# Patient Record
Sex: MALE | Race: WHITE | NOT HISPANIC OR LATINO | Employment: OTHER | URBAN - METROPOLITAN AREA
[De-identification: names, ages, dates, MRNs, and addresses within clinical notes are randomized per-mention and may not be internally consistent; named-entity substitution may affect disease eponyms.]

---

## 2017-04-17 ENCOUNTER — OFFICE VISIT (OUTPATIENT)
Dept: INTERNAL MEDICINE | Age: 72
End: 2017-04-17

## 2017-04-17 VITALS
OXYGEN SATURATION: 96 % | DIASTOLIC BLOOD PRESSURE: 82 MMHG | TEMPERATURE: 98 F | SYSTOLIC BLOOD PRESSURE: 144 MMHG | BODY MASS INDEX: 28.28 KG/M2 | WEIGHT: 176 LBS | HEART RATE: 82 BPM | HEIGHT: 66 IN

## 2017-04-17 DIAGNOSIS — I10 ESSENTIAL HYPERTENSION: Primary | Chronic | ICD-10-CM

## 2017-04-17 DIAGNOSIS — E78.2 MIXED HYPERLIPIDEMIA: Chronic | ICD-10-CM

## 2017-04-17 DIAGNOSIS — E66.3 OVERWEIGHT (BMI 25.0-29.9): Chronic | ICD-10-CM

## 2017-04-17 PROCEDURE — 99204 OFFICE O/P NEW MOD 45 MIN: CPT | Performed by: INTERNAL MEDICINE

## 2017-04-17 RX ORDER — MELATONIN
1000 DAILY
COMMUNITY
End: 2017-09-25

## 2017-04-17 RX ORDER — NAPROXEN 500 MG/1
500 TABLET ORAL 2 TIMES DAILY WITH MEALS
COMMUNITY
Start: 2017-03-21 | End: 2017-09-25 | Stop reason: SINTOL

## 2017-04-17 RX ORDER — SIMVASTATIN 20 MG
20 TABLET ORAL NIGHTLY
COMMUNITY
End: 2017-04-17 | Stop reason: SDUPTHER

## 2017-04-17 RX ORDER — BENAZEPRIL HYDROCHLORIDE 20 MG/1
TABLET ORAL
COMMUNITY
Start: 2017-03-30 | End: 2017-04-17 | Stop reason: SDUPTHER

## 2017-04-17 RX ORDER — BENAZEPRIL HYDROCHLORIDE 20 MG/1
20 TABLET ORAL DAILY
COMMUNITY
Start: 2017-04-17 | End: 2017-06-20 | Stop reason: SDUPTHER

## 2017-04-17 RX ORDER — SODIUM PHOSPHATE,MONO-DIBASIC 19G-7G/118
2 ENEMA (ML) RECTAL DAILY
COMMUNITY
End: 2018-03-26

## 2017-04-17 RX ORDER — SIMVASTATIN 20 MG
20 TABLET ORAL NIGHTLY
COMMUNITY
Start: 2017-04-17 | End: 2017-11-07 | Stop reason: SDUPTHER

## 2017-04-18 NOTE — PROGRESS NOTES
"Nicolás DONALDSON Wayne / 71 y.o. / male  04/17/2017    VITALS:    /82  Pulse 82  Temp 98 °F (36.7 °C)  Ht 66\" (167.6 cm)  Wt 176 lb (79.8 kg)  SpO2 96%  BMI 28.41 kg/m2  BP Readings from Last 3 Encounters:   04/17/17 144/82     Wt Readings from Last 3 Encounters:   04/17/17 176 lb (79.8 kg)      Body mass index is 28.41 kg/(m^2).    CC: Main reason(s) for today's visit: Establish Care (Former Dr Justen anderson)      HPI:    Patient is a 71 y.o. male who is here to establish care.   H/o chronic essential hypertension, does not check bp at home. On benazepril 20 mg w/ significant side effects. Denies h/o heart dz, kidney problems, h/o stroke/TIA.    H/o chronic hyperlipidemia on simvastatin 20 mg but taking only 2 days of the week. Denies myalgia or other problems with the medication.     >30 pack years of smoking quit in 1997. Mother had lung cancer and father had heart attack history.    H/o mild obesity but currently overweight. Denies h/o hyperglycemia or prediabetes.    ____________________________________________________________________    ASSESSMENT & PLAN:    Problem List Items Addressed This Visit        High    Hypertension - Primary (Chronic)    Current Assessment & Plan     BP little high today. Monitor home BP. Continue benazepril 20 mg qd for now. Low Na diet.          Relevant Medications    benazepril (LOTENSIN) 20 MG tablet    Mixed hyperlipidemia (Chronic)    Current Assessment & Plan     Continue simvastatin but instructed to take daily. Will check labs on follow-up.   Maintain low fat/cholesterol diet.           Relevant Medications    simvastatin (ZOCOR) 20 MG tablet       Low    Overweight (BMI 25.0-29.9) (Chronic)    Current Assessment & Plan     Maintain a low sugar/starch/carbohydrate diet and exercise regularly. Weight loss advised.                  Summary/Discussion:     ·        Return in about 6 months (around 10/17/2017) for F/U chronic medical problems.    Future " Appointments  Date Time Provider Department Center   10/17/2017 11:20 AM Eusebio Seaman MD [facility] None       ____________________________________________________________________    REVIEW OF SYSTEMS    Review of Systems   Constitutional: Negative.    HENT: Negative.    Eyes: Negative.    Respiratory: Negative.  Apnea: snores.    Cardiovascular: Negative.    Gastrointestinal: Negative.    Endocrine: Negative.    Genitourinary: Negative.  Negative for difficulty urinating, frequency and hematuria.        H/o elevated psa   Musculoskeletal: Negative.    Skin: Negative.    Allergic/Immunologic: Negative.    Neurological: Negative.    Hematological: Negative.    Psychiatric/Behavioral: Negative.      Other: As noted per HPI      PHYSICAL EXAMINATION    Physical Exam   Constitutional: He appears well-nourished. No distress.   Overweight     HENT:   Head: Normocephalic.   Right Ear: External ear normal.   Left Ear: External ear normal.   Mouth/Throat: Oropharynx is clear and moist.   Eyes: Conjunctivae are normal. Pupils are equal, round, and reactive to light. No scleral icterus.   Neck: Neck supple. No tracheal deviation present. No thyromegaly present.   Cardiovascular: Normal rate, regular rhythm, normal heart sounds and intact distal pulses.  Exam reveals no gallop and no friction rub.    No murmur heard.  Pulmonary/Chest: Effort normal and breath sounds normal.   Abdominal: Soft. Bowel sounds are normal. He exhibits no distension and no mass. There is no tenderness. No hernia.   Musculoskeletal: He exhibits no edema or deformity.   Lymphadenopathy:     He has no cervical adenopathy.        Right: No supraclavicular adenopathy present.        Left: No supraclavicular adenopathy present.   Neurological: He is alert. He has normal reflexes.   Skin: Skin is warm and intact. No rash noted. No cyanosis or erythema. No pallor. Nails show no clubbing.   Psychiatric: He has a normal mood and affect. His behavior is normal.  Judgment and thought content normal. Cognition and memory are normal.       REVIEWED DATA:    Labs:   No results found for: NA, K, AST, ALT, BUN, CREATININE, EGFRIFNONA, EGFRIFAFRI    No results found for: GLU, HGBA1C, MICROALBUR    No results found for: LDL, HDL, TRIG, CHOLHDLRATIO    No results found for: TSH, FREET4     No results found for: WBC, HGB, PLT      Imaging:        Medical Tests:        Summary of old records / correspondence / consultant report:        Request outside records:        ______________________________________________________________________    ALLERGIES:    Review of patient's allergies indicates no known allergies.    MEDICATIONS:       Current Outpatient Prescriptions   Medication Sig Dispense Refill   • benazepril (LOTENSIN) 20 MG tablet Take 1 tablet by mouth Daily.     • cholecalciferol (VITAMIN D3) 1000 UNITS tablet Take 1,000 Units by mouth Daily.     • glucosamine-chondroitin 500-400 MG capsule capsule Take  by mouth 2 (Two) Times a Day With Meals.     • Multiple Vitamins-Minerals (MULTIVITAMIN ADULT PO) Take  by mouth.     • naproxen (NAPROSYN) 500 MG tablet      • Selenium 200 MCG capsule Take  by mouth Daily.     • simvastatin (ZOCOR) 20 MG tablet Take 1 tablet by mouth Every Night.       No current facility-administered medications for this visit.        PFSH: The following portions of the patient's history were reviewed and updated as appropriate: Allergies / Current Medications / Past Medical History / Surgical History / Social History / Family History    PROBLEM LIST:    Patient Active Problem List   Diagnosis   • Hypertension   • Mixed hyperlipidemia   • Overweight (BMI 25.0-29.9)       PAST MEDICAL HX:    Past Medical History:   Diagnosis Date   • Broken legs    • Elevated PSA    • Hypertension        PAST SURGICAL HX:    Past Surgical History:   Procedure Laterality Date   • CRANIOTOMY      age of 18y ; blood clot    • KNEE ARTHROPLASTY     • TONSILLECTOMY     •  VASECTOMY         SOCIAL HX:    Social History     Social History   • Marital status:      Spouse name: Surya*   • Number of children: 2   • Years of education: N/A     Occupational History   • 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)      Social History Main Topics   • Smoking status: Former Smoker     Years: .     Types: Cigarettes     Quit date:    • Smokeless tobacco: Never Used      Comment: quit 20 years ago   • Alcohol use Yes      Comment: 1 drink per week   • Drug use: None   • Sexual activity: Yes     Partners: Female     Other Topics Concern   • None     Social History Narrative   • None       FAMILY HX:    Family History   Problem Relation Age of Onset   • Lung cancer Mother      smoker;  age 64   • Heart attack Father 64   • Urolithiasis Other      all siblings         **Deborah Disclaimer:   Much of this encounter note is an electronic transcription/translation of spoken language to printed text. The electronic translation of spoken language may permit erroneous, or at times, nonsensical words or phrases to be inadvertently transcribed. Although I have reviewed the note for such errors, some may still exist.

## 2017-04-18 NOTE — ASSESSMENT & PLAN NOTE
Continue simvastatin but instructed to take daily. Will check labs on follow-up.   Maintain low fat/cholesterol diet.

## 2017-05-17 ENCOUNTER — TELEPHONE (OUTPATIENT)
Dept: INTERNAL MEDICINE | Age: 72
End: 2017-05-17

## 2017-05-18 ENCOUNTER — TELEPHONE (OUTPATIENT)
Dept: INTERNAL MEDICINE | Age: 72
End: 2017-05-18

## 2017-05-18 DIAGNOSIS — I10 ESSENTIAL HYPERTENSION: Primary | Chronic | ICD-10-CM

## 2017-05-18 RX ORDER — HYDROCHLOROTHIAZIDE 12.5 MG/1
12.5 TABLET ORAL DAILY
Qty: 30 TABLET | Refills: 1 | Status: SHIPPED | OUTPATIENT
Start: 2017-05-18 | End: 2017-07-19 | Stop reason: SDUPTHER

## 2017-06-19 ENCOUNTER — OFFICE VISIT (OUTPATIENT)
Dept: INTERNAL MEDICINE | Age: 72
End: 2017-06-19

## 2017-06-19 VITALS
HEART RATE: 67 BPM | HEIGHT: 66 IN | BODY MASS INDEX: 28.28 KG/M2 | TEMPERATURE: 97.8 F | WEIGHT: 176 LBS | SYSTOLIC BLOOD PRESSURE: 122 MMHG | DIASTOLIC BLOOD PRESSURE: 70 MMHG | OXYGEN SATURATION: 97 %

## 2017-06-19 DIAGNOSIS — M15.9 PRIMARY OSTEOARTHRITIS INVOLVING MULTIPLE JOINTS: Chronic | ICD-10-CM

## 2017-06-19 DIAGNOSIS — I10 ESSENTIAL HYPERTENSION: Primary | Chronic | ICD-10-CM

## 2017-06-19 PROBLEM — M15.0 PRIMARY OSTEOARTHRITIS INVOLVING MULTIPLE JOINTS: Chronic | Status: ACTIVE | Noted: 2017-06-19

## 2017-06-19 PROCEDURE — 99213 OFFICE O/P EST LOW 20 MIN: CPT | Performed by: INTERNAL MEDICINE

## 2017-06-19 RX ORDER — AMLODIPINE BESYLATE 5 MG/1
5 TABLET ORAL DAILY
Qty: 30 TABLET | Refills: 3 | Status: SHIPPED | OUTPATIENT
Start: 2017-06-19 | End: 2017-11-17 | Stop reason: SDUPTHER

## 2017-06-19 NOTE — ASSESSMENT & PLAN NOTE
Add amlodipine 5 mg qd. Continue benazepril 20 mg and hctz 12.5 mg.   (on high dose naproxen for OA from ortho).

## 2017-06-19 NOTE — PROGRESS NOTES
"Nicolás DONALDSON Wayne / 71 y.o. / male  06/19/2017    ASSESSMENT & PLAN:    Problem List Items Addressed This Visit     Hypertension - Primary (Chronic)    Current Assessment & Plan     Add amlodipine 5 mg qd. Continue benazepril 20 mg and hctz 12.5 mg.   (on high dose naproxen for OA from ortho).         Relevant Medications    benazepril (LOTENSIN) 20 MG tablet    hydrochlorothiazide (HYDRODIURIL) 12.5 MG tablet    amLODIPine (NORVASC) 5 MG tablet    Primary osteoarthritis involving multiple joints (Chronic)    Current Assessment & Plan     D/w ortho re: trying tylenol in place of naproxen if possible (for BP)             No orders of the defined types were placed in this encounter.      Summary/Discussion:     ·       Return in about 3 months (around 9/19/2017) for Hypertension.    Future Appointments  Date Time Provider Department Center   10/17/2017 11:20 AM Eusebio Seaman MD [facility] None       ____________________________________________________________________    VITALS    /70  Pulse 67  Temp 97.8 °F (36.6 °C)  Ht 66\" (167.6 cm)  Wt 176 lb (79.8 kg)  SpO2 97%  BMI 28.41 kg/m2  BP Readings from Last 3 Encounters:   06/19/17 122/70   04/17/17 144/82     Wt Readings from Last 3 Encounters:   06/19/17 176 lb (79.8 kg)   04/17/17 176 lb (79.8 kg)      Body mass index is 28.41 kg/(m^2).    CC:  Main reason(s) for today's visit: Hypertension      HPI:     Hypertension: BP remains high in AM's. On benazepril 20 mg and hctz 12.5 mg qd. Denies ha or chest pain.  On naproxen 500 bid by ortho for OA of knees/hips.     Patient Care Team:  Eusebio Seaman MD as PCP - General (Internal Medicine)  ____________________________________________________________________    REVIEW OF SYSTEMS    Review of Systems  As noted per HPI  Constitutional neg   Resp neg   CV neg    Other: As noted per HPI      PHYSICAL EXAMINATION    Physical Exam  Constitutional  No distress   Psychiatric  Alert. Judgment and thought content normal. " Mood normal      REVIEWED DATA:    Labs:   No results found for: NA, K, AST, ALT, BUN, CREATININE, EGFRIFNONA, EGFRIFAFRI    No results found for: GLU, HGBA1C, MICROALBUR    No results found for: LDL, HDL, TRIG, CHOLHDLRATIO    No results found for: TSH, FREET4     No results found for: WBC, HGB, PLT     Imaging:        Medical Tests:        Summary of old records / correspondence / consultant report:        Request outside records:        No Known Allergies     Current Outpatient Prescriptions   Medication Sig Dispense Refill   • benazepril (LOTENSIN) 20 MG tablet Take 1 tablet by mouth Daily.     • cholecalciferol (VITAMIN D3) 1000 UNITS tablet Take 1,000 Units by mouth Daily.     • glucosamine-chondroitin 500-400 MG capsule capsule Take  by mouth 2 (Two) Times a Day With Meals.     • hydrochlorothiazide (HYDRODIURIL) 12.5 MG tablet Take 1 tablet by mouth Daily. 30 tablet 1   • Multiple Vitamins-Minerals (MULTIVITAMIN ADULT PO) Take  by mouth.     • naproxen (NAPROSYN) 500 MG tablet Take 500 mg by mouth 2 (Two) Times a Day With Meals.     • Selenium 200 MCG capsule Take  by mouth Daily.     • simvastatin (ZOCOR) 20 MG tablet Take 1 tablet by mouth Every Night.       No current facility-administered medications for this visit.        PFSH:     The following portions of the patient's history were reviewed and updated as appropriate: Allergies / Current Medications / Past Medical History / Surgical History / Social History / Family History    Patient Active Problem List   Diagnosis   • Hypertension   • Mixed hyperlipidemia   • Overweight (BMI 25.0-29.9)   • Primary osteoarthritis involving multiple joints       Past Medical History:   Diagnosis Date   • Broken legs    • Elevated PSA    • Hyperlipidemia    • Hypertension        Past Surgical History:   Procedure Laterality Date   • CRANIOTOMY      age of 18y ; blood clot    • KNEE ARTHROPLASTY     • TONSILLECTOMY     • VASECTOMY         Social History     Social  History   • Marital status:      Spouse name: Surya*   • Number of children: 2   • Years of education: N/A     Occupational History   • 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)      Social History Main Topics   • Smoking status: Former Smoker     Years:      Types: Cigarettes     Quit date:    • Smokeless tobacco: Never Used      Comment: quit 20 years ago   • Alcohol use Yes      Comment: 1 drink per week   • Drug use: None   • Sexual activity: Yes     Partners: Female     Other Topics Concern   • None     Social History Narrative   • None       Family History   Problem Relation Age of Onset   • Lung cancer Mother      smoker;  age 64   • Heart attack Father 64   • Urolithiasis Other      all siblings         **Deborah Disclaimer:   Much of this encounter note is an electronic transcription/translation of spoken language to printed text. The electronic translation of spoken language may permit erroneous, or at times, nonsensical words or phrases to be inadvertently transcribed. Although I have reviewed the note for such errors, some may still exist.

## 2017-06-20 DIAGNOSIS — I10 ESSENTIAL HYPERTENSION: Chronic | ICD-10-CM

## 2017-06-20 RX ORDER — BENAZEPRIL HYDROCHLORIDE 20 MG/1
20 TABLET ORAL DAILY
Qty: 90 TABLET | Refills: 1 | Status: SHIPPED | OUTPATIENT
Start: 2017-06-20 | End: 2018-04-05 | Stop reason: SDUPTHER

## 2017-07-19 DIAGNOSIS — I10 ESSENTIAL HYPERTENSION: Chronic | ICD-10-CM

## 2017-07-19 RX ORDER — HYDROCHLOROTHIAZIDE 12.5 MG/1
TABLET ORAL
Qty: 30 TABLET | Refills: 3 | Status: SHIPPED | OUTPATIENT
Start: 2017-07-19 | End: 2017-09-25 | Stop reason: SDUPTHER

## 2017-09-25 ENCOUNTER — HOSPITAL ENCOUNTER (INPATIENT)
Facility: HOSPITAL | Age: 72
LOS: 2 days | Discharge: HOME | End: 2017-09-27
Attending: EMERGENCY MEDICINE | Admitting: HOSPITALIST

## 2017-09-25 ENCOUNTER — TELEPHONE (OUTPATIENT)
Dept: INTERNAL MEDICINE | Age: 72
End: 2017-09-25

## 2017-09-25 DIAGNOSIS — Z87.19 H/O: GI BLEED: ICD-10-CM

## 2017-09-25 DIAGNOSIS — T39.394S: ICD-10-CM

## 2017-09-25 DIAGNOSIS — K92.2 ACUTE UPPER GI BLEED: Primary | ICD-10-CM

## 2017-09-25 PROBLEM — T39.395A NSAID INDUCED GASTRITIS: Status: ACTIVE | Noted: 2017-09-25

## 2017-09-25 PROBLEM — K29.60 NSAID INDUCED GASTRITIS: Status: ACTIVE | Noted: 2017-09-25

## 2017-09-25 LAB
ABO GROUP BLD: NORMAL
ALBUMIN SERPL-MCNC: 4.4 G/DL (ref 3.5–5.2)
ALBUMIN/GLOB SERPL: 1.4 G/DL
ALP SERPL-CCNC: 78 U/L (ref 39–117)
ALT SERPL W P-5'-P-CCNC: 8 U/L (ref 1–41)
ANION GAP SERPL CALCULATED.3IONS-SCNC: 12 MMOL/L
AST SERPL-CCNC: 10 U/L (ref 1–40)
BASOPHILS # BLD AUTO: 0.05 10*3/MM3 (ref 0–0.2)
BASOPHILS NFR BLD AUTO: 0.4 % (ref 0–1.5)
BILIRUB SERPL-MCNC: 0.3 MG/DL (ref 0.1–1.2)
BLD GP AB SCN SERPL QL: NEGATIVE
BUN BLD-MCNC: 28 MG/DL (ref 8–23)
BUN/CREAT SERPL: 26.7 (ref 7–25)
CALCIUM SPEC-SCNC: 10 MG/DL (ref 8.6–10.5)
CHLORIDE SERPL-SCNC: 104 MMOL/L (ref 98–107)
CO2 SERPL-SCNC: 27 MMOL/L (ref 22–29)
CREAT BLD-MCNC: 1.05 MG/DL (ref 0.76–1.27)
D-LACTATE SERPL-SCNC: 1.3 MMOL/L (ref 0.5–2)
DEPRECATED RDW RBC AUTO: 44.9 FL (ref 37–54)
EOSINOPHIL # BLD AUTO: 0.36 10*3/MM3 (ref 0–0.7)
EOSINOPHIL NFR BLD AUTO: 3.2 % (ref 0.3–6.2)
ERYTHROCYTE [DISTWIDTH] IN BLOOD BY AUTOMATED COUNT: 13.6 % (ref 11.5–14.5)
GFR SERPL CREATININE-BSD FRML MDRD: 70 ML/MIN/1.73
GLOBULIN UR ELPH-MCNC: 3.1 GM/DL
GLUCOSE BLD-MCNC: 103 MG/DL (ref 65–99)
HCT VFR BLD AUTO: 39 % (ref 40.4–52.2)
HGB BLD-MCNC: 13.3 G/DL (ref 13.7–17.6)
HOLD SPECIMEN: NORMAL
HOLD SPECIMEN: NORMAL
IMM GRANULOCYTES # BLD: 0.04 10*3/MM3 (ref 0–0.03)
IMM GRANULOCYTES NFR BLD: 0.4 % (ref 0–0.5)
LYMPHOCYTES # BLD AUTO: 1.49 10*3/MM3 (ref 0.9–4.8)
LYMPHOCYTES NFR BLD AUTO: 13.2 % (ref 19.6–45.3)
MCH RBC QN AUTO: 31.1 PG (ref 27–32.7)
MCHC RBC AUTO-ENTMCNC: 34.1 G/DL (ref 32.6–36.4)
MCV RBC AUTO: 91.3 FL (ref 79.8–96.2)
MONOCYTES # BLD AUTO: 0.9 10*3/MM3 (ref 0.2–1.2)
MONOCYTES NFR BLD AUTO: 8 % (ref 5–12)
NEUTROPHILS # BLD AUTO: 8.48 10*3/MM3 (ref 1.9–8.1)
NEUTROPHILS NFR BLD AUTO: 74.8 % (ref 42.7–76)
PLATELET # BLD AUTO: 266 10*3/MM3 (ref 140–500)
PMV BLD AUTO: 10 FL (ref 6–12)
POTASSIUM BLD-SCNC: 4.4 MMOL/L (ref 3.5–5.2)
PROT SERPL-MCNC: 7.5 G/DL (ref 6–8.5)
RBC # BLD AUTO: 4.27 10*6/MM3 (ref 4.6–6)
RH BLD: POSITIVE
SODIUM BLD-SCNC: 143 MMOL/L (ref 136–145)
WBC NRBC COR # BLD: 11.32 10*3/MM3 (ref 4.5–10.7)
WHOLE BLOOD HOLD SPECIMEN: NORMAL
WHOLE BLOOD HOLD SPECIMEN: NORMAL

## 2017-09-25 PROCEDURE — 86850 RBC ANTIBODY SCREEN: CPT | Performed by: EMERGENCY MEDICINE

## 2017-09-25 PROCEDURE — 99284 EMERGENCY DEPT VISIT MOD MDM: CPT

## 2017-09-25 PROCEDURE — 86901 BLOOD TYPING SEROLOGIC RH(D): CPT | Performed by: EMERGENCY MEDICINE

## 2017-09-25 PROCEDURE — 83605 ASSAY OF LACTIC ACID: CPT | Performed by: EMERGENCY MEDICINE

## 2017-09-25 PROCEDURE — 85018 HEMOGLOBIN: CPT | Performed by: HOSPITALIST

## 2017-09-25 PROCEDURE — 85014 HEMATOCRIT: CPT | Performed by: HOSPITALIST

## 2017-09-25 PROCEDURE — 36415 COLL VENOUS BLD VENIPUNCTURE: CPT | Performed by: EMERGENCY MEDICINE

## 2017-09-25 PROCEDURE — 80053 COMPREHEN METABOLIC PANEL: CPT | Performed by: EMERGENCY MEDICINE

## 2017-09-25 PROCEDURE — 85025 COMPLETE CBC W/AUTO DIFF WBC: CPT | Performed by: EMERGENCY MEDICINE

## 2017-09-25 PROCEDURE — 86900 BLOOD TYPING SEROLOGIC ABO: CPT | Performed by: EMERGENCY MEDICINE

## 2017-09-25 RX ORDER — SODIUM CHLORIDE 0.9 % (FLUSH) 0.9 %
10 SYRINGE (ML) INJECTION AS NEEDED
Status: DISCONTINUED | OUTPATIENT
Start: 2017-09-25 | End: 2017-09-25

## 2017-09-25 RX ORDER — NITROGLYCERIN 0.4 MG/1
0.4 TABLET SUBLINGUAL
Status: DISCONTINUED | OUTPATIENT
Start: 2017-09-25 | End: 2017-09-27 | Stop reason: HOSPADM

## 2017-09-25 RX ORDER — HYDROCHLOROTHIAZIDE 12.5 MG/1
12.5 TABLET ORAL DAILY
COMMUNITY
End: 2018-02-08 | Stop reason: SDUPTHER

## 2017-09-25 RX ORDER — PANTOPRAZOLE SODIUM 40 MG/10ML
INJECTION, POWDER, LYOPHILIZED, FOR SOLUTION INTRAVENOUS
Status: DISPENSED
Start: 2017-09-25 | End: 2017-09-26

## 2017-09-25 RX ORDER — PANTOPRAZOLE SODIUM 40 MG/10ML
80 INJECTION, POWDER, LYOPHILIZED, FOR SOLUTION INTRAVENOUS ONCE
Status: COMPLETED | OUTPATIENT
Start: 2017-09-25 | End: 2017-09-25

## 2017-09-25 RX ORDER — SODIUM CHLORIDE 9 MG/ML
125 INJECTION, SOLUTION INTRAVENOUS CONTINUOUS
Status: DISCONTINUED | OUTPATIENT
Start: 2017-09-25 | End: 2017-09-27 | Stop reason: HOSPADM

## 2017-09-25 RX ORDER — DEXTROSE, SODIUM CHLORIDE, AND POTASSIUM CHLORIDE 5; .9; .15 G/100ML; G/100ML; G/100ML
125 INJECTION INTRAVENOUS CONTINUOUS
Status: DISCONTINUED | OUTPATIENT
Start: 2017-09-25 | End: 2017-09-25

## 2017-09-25 RX ORDER — ACETAMINOPHEN 325 MG/1
650 TABLET ORAL EVERY 6 HOURS PRN
Status: DISCONTINUED | OUTPATIENT
Start: 2017-09-25 | End: 2017-09-27 | Stop reason: HOSPADM

## 2017-09-25 RX ORDER — BENAZEPRIL HYDROCHLORIDE 20 MG/1
20 TABLET ORAL DAILY
Status: DISCONTINUED | OUTPATIENT
Start: 2017-09-26 | End: 2017-09-26 | Stop reason: CLARIF

## 2017-09-25 RX ORDER — VITAMIN E 268 MG
400 CAPSULE ORAL DAILY
COMMUNITY
End: 2018-06-08

## 2017-09-25 RX ORDER — AMLODIPINE BESYLATE 5 MG/1
5 TABLET ORAL DAILY
Status: DISCONTINUED | OUTPATIENT
Start: 2017-09-26 | End: 2017-09-27 | Stop reason: HOSPADM

## 2017-09-25 RX ORDER — ONDANSETRON 2 MG/ML
4 INJECTION INTRAMUSCULAR; INTRAVENOUS EVERY 6 HOURS PRN
Status: DISCONTINUED | OUTPATIENT
Start: 2017-09-25 | End: 2017-09-27 | Stop reason: HOSPADM

## 2017-09-25 RX ADMIN — ACETAMINOPHEN 650 MG: 325 TABLET ORAL at 21:37

## 2017-09-25 RX ADMIN — PANTOPRAZOLE SODIUM 8 MG/HR: 40 INJECTION, POWDER, FOR SOLUTION INTRAVENOUS at 17:14

## 2017-09-25 RX ADMIN — PANTOPRAZOLE SODIUM 80 MG: 40 INJECTION, POWDER, FOR SOLUTION INTRAVENOUS at 17:14

## 2017-09-25 RX ADMIN — SODIUM CHLORIDE 125 ML/HR: 9 INJECTION, SOLUTION INTRAVENOUS at 21:26

## 2017-09-25 RX ADMIN — POTASSIUM CHLORIDE, DEXTROSE MONOHYDRATE AND SODIUM CHLORIDE 125 ML/HR: 150; 5; 900 INJECTION, SOLUTION INTRAVENOUS at 17:53

## 2017-09-25 NOTE — TELEPHONE ENCOUNTER
Pt is complaining of having a lot of blood in his stool starting on Saturday 9/23/17 & p/pt was taking naproxen 500mg, taking 2 pills twice a day; however when pt noticed blood in stool-he immediately stopped taking the naproxen.    As of today-Monday 9/25/17 p/pt states there is a little bit of blood in his stool but the stool is very black in color.    Pls call pt #[phone] ASA.

## 2017-09-26 ENCOUNTER — ANESTHESIA (OUTPATIENT)
Dept: GASTROENTEROLOGY | Facility: HOSPITAL | Age: 72
End: 2017-09-26

## 2017-09-26 ENCOUNTER — ANESTHESIA EVENT (OUTPATIENT)
Dept: GASTROENTEROLOGY | Facility: HOSPITAL | Age: 72
End: 2017-09-26

## 2017-09-26 PROBLEM — K25.9 GASTRIC ULCER WITHOUT HEMORRHAGE OR PERFORATION: Status: ACTIVE | Noted: 2017-09-26

## 2017-09-26 PROBLEM — K92.1 HEMATOCHEZIA: Status: ACTIVE | Noted: 2017-09-26

## 2017-09-26 PROBLEM — K92.2 GI HEMORRHAGE: Status: ACTIVE | Noted: 2017-09-26

## 2017-09-26 LAB
ANION GAP SERPL CALCULATED.3IONS-SCNC: 10.3 MMOL/L
BUN BLD-MCNC: 23 MG/DL (ref 8–23)
BUN/CREAT SERPL: 25.8 (ref 7–25)
CALCIUM SPEC-SCNC: 8.6 MG/DL (ref 8.6–10.5)
CHLORIDE SERPL-SCNC: 109 MMOL/L (ref 98–107)
CO2 SERPL-SCNC: 22.7 MMOL/L (ref 22–29)
CREAT BLD-MCNC: 0.89 MG/DL (ref 0.76–1.27)
DEPRECATED RDW RBC AUTO: 43.6 FL (ref 37–54)
DEPRECATED RDW RBC AUTO: 44.2 FL (ref 37–54)
ERYTHROCYTE [DISTWIDTH] IN BLOOD BY AUTOMATED COUNT: 13.3 % (ref 11.5–14.5)
ERYTHROCYTE [DISTWIDTH] IN BLOOD BY AUTOMATED COUNT: 13.4 % (ref 11.5–14.5)
GFR SERPL CREATININE-BSD FRML MDRD: 85 ML/MIN/1.73
GLUCOSE BLD-MCNC: 95 MG/DL (ref 65–99)
HCT VFR BLD AUTO: 31.6 % (ref 40.4–52.2)
HCT VFR BLD AUTO: 32.3 % (ref 40.4–52.2)
HCT VFR BLD AUTO: 32.3 % (ref 40.4–52.2)
HCT VFR BLD AUTO: 33.3 % (ref 40.4–52.2)
HGB BLD-MCNC: 10.7 G/DL (ref 13.7–17.6)
HGB BLD-MCNC: 10.9 G/DL (ref 13.7–17.6)
HGB BLD-MCNC: 10.9 G/DL (ref 13.7–17.6)
HGB BLD-MCNC: 11.3 G/DL (ref 13.7–17.6)
MCH RBC QN AUTO: 30.1 PG (ref 27–32.7)
MCH RBC QN AUTO: 30.4 PG (ref 27–32.7)
MCHC RBC AUTO-ENTMCNC: 33.7 G/DL (ref 32.6–36.4)
MCHC RBC AUTO-ENTMCNC: 33.9 G/DL (ref 32.6–36.4)
MCV RBC AUTO: 89 FL (ref 79.8–96.2)
MCV RBC AUTO: 90 FL (ref 79.8–96.2)
PLATELET # BLD AUTO: 209 10*3/MM3 (ref 140–500)
PLATELET # BLD AUTO: 211 10*3/MM3 (ref 140–500)
PMV BLD AUTO: 9.5 FL (ref 6–12)
PMV BLD AUTO: 9.8 FL (ref 6–12)
POTASSIUM BLD-SCNC: 4 MMOL/L (ref 3.5–5.2)
RBC # BLD AUTO: 3.55 10*6/MM3 (ref 4.6–6)
RBC # BLD AUTO: 3.59 10*6/MM3 (ref 4.6–6)
SODIUM BLD-SCNC: 142 MMOL/L (ref 136–145)
WBC NRBC COR # BLD: 8.17 10*3/MM3 (ref 4.5–10.7)
WBC NRBC COR # BLD: 9.66 10*3/MM3 (ref 4.5–10.7)

## 2017-09-26 PROCEDURE — 25010000002 ONDANSETRON PER 1 MG: Performed by: HOSPITALIST

## 2017-09-26 PROCEDURE — 0DB68ZX EXCISION OF STOMACH, VIA NATURAL OR ARTIFICIAL OPENING ENDOSCOPIC, DIAGNOSTIC: ICD-10-PCS | Performed by: INTERNAL MEDICINE

## 2017-09-26 PROCEDURE — 85027 COMPLETE CBC AUTOMATED: CPT | Performed by: HOSPITALIST

## 2017-09-26 PROCEDURE — 0DB58ZX EXCISION OF ESOPHAGUS, VIA NATURAL OR ARTIFICIAL OPENING ENDOSCOPIC, DIAGNOSTIC: ICD-10-PCS | Performed by: INTERNAL MEDICINE

## 2017-09-26 PROCEDURE — 99223 1ST HOSP IP/OBS HIGH 75: CPT | Performed by: INTERNAL MEDICINE

## 2017-09-26 PROCEDURE — 25010000002 PROPOFOL 10 MG/ML EMULSION: Performed by: ANESTHESIOLOGY

## 2017-09-26 PROCEDURE — 80048 BASIC METABOLIC PNL TOTAL CA: CPT | Performed by: HOSPITALIST

## 2017-09-26 PROCEDURE — 85027 COMPLETE CBC AUTOMATED: CPT | Performed by: INTERNAL MEDICINE

## 2017-09-26 PROCEDURE — 85018 HEMOGLOBIN: CPT | Performed by: HOSPITALIST

## 2017-09-26 PROCEDURE — 85014 HEMATOCRIT: CPT | Performed by: HOSPITALIST

## 2017-09-26 PROCEDURE — 88305 TISSUE EXAM BY PATHOLOGIST: CPT | Performed by: INTERNAL MEDICINE

## 2017-09-26 PROCEDURE — 43239 EGD BIOPSY SINGLE/MULTIPLE: CPT | Performed by: INTERNAL MEDICINE

## 2017-09-26 RX ORDER — LISINOPRIL 20 MG/1
20 TABLET ORAL
Status: DISCONTINUED | OUTPATIENT
Start: 2017-09-26 | End: 2017-09-27 | Stop reason: HOSPADM

## 2017-09-26 RX ORDER — SODIUM CHLORIDE, SODIUM LACTATE, POTASSIUM CHLORIDE, CALCIUM CHLORIDE 600; 310; 30; 20 MG/100ML; MG/100ML; MG/100ML; MG/100ML
INJECTION, SOLUTION INTRAVENOUS CONTINUOUS PRN
Status: DISCONTINUED | OUTPATIENT
Start: 2017-09-26 | End: 2017-09-26 | Stop reason: SURG

## 2017-09-26 RX ORDER — SODIUM CHLORIDE 9 MG/ML
1000 INJECTION, SOLUTION INTRAVENOUS CONTINUOUS
Status: DISCONTINUED | OUTPATIENT
Start: 2017-09-26 | End: 2017-09-27 | Stop reason: HOSPADM

## 2017-09-26 RX ORDER — UREA 10 %
3 LOTION (ML) TOPICAL NIGHTLY PRN
Status: DISCONTINUED | OUTPATIENT
Start: 2017-09-26 | End: 2017-09-26

## 2017-09-26 RX ORDER — UREA 10 %
1 LOTION (ML) TOPICAL NIGHTLY PRN
Status: DISCONTINUED | OUTPATIENT
Start: 2017-09-26 | End: 2017-09-27 | Stop reason: HOSPADM

## 2017-09-26 RX ORDER — PANTOPRAZOLE SODIUM 40 MG/1
40 TABLET, DELAYED RELEASE ORAL
Status: DISCONTINUED | OUTPATIENT
Start: 2017-09-27 | End: 2017-09-27 | Stop reason: HOSPADM

## 2017-09-26 RX ORDER — POLYETHYLENE GLYCOL 3350, SODIUM CHLORIDE, POTASSIUM CHLORIDE, SODIUM BICARBONATE, AND SODIUM SULFATE 240; 5.84; 2.98; 6.72; 22.72 G/4L; G/4L; G/4L; G/4L; G/4L
2000 POWDER, FOR SOLUTION ORAL 2 TIMES DAILY
Status: COMPLETED | OUTPATIENT
Start: 2017-09-26 | End: 2017-09-27

## 2017-09-26 RX ORDER — PROPOFOL 10 MG/ML
VIAL (ML) INTRAVENOUS AS NEEDED
Status: DISCONTINUED | OUTPATIENT
Start: 2017-09-26 | End: 2017-09-26 | Stop reason: SURG

## 2017-09-26 RX ORDER — LIDOCAINE HYDROCHLORIDE 10 MG/ML
0.5 INJECTION, SOLUTION INFILTRATION; PERINEURAL ONCE AS NEEDED
Status: DISCONTINUED | OUTPATIENT
Start: 2017-09-26 | End: 2017-09-26

## 2017-09-26 RX ORDER — LIDOCAINE HYDROCHLORIDE 20 MG/ML
INJECTION, SOLUTION INFILTRATION; PERINEURAL AS NEEDED
Status: DISCONTINUED | OUTPATIENT
Start: 2017-09-26 | End: 2017-09-26 | Stop reason: SURG

## 2017-09-26 RX ORDER — SODIUM CHLORIDE 0.9 % (FLUSH) 0.9 %
3 SYRINGE (ML) INJECTION AS NEEDED
Status: DISCONTINUED | OUTPATIENT
Start: 2017-09-26 | End: 2017-09-26

## 2017-09-26 RX ORDER — PROPOFOL 10 MG/ML
VIAL (ML) INTRAVENOUS CONTINUOUS PRN
Status: DISCONTINUED | OUTPATIENT
Start: 2017-09-26 | End: 2017-09-26 | Stop reason: SURG

## 2017-09-26 RX ORDER — POLYETHYLENE GLYCOL 3350, SODIUM CHLORIDE, POTASSIUM CHLORIDE, SODIUM BICARBONATE, AND SODIUM SULFATE 240; 5.84; 2.98; 6.72; 22.72 G/4L; G/4L; G/4L; G/4L; G/4L
2000 POWDER, FOR SOLUTION ORAL ONCE
Status: DISCONTINUED | OUTPATIENT
Start: 2017-09-26 | End: 2017-09-26 | Stop reason: SDUPTHER

## 2017-09-26 RX ADMIN — LISINOPRIL 20 MG: 20 TABLET ORAL at 12:32

## 2017-09-26 RX ADMIN — PROPOFOL 140 MCG/KG/MIN: 10 INJECTION, EMULSION INTRAVENOUS at 13:45

## 2017-09-26 RX ADMIN — SODIUM CHLORIDE 1000 ML: 9 INJECTION, SOLUTION INTRAVENOUS at 13:22

## 2017-09-26 RX ADMIN — LIDOCAINE HYDROCHLORIDE 50 MG: 20 INJECTION, SOLUTION INFILTRATION; PERINEURAL at 13:45

## 2017-09-26 RX ADMIN — POLYETHYLENE GLYCOL 3350, SODIUM CHLORIDE, POTASSIUM CHLORIDE, SODIUM BICARBONATE, AND SODIUM SULFATE 2000 ML: 240; 5.84; 2.98; 6.72; 22.72 POWDER, FOR SOLUTION ORAL at 20:01

## 2017-09-26 RX ADMIN — PROPOFOL 150 MG: 10 INJECTION, EMULSION INTRAVENOUS at 13:45

## 2017-09-26 RX ADMIN — SODIUM CHLORIDE, POTASSIUM CHLORIDE, SODIUM LACTATE AND CALCIUM CHLORIDE: 600; 310; 30; 20 INJECTION, SOLUTION INTRAVENOUS at 13:42

## 2017-09-26 RX ADMIN — SODIUM CHLORIDE 125 ML/HR: 9 INJECTION, SOLUTION INTRAVENOUS at 04:59

## 2017-09-26 RX ADMIN — ONDANSETRON 4 MG: 2 INJECTION INTRAMUSCULAR; INTRAVENOUS at 12:28

## 2017-09-26 RX ADMIN — PANTOPRAZOLE SODIUM 8 MG/HR: 40 INJECTION, POWDER, FOR SOLUTION INTRAVENOUS at 03:21

## 2017-09-26 RX ADMIN — AMLODIPINE BESYLATE 5 MG: 5 TABLET ORAL at 08:17

## 2017-09-26 RX ADMIN — ACETAMINOPHEN 650 MG: 325 TABLET ORAL at 06:36

## 2017-09-26 RX ADMIN — ACETAMINOPHEN 650 MG: 325 TABLET ORAL at 12:28

## 2017-09-26 RX ADMIN — ACETAMINOPHEN 650 MG: 325 TABLET ORAL at 23:57

## 2017-09-26 RX ADMIN — PANTOPRAZOLE SODIUM 8 MG/HR: 40 INJECTION, POWDER, FOR SOLUTION INTRAVENOUS at 08:13

## 2017-09-26 NOTE — ANESTHESIA PREPROCEDURE EVALUATION
Anesthesia Evaluation     Patient summary reviewed          Airway   Mallampati: III  TM distance: <3 FB  Neck ROM: limited  possible difficult intubation  Dental - normal exam     Pulmonary     breath sounds clear to auscultation  Cardiovascular   Exercise tolerance: good (4-7 METS)    Rhythm: regular  Rate: normal        Neuro/Psych  GI/Hepatic/Renal/Endo      Musculoskeletal     Abdominal    Substance History      OB/GYN          Other                                        Anesthesia Plan    ASA 3     MAC     intravenous induction   Anesthetic plan and risks discussed with patient.

## 2017-09-26 NOTE — ANESTHESIA POSTPROCEDURE EVALUATION
"Patient: Nicolás Black    Procedure Summary     Date Anesthesia Start Anesthesia Stop Room / Location    09/26/17 1342 1356  [facility] /  [facility]       Procedure Diagnosis Surgeon Provider    ESOPHAGOGASTRODUODENOSCOPY WITH BOPSIES (N/A Esophagus) NSAID induced gastritis, undetermined intent, sequela  (NSAID induced gastritis, undetermined intent, sequela [T39.394S]) MD Ortiz Madrigal MD          Anesthesia Type: MAC  Last vitals  BP   130/63 (09/26/17 1417)    Temp   36.8 °C (98.2 °F) (09/26/17 1417)    Pulse   78 (09/26/17 1417)   Resp   16 (09/26/17 1417)    SpO2   96 % (09/26/17 1417)      Post Anesthesia Care and Evaluation    Patient location during evaluation: PACU  Patient participation: complete - patient participated  Level of consciousness: awake  Pain score: 0  Pain management: adequate  Airway patency: patent  Anesthetic complications: No anesthetic complications    Cardiovascular status: acceptable  Respiratory status: acceptable  Hydration status: acceptable    Comments: Blood pressure 130/63, pulse 78, temperature 36.8 °C (98.2 °F), temperature source Oral, resp. rate 16, height 66\" (167.6 cm), weight 178 lb (80.7 kg), SpO2 96 %.    No anesthesia care post op    "

## 2017-09-27 ENCOUNTER — ANESTHESIA EVENT (OUTPATIENT)
Dept: GASTROENTEROLOGY | Facility: HOSPITAL | Age: 72
End: 2017-09-27

## 2017-09-27 ENCOUNTER — ANESTHESIA (OUTPATIENT)
Dept: GASTROENTEROLOGY | Facility: HOSPITAL | Age: 72
End: 2017-09-27

## 2017-09-27 VITALS
OXYGEN SATURATION: 100 % | WEIGHT: 178 LBS | RESPIRATION RATE: 18 BRPM | DIASTOLIC BLOOD PRESSURE: 72 MMHG | SYSTOLIC BLOOD PRESSURE: 155 MMHG | HEART RATE: 84 BPM | TEMPERATURE: 98.3 F | BODY MASS INDEX: 28.61 KG/M2 | HEIGHT: 66 IN

## 2017-09-27 PROBLEM — K25.3 ACUTE GASTRIC ULCER: Status: ACTIVE | Noted: 2017-09-27

## 2017-09-27 PROBLEM — Z87.19 H/O: GI BLEED: Status: ACTIVE | Noted: 2017-09-25

## 2017-09-27 LAB
ALBUMIN SERPL-MCNC: 3.6 G/DL (ref 3.5–5.2)
ALBUMIN/GLOB SERPL: 1.4 G/DL
ALP SERPL-CCNC: 64 U/L (ref 39–117)
ALT SERPL W P-5'-P-CCNC: 12 U/L (ref 1–41)
ANION GAP SERPL CALCULATED.3IONS-SCNC: 11.7 MMOL/L
AST SERPL-CCNC: 15 U/L (ref 1–40)
BILIRUB SERPL-MCNC: 0.4 MG/DL (ref 0.1–1.2)
BUN BLD-MCNC: 12 MG/DL (ref 8–23)
BUN/CREAT SERPL: 13.6 (ref 7–25)
CALCIUM SPEC-SCNC: 9.1 MG/DL (ref 8.6–10.5)
CHLORIDE SERPL-SCNC: 109 MMOL/L (ref 98–107)
CO2 SERPL-SCNC: 25.3 MMOL/L (ref 22–29)
CREAT BLD-MCNC: 0.88 MG/DL (ref 0.76–1.27)
CYTO UR: NORMAL
DEPRECATED RDW RBC AUTO: 43.6 FL (ref 37–54)
ERYTHROCYTE [DISTWIDTH] IN BLOOD BY AUTOMATED COUNT: 13.4 % (ref 11.5–14.5)
GFR SERPL CREATININE-BSD FRML MDRD: 86 ML/MIN/1.73
GLOBULIN UR ELPH-MCNC: 2.6 GM/DL
GLUCOSE BLD-MCNC: 95 MG/DL (ref 65–99)
HCT VFR BLD AUTO: 30.1 % (ref 40.4–52.2)
HGB BLD-MCNC: 10.5 G/DL (ref 13.7–17.6)
LAB AP CASE REPORT: NORMAL
Lab: NORMAL
MCH RBC QN AUTO: 31.2 PG (ref 27–32.7)
MCHC RBC AUTO-ENTMCNC: 34.9 G/DL (ref 32.6–36.4)
MCV RBC AUTO: 89.3 FL (ref 79.8–96.2)
PATH REPORT.FINAL DX SPEC: NORMAL
PATH REPORT.GROSS SPEC: NORMAL
PLATELET # BLD AUTO: 187 10*3/MM3 (ref 140–500)
PMV BLD AUTO: 9.7 FL (ref 6–12)
POTASSIUM BLD-SCNC: 3.7 MMOL/L (ref 3.5–5.2)
PROT SERPL-MCNC: 6.2 G/DL (ref 6–8.5)
RBC # BLD AUTO: 3.37 10*6/MM3 (ref 4.6–6)
SODIUM BLD-SCNC: 146 MMOL/L (ref 136–145)
WBC NRBC COR # BLD: 7.73 10*3/MM3 (ref 4.5–10.7)

## 2017-09-27 PROCEDURE — 80053 COMPREHEN METABOLIC PANEL: CPT | Performed by: INTERNAL MEDICINE

## 2017-09-27 PROCEDURE — 25010000002 PROPOFOL 10 MG/ML EMULSION: Performed by: ANESTHESIOLOGY

## 2017-09-27 PROCEDURE — 45378 DIAGNOSTIC COLONOSCOPY: CPT | Performed by: INTERNAL MEDICINE

## 2017-09-27 PROCEDURE — 85027 COMPLETE CBC AUTOMATED: CPT | Performed by: INTERNAL MEDICINE

## 2017-09-27 PROCEDURE — 25010000002 FENTANYL CITRATE (PF) 100 MCG/2ML SOLUTION: Performed by: ANESTHESIOLOGY

## 2017-09-27 PROCEDURE — 0DJD8ZZ INSPECTION OF LOWER INTESTINAL TRACT, VIA NATURAL OR ARTIFICIAL OPENING ENDOSCOPIC: ICD-10-PCS | Performed by: INTERNAL MEDICINE

## 2017-09-27 RX ORDER — ACETAMINOPHEN 325 MG/1
650 TABLET ORAL EVERY 6 HOURS PRN
Start: 2017-09-27 | End: 2017-10-02

## 2017-09-27 RX ORDER — PROPOFOL 10 MG/ML
VIAL (ML) INTRAVENOUS AS NEEDED
Status: DISCONTINUED | OUTPATIENT
Start: 2017-09-27 | End: 2017-09-27 | Stop reason: SURG

## 2017-09-27 RX ORDER — PANTOPRAZOLE SODIUM 40 MG/1
40 TABLET, DELAYED RELEASE ORAL DAILY
Qty: 90 TABLET | Refills: 0 | Status: SHIPPED | OUTPATIENT
Start: 2017-09-27 | End: 2018-02-08 | Stop reason: SDUPTHER

## 2017-09-27 RX ORDER — LIDOCAINE HYDROCHLORIDE 20 MG/ML
INJECTION, SOLUTION INFILTRATION; PERINEURAL AS NEEDED
Status: DISCONTINUED | OUTPATIENT
Start: 2017-09-27 | End: 2017-09-27 | Stop reason: SURG

## 2017-09-27 RX ORDER — PROPOFOL 10 MG/ML
VIAL (ML) INTRAVENOUS CONTINUOUS PRN
Status: DISCONTINUED | OUTPATIENT
Start: 2017-09-27 | End: 2017-09-27 | Stop reason: SURG

## 2017-09-27 RX ORDER — FENTANYL CITRATE 50 UG/ML
INJECTION, SOLUTION INTRAMUSCULAR; INTRAVENOUS AS NEEDED
Status: DISCONTINUED | OUTPATIENT
Start: 2017-09-27 | End: 2017-09-27 | Stop reason: SURG

## 2017-09-27 RX ORDER — SODIUM CHLORIDE, SODIUM LACTATE, POTASSIUM CHLORIDE, CALCIUM CHLORIDE 600; 310; 30; 20 MG/100ML; MG/100ML; MG/100ML; MG/100ML
30 INJECTION, SOLUTION INTRAVENOUS CONTINUOUS PRN
Status: DISCONTINUED | OUTPATIENT
Start: 2017-09-27 | End: 2017-09-27 | Stop reason: HOSPADM

## 2017-09-27 RX ADMIN — SODIUM CHLORIDE, POTASSIUM CHLORIDE, SODIUM LACTATE AND CALCIUM CHLORIDE: 600; 310; 30; 20 INJECTION, SOLUTION INTRAVENOUS at 13:20

## 2017-09-27 RX ADMIN — PROPOFOL 160 MCG/KG/MIN: 10 INJECTION, EMULSION INTRAVENOUS at 13:20

## 2017-09-27 RX ADMIN — SODIUM CHLORIDE 125 ML/HR: 9 INJECTION, SOLUTION INTRAVENOUS at 00:05

## 2017-09-27 RX ADMIN — POLYETHYLENE GLYCOL 3350, SODIUM CHLORIDE, POTASSIUM CHLORIDE, SODIUM BICARBONATE, AND SODIUM SULFATE 2000 ML: 240; 5.84; 2.98; 6.72; 22.72 POWDER, FOR SOLUTION ORAL at 05:00

## 2017-09-27 RX ADMIN — PROPOFOL 50 MG: 10 INJECTION, EMULSION INTRAVENOUS at 13:20

## 2017-09-27 RX ADMIN — SODIUM CHLORIDE, POTASSIUM CHLORIDE, SODIUM LACTATE AND CALCIUM CHLORIDE 30 ML/HR: 600; 310; 30; 20 INJECTION, SOLUTION INTRAVENOUS at 13:09

## 2017-09-27 RX ADMIN — FENTANYL CITRATE 50 MCG: 50 INJECTION INTRAMUSCULAR; INTRAVENOUS at 13:20

## 2017-09-27 RX ADMIN — Medication 1 MG: at 00:43

## 2017-09-27 RX ADMIN — LIDOCAINE HYDROCHLORIDE 50 MG: 20 INJECTION, SOLUTION INFILTRATION; PERINEURAL at 13:20

## 2017-09-27 NOTE — ANESTHESIA POSTPROCEDURE EVALUATION
"Patient: Nicolás Black    Procedure Summary     Date Anesthesia Start Anesthesia Stop Room / Location    09/27/17 1318 1343  [facility] /  [facility]       Procedure Diagnosis Surgeon Provider    COLONOSCOPY into cecum and TI (N/A ) Diverticulosis; Hemorrhoids  (H/O: GI bleed [Z87.19]) MD Tamara Combs MD          Anesthesia Type: MAC  Last vitals  BP   128/62 (09/27/17 1346)    Temp   36.7 °C (98 °F) (09/27/17 1341)    Pulse   82 (09/27/17 1346)   Resp   15 (09/27/17 1346)    SpO2   93 % (09/27/17 1346)      Post Anesthesia Care and Evaluation    Patient location during evaluation: PHASE II  Patient participation: complete - patient participated  Level of consciousness: awake  Pain management: adequate  Airway patency: patent  Anesthetic complications: No anesthetic complications    Cardiovascular status: acceptable  Respiratory status: acceptable  Hydration status: acceptable    Comments: /62  Pulse 82  Temp 36.7 °C (98 °F) (Oral)   Resp 15  Ht 66\" (167.6 cm)  Wt 178 lb (80.7 kg)  SpO2 93%  BMI 28.73 kg/m2      "

## 2017-09-27 NOTE — ANESTHESIA PREPROCEDURE EVALUATION
Anesthesia Evaluation     Patient summary reviewed and Nursing notes reviewed   NPO Solid Status: > 8 hours  NPO Liquid Status: > 4 hours     Airway   Mallampati: II  Dental - normal exam     Pulmonary - negative pulmonary ROS and normal exam   Cardiovascular - normal exam    (+) hypertension, hyperlipidemia      Neuro/Psych- negative ROS  GI/Hepatic/Renal/Endo - negative ROS     ROS Comment: hematochezia    Musculoskeletal     Abdominal    Substance History - negative use     OB/GYN          Other   (+) arthritis                                   Anesthesia Plan    ASA 3     MAC     intravenous induction   Anesthetic plan and risks discussed with patient.

## 2017-09-28 ENCOUNTER — PREP FOR SURGERY (OUTPATIENT)
Dept: OTHER | Facility: HOSPITAL | Age: 72
End: 2017-09-28

## 2017-09-28 DIAGNOSIS — K27.9 PUD (PEPTIC ULCER DISEASE): Primary | ICD-10-CM

## 2017-09-29 ENCOUNTER — TELEPHONE (OUTPATIENT)
Dept: GASTROENTEROLOGY | Facility: CLINIC | Age: 72
End: 2017-09-29

## 2017-09-29 NOTE — TELEPHONE ENCOUNTER
----- Message from Hunter Clayton MD sent at 9/28/2017  5:33 PM EDT -----  Mosqueda's no dysplasia.  EGD repeat 3 months.   continue PPI Case request is in

## 2017-10-02 ENCOUNTER — OFFICE VISIT (OUTPATIENT)
Dept: INTERNAL MEDICINE | Age: 72
End: 2017-10-02

## 2017-10-02 VITALS
OXYGEN SATURATION: 97 % | HEIGHT: 66 IN | DIASTOLIC BLOOD PRESSURE: 74 MMHG | BODY MASS INDEX: 29.12 KG/M2 | TEMPERATURE: 97.4 F | HEART RATE: 65 BPM | SYSTOLIC BLOOD PRESSURE: 112 MMHG | WEIGHT: 181.2 LBS

## 2017-10-02 DIAGNOSIS — M15.9 PRIMARY OSTEOARTHRITIS INVOLVING MULTIPLE JOINTS: Chronic | ICD-10-CM

## 2017-10-02 DIAGNOSIS — K25.4 GASTRIC ULCER WITH HEMORRHAGE, UNSPECIFIED CHRONICITY: Primary | ICD-10-CM

## 2017-10-02 DIAGNOSIS — Z23 INFLUENZA VACCINE NEEDED: ICD-10-CM

## 2017-10-02 PROCEDURE — 99214 OFFICE O/P EST MOD 30 MIN: CPT | Performed by: INTERNAL MEDICINE

## 2017-10-02 PROCEDURE — G0008 ADMIN INFLUENZA VIRUS VAC: HCPCS | Performed by: INTERNAL MEDICINE

## 2017-10-02 RX ORDER — SENNOSIDES 8.6 MG
650 CAPSULE ORAL EVERY 8 HOURS PRN
COMMUNITY
Start: 2017-10-02 | End: 2018-03-26

## 2017-10-02 NOTE — PROGRESS NOTES
"Nicolás DONALDSON Wayne / 72 y.o. / male  10/02/2017    VITALS    /74 (BP Location: Left arm, Patient Position: Sitting, Cuff Size: Adult)  Pulse 65  Temp 97.4 °F (36.3 °C) (Oral)   Ht 66\" (167.6 cm)  Wt 181 lb 3.2 oz (82.2 kg)  SpO2 97%  BMI 29.25 kg/m2  BP Readings from Last 3 Encounters:   10/02/17 112/74   09/27/17 155/72   06/19/17 122/70     Wt Readings from Last 3 Encounters:   10/02/17 181 lb 3.2 oz (82.2 kg)   09/26/17 178 lb (80.7 kg)   06/19/17 176 lb (79.8 kg)      Body mass index is 29.25 kg/(m^2).    CC:  Main reason(s) for today's visit: hemorrage (hospital follow up was in hospital 9/25 to 9/27 with a GI bleed)      HPI:     Date of admission/discharge: 9/25-9/27  Hospital: [facility]  Principle Dx: Gastric ulcer w/ recent GIBleed  Secondary Dx: OA knees/hips  Treatment: EGD/CLS performed: no active bleed. Started on PPI therapy. Naproxen stopped. No transfusion needed.   Course: ongoing OA pain of knees and hips. To have THR in October. Taking tylenol with mild relief.     Patient Care Team:  Eusebio Seaman MD as PCP - General (Internal Medicine)  ____________________________________________________________________    ASSESSMENT & PLAN:    1. Gastric ulcer with hemorrhage, unspecified chronicity    2. Primary osteoarthritis involving multiple joints      No orders of the defined types were placed in this encounter.      Summary/Discussion:     · Continue Protonix 40 mg daily and avoid all NSAIDs  · Recommended Tylenol arthritis 6 or 50 mg every 8 hours when necessary for pain  · Keep appointment for October 17 for pre-op evaluation for planned total hip replacement    Return for Next scheduled follow up (will see me for pre-op visit).    Future Appointments  Date Time Provider Department Center   10/17/2017 11:20 AM MD THAIS Lorenzo None     ____________________________________________________________________    REVIEW OF SYSTEMS    Review of Systems  Other: As noted per " HPI  No melena or blood in stool  No abd pain  No chest pain or sob      PHYSICAL EXAMINATION    Physical Exam  Constitutional  No distress  Cardiovascular Rate  normal . Rhythm: regular . Heart sounds:  normal  Pulmonary/Chest  Effort normal. Breath sounds:  Normal  Abdomen: Soft and nontender, no palpable mass, no hepatomegaly.   Psychiatric  Alert. Judgment and thought content normal. Mood normal     REVIEWED DATA:    Labs:   Lab Results   Component Value Date    HGB 10.5 (L) 09/27/2017    HGB 10.7 (L) 09/26/2017    HGB 10.9 (L) 09/26/2017    HGB 10.9 (L) 09/26/2017        Imaging:   No results found.     Medical Tests:   EGD:    - [place]-colored mucosa suspicious for long-segment Mosqueda's esophagus. Biopsied.  - Small hiatal hernia.  - Non-bleeding gastric ulcers with no stigmata of bleeding. Biopsied.  - Normal examined duodenum.    CLS:  - Non-thrombosed external hemorrhoids found on perianal exam.  - Diverticulosis in the entire examined colon.  - Stool in the rectum, in the sigmoid colon, in the transverse colon and in the cecum.  - The examined portion of the ileum was normal.  - Internal hemorrhoids.  - No specimens collected.    Summary of old records / correspondence / consultant report:   DC summary re: issues addressed on HPI    Request outside records:       ALLERGIES  No Known Allergies     MEDICATIONS  Current Outpatient Prescriptions on File Prior to Visit   Medication Sig Dispense Refill   • amLODIPine (NORVASC) 5 MG tablet Take 1 tablet by mouth Daily. 30 tablet 3   • benazepril (LOTENSIN) 20 MG tablet Take 1 tablet by mouth Daily. 90 tablet 1   • glucosamine-chondroitin 500-400 MG capsule capsule Take 2 capsules by mouth Daily.     • hydrochlorothiazide (HYDRODIURIL) 12.5 MG tablet Take 12.5 mg by mouth Daily.     • Multiple Vitamins-Minerals (MULTIVITAMIN ADULT PO) Take 1 tablet by mouth Daily.     • pantoprazole (PROTONIX) 40 MG EC tablet Take 1 tablet by mouth Daily. 90 tablet 0   • Selenium  200 MCG capsule Take 200 mcg by mouth Daily.     • simvastatin (ZOCOR) 20 MG tablet Take 1 tablet by mouth Every Night.     • vitamin E 400 UNIT capsule Take 400 Units by mouth Daily.     • [DISCONTINUED] acetaminophen (TYLENOL) 325 MG tablet Take 2 tablets by mouth Every 6 (Six) Hours As Needed for Mild Pain  (pain or symptomatic fever).       No current facility-administered medications on file prior to visit.        PFSH:     The following portions of the patient's history were reviewed and updated as appropriate: Allergies / Current Medications / Past Medical History / Surgical History / Social History / Family History    PROBLEM LIST   Patient Active Problem List   Diagnosis   • Hypertension   • Mixed hyperlipidemia   • Overweight (BMI 25.0-29.9)   • Primary osteoarthritis involving multiple joints   • Gastric ulcer with hemorrhage   • H/O: GI bleed       PAST MEDICAL HISTORY  Past Medical History:   Diagnosis Date   • Broken legs    • Elevated PSA    • Hyperlipidemia    • Hypertension        SURGICAL HISTORY  Past Surgical History:   Procedure Laterality Date   • COLONOSCOPY N/A 9/27/2017    Procedure: COLONOSCOPY into cecum and TI;  Surgeon: Braydon DHALIWAL MD;  [facility] [facility];  Service:    • CRANIOTOMY      age of 18y ; blood clot    • ENDOSCOPY N/A 9/26/2017    Procedure: ESOPHAGOGASTRODUODENOSCOPY WITH BOPSIES;  Surgeon: Hunter Clayton MD;  [facility];  Service:    • KNEE ARTHROPLASTY     • TONSILLECTOMY     • VASECTOMY         SOCIAL HISTORY  Social History     Social History   • Marital status:      Spouse name: Surya*   • Number of children: 2   • Years of education: N/A     Occupational History   • 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)      Social History Main Topics   • Smoking status: Former Smoker     Years: 30.00     Types: Cigarettes     Quit date: 1997   • Smokeless tobacco: Never Used      Comment: quit 20 years ago   • Alcohol use Yes      Comment: 1 drink per  week   • Drug use: No   • Sexual activity: Yes     Partners: Female     Other Topics Concern   • None     Social History Narrative       FAMILY HISTORY  Family History   Problem Relation Age of Onset   • Lung cancer Mother      smoker;  age 64   • Heart attack Father 64   • Urolithiasis Other      all siblings         **Deborah Disclaimer:   Much of this encounter note is an electronic transcription/translation of spoken language to printed text. The electronic translation of spoken language may permit erroneous, or at times, nonsensical words or phrases to be inadvertently transcribed. Although I have reviewed the note for such errors, some may still exist.

## 2017-10-03 PROBLEM — K27.9 PUD (PEPTIC ULCER DISEASE): Status: ACTIVE | Noted: 2017-10-03

## 2017-10-17 ENCOUNTER — OFFICE VISIT (OUTPATIENT)
Dept: INTERNAL MEDICINE | Age: 72
End: 2017-10-17

## 2017-10-17 VITALS
OXYGEN SATURATION: 98 % | DIASTOLIC BLOOD PRESSURE: 66 MMHG | TEMPERATURE: 98.4 F | SYSTOLIC BLOOD PRESSURE: 118 MMHG | HEART RATE: 68 BPM | BODY MASS INDEX: 28.93 KG/M2 | HEIGHT: 66 IN | WEIGHT: 180 LBS

## 2017-10-17 DIAGNOSIS — I10 ESSENTIAL HYPERTENSION: Primary | Chronic | ICD-10-CM

## 2017-10-17 DIAGNOSIS — E78.2 MIXED HYPERLIPIDEMIA: Chronic | ICD-10-CM

## 2017-10-17 PROCEDURE — 99212 OFFICE O/P EST SF 10 MIN: CPT | Performed by: INTERNAL MEDICINE

## 2017-10-17 NOTE — PROGRESS NOTES
"[facility] [place] [facility]NAL MEDICINE  FLORIAN CONKLIN M.D.      Nicolás DONALDSON Wayne / 72 y.o. / male  10/17/2017    VITALS:    /66  Pulse 68  Temp 98.4 °F (36.9 °C)  Ht 66\" (167.6 cm)  Wt 180 lb (81.6 kg)  SpO2 98%  BMI 29.05 kg/m2    BP Readings from Last 3 Encounters:   10/17/17 118/66   10/02/17 112/74   09/27/17 155/72     Wt Readings from Last 3 Encounters:   10/17/17 180 lb (81.6 kg)   10/02/17 181 lb 3.2 oz (82.2 kg)   09/26/17 178 lb (80.7 kg)      Body mass index is 29.05 kg/(m^2).    CC:  Main reason(s) for today's visit: Hypertension      HPI:     Today's appointment was originally for pre-op clearance for planned THR which has not been postponed for later part of November due to patient's brother developing terminal cancer.     Chronic essential hypertension:  Since prior visit: compliant with medication(s) and denies significant problems with medication(s).  Most recent in-office blood pressure readings:   BP Readings from Last 3 Encounters:   10/17/17 118/66   10/02/17 112/74   09/27/17 155/72     On simvastatin w/o problems for hyperlipidemia. Has not fasted today.     Patient Care Team:  Eusebio Conklin MD as PCP - General (Internal Medicine)  Teodoro Ling MD as Consulting Physician (Orthopedic Surgery)  ____________________________________________________________________    ASSESSMENT & PLAN:    Problem List Items Addressed This Visit     Hypertension - Primary (Chronic)    Overview     Stable. Continue amlodipine and benazepril qd.          Relevant Medications    amLODIPine (NORVASC) 5 MG tablet    benazepril (LOTENSIN) 20 MG tablet    hydrochlorothiazide (HYDRODIURIL) 12.5 MG tablet    Mixed hyperlipidemia (Chronic)    Overview     Stable. Continue simvastatin.           Current Assessment & Plan     Check fasting labs on f/u.         Relevant Medications    simvastatin (ZOCOR) 20 MG tablet        No orders of the defined types were placed in this encounter.      Summary/Discussion:     ·     Return in " 4 weeks (on 11/13/2017) for Pre-op Clearance and cholesterol, COME FASTING FOR LABS.    Future Appointments  Date Time Provider Department Center   11/13/2017 2:00 PM MD [facility] None       ____________________________________________________________________    REVIEW OF SYSTEMS    Review of Systems  As noted per HPI  Constitutional neg  Resp neg  CV neg  Other: As noted per HPI      PHYSICAL EXAMINATION    Physical Exam  Constitutional  No distress  Cardiovascular Rate  normal . Rhythm: regular . Heart sounds:  Normal  Pulmonary/Chest  Effort normal. Breath sounds:  Normal  Psychiatric  Alert. Judgment and thought content normal. Mood normal    REVIEWED DATA:    Labs:     Lab Results   Component Value Date     (H) 09/27/2017    K 3.7 09/27/2017    AST 15 09/27/2017    ALT 12 09/27/2017    BUN 12 09/27/2017    CREATININE 0.88 09/27/2017    CREATININE 0.89 09/26/2017    CREATININE 1.05 09/25/2017    EGFRIFNONA 86 09/27/2017       No results found for: HGBA1C, GLU, MICROALBUR    No results found for: LDL, HDL, TRIG, CHOLHDLRATIO    No results found for: TSH, FREET4    Lab Results   Component Value Date    WBC 7.73 09/27/2017    HGB 10.5 (L) 09/27/2017    HGB 10.7 (L) 09/26/2017    HGB 10.9 (L) 09/26/2017    HGB 10.9 (L) 09/26/2017     09/27/2017       Imaging:         Medical Tests:         Summary of old records / correspondence / consultant report:         Request outside records:         ALLERGIES  No Known Allergies     MEDICATIONS  Current Outpatient Prescriptions   Medication Sig Dispense Refill   • acetaminophen (TYLENOL ARTHRITIS PAIN) 650 MG 8 hr tablet Take 1 tablet by mouth Every 8 (Eight) Hours As Needed for Mild Pain  or Moderate Pain .     • amLODIPine (NORVASC) 5 MG tablet Take 1 tablet by mouth Daily. 30 tablet 3   • benazepril (LOTENSIN) 20 MG tablet Take 1 tablet by mouth Daily. 90 tablet 1   • hydrochlorothiazide (HYDRODIURIL) 12.5 MG tablet Take 12.5 mg by mouth Daily.      • pantoprazole (PROTONIX) 40 MG EC tablet Take 1 tablet by mouth Daily. 90 tablet 0   • simvastatin (ZOCOR) 20 MG tablet Take 1 tablet by mouth Every Night.     • glucosamine-chondroitin 500-400 MG capsule capsule Take 2 capsules by mouth Daily.     • Multiple Vitamins-Minerals (MULTIVITAMIN ADULT PO) Take 1 tablet by mouth Daily.     • Selenium 200 MCG capsule Take 200 mcg by mouth Daily.     • vitamin E 400 UNIT capsule Take 400 Units by mouth Daily.       No current facility-administered medications for this visit.        PFSH:     The following portions of the patient's history were reviewed and updated as appropriate: Allergies / Current Medications / Past Medical History / Surgical History / Social History / Family History    PROBLEM LIST   Patient Active Problem List   Diagnosis   • Hypertension   • Mixed hyperlipidemia   • Overweight (BMI 25.0-29.9)   • Primary osteoarthritis involving multiple joints   • Gastric ulcer with hemorrhage   • H/O: GI bleed   • PUD (peptic ulcer disease)       PAST MEDICAL HISTORY  Past Medical History:   Diagnosis Date   • Broken legs    • Elevated PSA    • Hyperlipidemia    • Hypertension        SURGICAL HISTORY  Past Surgical History:   Procedure Laterality Date   • COLONOSCOPY N/A 9/27/2017    Procedure: COLONOSCOPY into cecum and TI;  Surgeon: Braydon DHALIWAL MD;  Location: [facility] ENDOSCOPY;  Service:    • CRANIOTOMY      age of 18y ; blood clot    • ENDOSCOPY N/A 9/26/2017    Procedure: ESOPHAGOGASTRODUODENOSCOPY WITH BOPSIES;  Surgeon: Hunter Clayton MD;  Location: [facility] ENDOSCOPY;  Service:    • KNEE ARTHROPLASTY     • TONSILLECTOMY     • VASECTOMY         SOCIAL HISTORY  Social History     Social History   • Marital status:      Spouse name: Surya*   • Number of children: 2   • Years of education: N/A     Occupational History   • 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)      Social History Main Topics   • Smoking status: Former Smoker     Years: 30.00     Types:  Cigarettes     Quit date:    • Smokeless tobacco: Never Used      Comment: quit 20 years ago   • Alcohol use Yes      Comment: 1 drink per week   • Drug use: No   • Sexual activity: Yes     Partners: Female     Other Topics Concern   • None     Social History Narrative       FAMILY HISTORY  Family History   Problem Relation Age of Onset   • Lung cancer Mother      smoker;  age 64   • Heart attack Father 64   • Urolithiasis Other      all siblings   • Esophageal cancer Brother          **Deborah Disclaimer:   Much of this encounter note is an electronic transcription/translation of spoken language to printed text. The electronic translation of spoken language may permit erroneous, or at times, nonsensical words or phrases to be inadvertently transcribed. Although I have reviewed the note for such errors, some may still exist.

## 2017-11-07 DIAGNOSIS — E78.2 MIXED HYPERLIPIDEMIA: Chronic | ICD-10-CM

## 2017-11-07 RX ORDER — SIMVASTATIN 20 MG
20 TABLET ORAL NIGHTLY
Qty: 30 TABLET | Refills: 1 | Status: SHIPPED | OUTPATIENT
Start: 2017-11-07 | End: 2017-11-13

## 2017-11-13 ENCOUNTER — OFFICE VISIT (OUTPATIENT)
Dept: INTERNAL MEDICINE | Age: 72
End: 2017-11-13

## 2017-11-13 VITALS
SYSTOLIC BLOOD PRESSURE: 126 MMHG | BODY MASS INDEX: 29.25 KG/M2 | WEIGHT: 182 LBS | TEMPERATURE: 98.4 F | OXYGEN SATURATION: 98 % | HEART RATE: 68 BPM | DIASTOLIC BLOOD PRESSURE: 66 MMHG | HEIGHT: 66 IN

## 2017-11-13 DIAGNOSIS — K25.0 ACUTE GASTRIC ULCER WITH HEMORRHAGE: ICD-10-CM

## 2017-11-13 DIAGNOSIS — M16.11 PRIMARY OSTEOARTHRITIS OF RIGHT HIP: Chronic | ICD-10-CM

## 2017-11-13 DIAGNOSIS — E78.2 MIXED HYPERLIPIDEMIA: Primary | Chronic | ICD-10-CM

## 2017-11-13 DIAGNOSIS — I10 ESSENTIAL HYPERTENSION: Chronic | ICD-10-CM

## 2017-11-13 PROBLEM — M16.10 PRIMARY OSTEOARTHRITIS OF HIP: Chronic | Status: ACTIVE | Noted: 2017-06-19

## 2017-11-13 PROCEDURE — 99214 OFFICE O/P EST MOD 30 MIN: CPT | Performed by: INTERNAL MEDICINE

## 2017-11-13 RX ORDER — ROSUVASTATIN CALCIUM 10 MG/1
10 TABLET, COATED ORAL NIGHTLY
Qty: 30 TABLET | Refills: 3 | Status: SHIPPED | OUTPATIENT
Start: 2017-11-13 | End: 2018-05-22 | Stop reason: SDUPTHER

## 2017-11-13 NOTE — ASSESSMENT & PLAN NOTE
2/2017 labs: LDL >120s, HDL 42. Due to being on amlodipine, will DC simvastatin and switch to rosuvastatin 10 mg. Recheck labs on f/u in 3 months.

## 2017-11-13 NOTE — PROGRESS NOTES
"[facility] [place] [facility]  FLORIAN SEAMAN M.D.      Nicolás DONALDSON Wayne / 72 y.o. / male  11/13/2017    VITALS:    /66  Pulse 68  Temp 98.4 °F (36.9 °C)  Ht 66\" (167.6 cm)  Wt 182 lb (82.6 kg)  SpO2 98%  BMI 29.38 kg/m2    BP Readings from Last 3 Encounters:   11/13/17 126/66   10/17/17 118/66   10/02/17 112/74     Wt Readings from Last 3 Encounters:   11/13/17 182 lb (82.6 kg)   10/17/17 180 lb (81.6 kg)   10/02/17 181 lb 3.2 oz (82.2 kg)      Body mass index is 29.38 kg/(m^2).    CC:  Main reason(s) for today's visit: Surgical Clearance ( hip surgery )      HPI:     Back for the 2nd time re: medical clearance for right THR. Planned for about a week from now.   No h/o heart disease or stroke. Denies exertional cp, [place], palpitations, pnd/orthopnea. Recent episode of PUD and gastric bleed from Nsaid use. Planned for repeat EGD early December. Denies melena or blood stool or abdominal pain.     Chronic essential hypertension:  Since prior visit: compliant with medication(s) and denies significant problems with medication(s).  Most recent in-office blood pressure readings:   BP Readings from Last 3 Encounters:   11/13/17 126/66   10/17/17 118/66   10/02/17 112/74     Chronic hyperlipidemia:  Current therapy include simvastatin, denies problems with medication. Outside labs reviewed from 2/2017 LDL >120, HDL 42. On amlodipine for hypertension.        Patient Care Team:  Eusebio Seaman MD as PCP - General (Internal Medicine)  Teodoro Ling MD as Consulting Physician (Orthopedic Surgery)  ____________________________________________________________________    ASSESSMENT & PLAN:    Problem List Items Addressed This Visit     Primary osteoarthritis of hip (Chronic)    Current Assessment & Plan     Due to recent significant GI bleed due to Nsaid related PUD/gastric bleed and consideration for DVT prophylaxis perioperatively, I suggested delaying surgery until his repeat EGD in early December. He will d/w Dr. Ling.    "       Relevant Medications    acetaminophen (TYLENOL ARTHRITIS PAIN) 650 MG 8 hr tablet    Gastric ulcer with hemorrhage    Overview     Due to NSAIDS.          Current Assessment & Plan     Refer to above hip OA section.          Relevant Medications    pantoprazole (PROTONIX) 40 MG EC tablet    Hypertension (Chronic)    Overview     Stable. Continue amlodipine and benazepril qd.          Relevant Medications    amLODIPine (NORVASC) 5 MG tablet    benazepril (LOTENSIN) 20 MG tablet    hydrochlorothiazide (HYDRODIURIL) 12.5 MG tablet    Mixed hyperlipidemia - Primary (Chronic)    Current Assessment & Plan     2/2017 labs: LDL >120s, HDL 42. Due to being on amlodipine, will DC simvastatin and switch to rosuvastatin 10 mg. Recheck labs on f/u in 3 months.          Relevant Medications    rosuvastatin (CRESTOR) 10 MG tablet        No orders of the defined types were placed in this encounter.      Summary/Discussion:  ·     Return in about 3 months (around 2/13/2018) for Hyperlipidemia, Hypertension, COME FASTING FOR LABS.    Future Appointments  Date Time Provider Department Center   2/13/2018 10:40 AM Eusebio Seaman MD [facility] None       ____________________________________________________________________    REVIEW OF SYSTEMS    Review of Systems  As noted per HPI  Constitutional neg  Resp neg  CV neg  Gi neg for melena or blood   neg for dysuria  Right hip pain from OA      PHYSICAL EXAMINATION    Physical Exam  Constitutional  No distress, obese  Cardiovascular Rate  normal . Rhythm: regular . Heart sounds:  Normal. Neg for carotid bruit  Pulmonary/Chest  Effort normal. Breath sounds:  Normal  Abdomen protuberant, nontender, soft  Psychiatric  Alert. Judgment and thought content normal. Mood normal    REVIEWED DATA:    Labs:     Lab Results   Component Value Date     (H) 09/27/2017    K 3.7 09/27/2017    AST 15 09/27/2017    ALT 12 09/27/2017    BUN 12 09/27/2017    CREATININE 0.88 09/27/2017    CREATININE  0.89 09/26/2017    CREATININE 1.05 09/25/2017    EGFRIFNONA 86 09/27/2017       No results found for: HGBA1C, GLU, MICROALBUR    No results found for: LDL, HDL, TRIG, CHOLHDLRATIO    No results found for: TSH, FREET4    Lab Results   Component Value Date    WBC 7.73 09/27/2017    HGB 10.5 (L) 09/27/2017    HGB 10.7 (L) 09/26/2017    HGB 10.9 (L) 09/26/2017    HGB 10.9 (L) 09/26/2017     09/27/2017       Imaging:         Medical Tests:         Summary of old records / correspondence / consultant report:         Request outside records:   ** Outside records requested and obtained through [facility] Everywhere. I reviewed  the the following information from [facility] : lab results : normal kidney function, no anemia, normal wbc/plts and test results : normal EKG         ALLERGIES  No Known Allergies     MEDICATIONS  Current Outpatient Prescriptions   Medication Sig Dispense Refill   • acetaminophen (TYLENOL ARTHRITIS PAIN) 650 MG 8 hr tablet Take 1 tablet by mouth Every 8 (Eight) Hours As Needed for Mild Pain  or Moderate Pain .     • amLODIPine (NORVASC) 5 MG tablet Take 1 tablet by mouth Daily. 30 tablet 3   • benazepril (LOTENSIN) 20 MG tablet Take 1 tablet by mouth Daily. 90 tablet 1   • hydrochlorothiazide (HYDRODIURIL) 12.5 MG tablet Take 12.5 mg by mouth Daily.     • pantoprazole (PROTONIX) 40 MG EC tablet Take 1 tablet by mouth Daily. 90 tablet 0   • glucosamine-chondroitin 500-400 MG capsule capsule Take 2 capsules by mouth Daily.     • Multiple Vitamins-Minerals (MULTIVITAMIN ADULT PO) Take 1 tablet by mouth Daily.     • Simvastatin 20 MG tablet Take 1 tablet by mouth Every Night. 30 tablet 3   • Selenium 200 MCG capsule Take 200 mcg by mouth Daily.     • vitamin E 400 UNIT capsule Take 400 Units by mouth Daily.       No current facility-administered medications for this visit.        PFSH:     The following portions of the patient's history were reviewed and updated as appropriate: Allergies / Current  Medications / Past Medical History / Surgical History / Social History / Family History    PROBLEM LIST   Patient Active Problem List   Diagnosis   • Hypertension   • Mixed hyperlipidemia   • Overweight (BMI 25.0-29.9)   • Primary osteoarthritis of hip   • Gastric ulcer with hemorrhage   • H/O: GI bleed   • PUD (peptic ulcer disease)       PAST MEDICAL HISTORY  Past Medical History:   Diagnosis Date   • Broken legs    • Elevated PSA    • Hyperlipidemia    • Hypertension        SURGICAL HISTORY  Past Surgical History:   Procedure Laterality Date   • COLONOSCOPY N/A 2017    Procedure: COLONOSCOPY into cecum and TI;  Surgeon: Braydon DHALIWAL MD;  Location: [facility] ENDOSCOPY;  Service:    • CRANIOTOMY      age of 18y ; blood clot    • ENDOSCOPY N/A 2017    Procedure: ESOPHAGOGASTRODUODENOSCOPY WITH BOPSIES;  Surgeon: Hunter Clayton MD;  Location: [facility] ENDOSCOPY;  Service:    • KNEE ARTHROPLASTY     • TONSILLECTOMY     • VASECTOMY         SOCIAL HISTORY  Social History     Social History   • Marital status:      Spouse name: Surya*   • Number of children: 2   • Years of education: N/A     Occupational History   • 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)      Social History Main Topics   • Smoking status: Former Smoker     Years: 30.00     Types: Cigarettes     Quit date:    • Smokeless tobacco: Never Used      Comment: quit 20 years ago   • Alcohol use Yes      Comment: 1 drink per week   • Drug use: No   • Sexual activity: Yes     Partners: Female     Other Topics Concern   • None     Social History Narrative       FAMILY HISTORY  Family History   Problem Relation Age of Onset   • Lung cancer Mother      smoker;  age 64   • Heart attack Father 64   • Urolithiasis Other      all siblings   • Esophageal cancer Brother          **Deborah Disclaimer:   Much of this encounter note is an electronic transcription/translation of spoken language to printed text. The electronic translation of spoken language  may permit erroneous, or at times, nonsensical words or phrases to be inadvertently transcribed. Although I have reviewed the note for such errors, some may still exist.

## 2017-11-13 NOTE — ASSESSMENT & PLAN NOTE
Due to recent significant GI bleed due to Nsaid related PUD/gastric bleed and consideration for DVT prophylaxis perioperatively, I suggested delaying surgery until his repeat EGD in early December. He will d/w Dr. Ling.

## 2017-11-17 DIAGNOSIS — I10 ESSENTIAL HYPERTENSION: Chronic | ICD-10-CM

## 2017-11-17 RX ORDER — AMLODIPINE BESYLATE 5 MG/1
TABLET ORAL
Qty: 30 TABLET | Refills: 3 | Status: SHIPPED | OUTPATIENT
Start: 2017-11-17 | End: 2018-04-05 | Stop reason: SDUPTHER

## 2017-12-21 ENCOUNTER — ANESTHESIA EVENT (OUTPATIENT)
Dept: GASTROENTEROLOGY | Facility: HOSPITAL | Age: 72
End: 2017-12-21

## 2017-12-21 ENCOUNTER — ANESTHESIA (OUTPATIENT)
Dept: GASTROENTEROLOGY | Facility: HOSPITAL | Age: 72
End: 2017-12-21

## 2017-12-21 ENCOUNTER — HOSPITAL ENCOUNTER (OUTPATIENT)
Facility: HOSPITAL | Age: 72
Setting detail: HOSPITAL OUTPATIENT SURGERY
Discharge: HOME | End: 2017-12-21
Attending: INTERNAL MEDICINE | Admitting: INTERNAL MEDICINE

## 2017-12-21 VITALS
DIASTOLIC BLOOD PRESSURE: 62 MMHG | SYSTOLIC BLOOD PRESSURE: 128 MMHG | RESPIRATION RATE: 16 BRPM | HEART RATE: 63 BPM | BODY MASS INDEX: 29.41 KG/M2 | OXYGEN SATURATION: 98 % | WEIGHT: 183 LBS | HEIGHT: 66 IN | TEMPERATURE: 98.1 F

## 2017-12-21 DIAGNOSIS — K27.9 PUD (PEPTIC ULCER DISEASE): ICD-10-CM

## 2017-12-21 DIAGNOSIS — I10 ESSENTIAL HYPERTENSION: Chronic | ICD-10-CM

## 2017-12-21 PROCEDURE — 25010000002 PROPOFOL 10 MG/ML EMULSION: Performed by: ANESTHESIOLOGY

## 2017-12-21 PROCEDURE — 88305 TISSUE EXAM BY PATHOLOGIST: CPT | Performed by: INTERNAL MEDICINE

## 2017-12-21 PROCEDURE — S0260 H&P FOR SURGERY: HCPCS | Performed by: INTERNAL MEDICINE

## 2017-12-21 PROCEDURE — 88312 SPECIAL STAINS GROUP 1: CPT | Performed by: INTERNAL MEDICINE

## 2017-12-21 PROCEDURE — 43239 EGD BIOPSY SINGLE/MULTIPLE: CPT | Performed by: INTERNAL MEDICINE

## 2017-12-21 RX ORDER — SODIUM CHLORIDE, SODIUM LACTATE, POTASSIUM CHLORIDE, CALCIUM CHLORIDE 600; 310; 30; 20 MG/100ML; MG/100ML; MG/100ML; MG/100ML
1000 INJECTION, SOLUTION INTRAVENOUS CONTINUOUS PRN
Status: DISCONTINUED | OUTPATIENT
Start: 2017-12-21 | End: 2017-12-21 | Stop reason: HOSPADM

## 2017-12-21 RX ORDER — PROPOFOL 10 MG/ML
VIAL (ML) INTRAVENOUS CONTINUOUS PRN
Status: DISCONTINUED | OUTPATIENT
Start: 2017-12-21 | End: 2017-12-21 | Stop reason: SURG

## 2017-12-21 RX ORDER — PROPOFOL 10 MG/ML
VIAL (ML) INTRAVENOUS AS NEEDED
Status: DISCONTINUED | OUTPATIENT
Start: 2017-12-21 | End: 2017-12-21 | Stop reason: SURG

## 2017-12-21 RX ORDER — SODIUM CHLORIDE 0.9 % (FLUSH) 0.9 %
3 SYRINGE (ML) INJECTION AS NEEDED
Status: DISCONTINUED | OUTPATIENT
Start: 2017-12-21 | End: 2017-12-21 | Stop reason: HOSPADM

## 2017-12-21 RX ORDER — PANTOPRAZOLE SODIUM 40 MG/1
40 TABLET, DELAYED RELEASE ORAL DAILY
Qty: 30 TABLET | Refills: 12 | Status: SHIPPED | OUTPATIENT
Start: 2017-12-21 | End: 2019-02-03 | Stop reason: SDUPTHER

## 2017-12-21 RX ORDER — HYDROCHLOROTHIAZIDE 12.5 MG/1
TABLET ORAL
Qty: 30 TABLET | Refills: 3 | Status: SHIPPED | OUTPATIENT
Start: 2017-12-21 | End: 2018-12-12

## 2017-12-21 RX ORDER — LIDOCAINE HYDROCHLORIDE 10 MG/ML
0.5 INJECTION, SOLUTION INFILTRATION; PERINEURAL ONCE AS NEEDED
Status: DISCONTINUED | OUTPATIENT
Start: 2017-12-21 | End: 2017-12-21 | Stop reason: HOSPADM

## 2017-12-21 RX ORDER — LIDOCAINE HYDROCHLORIDE 20 MG/ML
INJECTION, SOLUTION INFILTRATION; PERINEURAL AS NEEDED
Status: DISCONTINUED | OUTPATIENT
Start: 2017-12-21 | End: 2017-12-21 | Stop reason: SURG

## 2017-12-21 RX ADMIN — SODIUM CHLORIDE, POTASSIUM CHLORIDE, SODIUM LACTATE AND CALCIUM CHLORIDE 1000 ML: 600; 310; 30; 20 INJECTION, SOLUTION INTRAVENOUS at 07:49

## 2017-12-21 RX ADMIN — PROPOFOL 100 MCG/KG/MIN: 10 INJECTION, EMULSION INTRAVENOUS at 08:05

## 2017-12-21 RX ADMIN — LIDOCAINE HYDROCHLORIDE 60 MG: 20 INJECTION, SOLUTION INFILTRATION; PERINEURAL at 08:05

## 2017-12-21 RX ADMIN — PROPOFOL 150 MG: 10 INJECTION, EMULSION INTRAVENOUS at 08:05

## 2017-12-21 RX ADMIN — SODIUM CHLORIDE, POTASSIUM CHLORIDE, SODIUM LACTATE AND CALCIUM CHLORIDE: 600; 310; 30; 20 INJECTION, SOLUTION INTRAVENOUS at 08:05

## 2017-12-21 NOTE — PLAN OF CARE
Problem: Patient Care Overview (Adult)  Goal: Plan of Care Review  Outcome: Ongoing (interventions implemented as appropriate)   12/21/17 0728   Coping/Psychosocial Response Interventions   Plan Of Care Reviewed With patient   Patient Care Overview   Progress no change     Goal: Adult Individualization and Mutuality  Outcome: Ongoing (interventions implemented as appropriate)    Goal: Discharge Needs Assessment  Outcome: Ongoing (interventions implemented as appropriate)   12/21/17 0728   Discharge Needs Assessment   Concerns To Be Addressed no discharge needs identified   Discharge Disposition home or self-care   Living Environment   Transportation Available car;family or friend will provide       Problem: GI Endoscopy (Adult)  Goal: Signs and Symptoms of Listed Potential Problems Will be Absent or Manageable (GI Endoscopy)  Outcome: Ongoing (interventions implemented as appropriate)   12/21/17 0728   GI Endoscopy   Problems Assessed (GI Endoscopy) pain   Problems Present (GI Endoscopy) none

## 2017-12-21 NOTE — ANESTHESIA PREPROCEDURE EVALUATION
Anesthesia Evaluation     Patient summary reviewed and Nursing notes reviewed   NPO Solid Status: > 8 hours  NPO Liquid Status: > 8 hours     Airway   Mallampati: I  TM distance: <3 FB  Neck ROM: full  no difficulty expected  Dental - normal exam     Pulmonary - negative pulmonary ROS and normal exam   Cardiovascular - normal exam  Exercise tolerance: poor (<4 METS)    (+) hypertension, hyperlipidemia  (-) cardiac stents      Neuro/Psych- negative ROS  GI/Hepatic/Renal/Endo    (+)  PUD,     Musculoskeletal     Abdominal  - normal exam    Bowel sounds: normal.   Substance History - negative use     OB/GYN negative ob/gyn ROS         Other   (+) arthritis                                 Anesthesia Plan    ASA 2     MAC     Anesthetic plan and risks discussed with patient.

## 2017-12-21 NOTE — ANESTHESIA POSTPROCEDURE EVALUATION
"Patient: Nicolás Black    Procedure Summary     Date Anesthesia Start Anesthesia Stop Room / Location    12/21/17 0808 0829  [facility] /  [facility]       Procedure Diagnosis Surgeon Provider    ESOPHAGOGASTRODUODENOSCOPY with biopsies (N/A Esophagus) PUD (peptic ulcer disease)  (PUD (peptic ulcer disease) [K27.9]) MD Arash Madrigal MD          Anesthesia Type: MAC  Last vitals  BP   122/72 (12/21/17 0837)   Temp   36.7 °C (98.1 °F) (12/21/17 0826)   Pulse   70 (12/21/17 0837)   Resp   16 (12/21/17 0837)     SpO2   97 % (12/21/17 0837)     Post Anesthesia Care and Evaluation    Patient location during evaluation: PACU  Patient participation: complete - patient participated  Level of consciousness: awake  Pain score: 0  Pain management: adequate  Airway patency: patent  Anesthetic complications: No anesthetic complications    Cardiovascular status: acceptable  Respiratory status: acceptable  Hydration status: acceptable    Comments: Blood pressure 122/72, pulse 70, temperature 36.7 °C (98.1 °F), temperature source Oral, resp. rate 16, height 167.6 cm (66\"), weight 83 kg (183 lb), SpO2 97 %.    No anesthesia care post op    "

## 2017-12-22 LAB
CYTO UR: NORMAL
LAB AP CASE REPORT: NORMAL
Lab: NORMAL
PATH REPORT.FINAL DX SPEC: NORMAL
PATH REPORT.GROSS SPEC: NORMAL

## 2018-01-11 ENCOUNTER — TELEPHONE (OUTPATIENT)
Dept: GASTROENTEROLOGY | Facility: CLINIC | Age: 73
End: 2018-01-11

## 2018-01-11 NOTE — TELEPHONE ENCOUNTER
----- Message from Hunter Clayton MD sent at 12/24/2017 11:57 AM EST -----  barretts no dyplasia. egd recall 3 yrs, ov 6 mos

## 2018-01-11 NOTE — TELEPHONE ENCOUNTER
Pt returned call per a staff message.     Called pt back. Advised that per Dr Clayton: the path results from his upper endoscopy showed johnson's esophagus but no precancerous changes. Advised that MD recommends to repeat the EGD in 3 years and follow up in the office in 6 months. Pt verb understanding and will call back to make appt.     EGD placed in recall for 12/2020.  F/U placed in recall for 6/2018.

## 2018-02-08 ENCOUNTER — OFFICE VISIT (OUTPATIENT)
Dept: INTERNAL MEDICINE | Age: 73
End: 2018-02-08

## 2018-02-08 ENCOUNTER — TELEPHONE (OUTPATIENT)
Dept: GASTROENTEROLOGY | Facility: CLINIC | Age: 73
End: 2018-02-08

## 2018-02-08 VITALS
WEIGHT: 180 LBS | TEMPERATURE: 98.1 F | DIASTOLIC BLOOD PRESSURE: 70 MMHG | HEIGHT: 66 IN | SYSTOLIC BLOOD PRESSURE: 130 MMHG | BODY MASS INDEX: 28.93 KG/M2 | HEART RATE: 64 BPM | OXYGEN SATURATION: 96 %

## 2018-02-08 DIAGNOSIS — E78.2 MIXED HYPERLIPIDEMIA: Chronic | ICD-10-CM

## 2018-02-08 DIAGNOSIS — I10 ESSENTIAL HYPERTENSION: Chronic | ICD-10-CM

## 2018-02-08 DIAGNOSIS — Z01.818 PREOPERATIVE CLEARANCE: Primary | ICD-10-CM

## 2018-02-08 DIAGNOSIS — E66.3 OVERWEIGHT (BMI 25.0-29.9): Chronic | ICD-10-CM

## 2018-02-08 DIAGNOSIS — M16.11 PRIMARY OSTEOARTHRITIS OF RIGHT HIP: Chronic | ICD-10-CM

## 2018-02-08 DIAGNOSIS — E74.8 OTHER SPECIFIED DISORDERS OF CARBOHYDRATE METABOLISM (CODE): ICD-10-CM

## 2018-02-08 PROCEDURE — 99214 OFFICE O/P EST MOD 30 MIN: CPT | Performed by: INTERNAL MEDICINE

## 2018-02-08 NOTE — TELEPHONE ENCOUNTER
Per Dr. Clayton: Yes he will need to take it to prevent DVT or PE.  Patient called, advised as per Dr. Clayton's note he will need to take Eliquis to prevent a DVT or a PE.  Patient verb understanding.

## 2018-02-08 NOTE — PROGRESS NOTES
"[facility]  FLORIAN CONKLIN M.D.      Nicolás DONALDSON Wayne / 72 y.o. / male  02/08/2018      MEDICATIONS  Current Outpatient Prescriptions   Medication Sig Dispense Refill   • acetaminophen (TYLENOL ARTHRITIS PAIN) 650 MG 8 hr tablet Take 1 tablet by mouth Every 8 (Eight) Hours As Needed for Mild Pain  or Moderate Pain .     • amLODIPine (NORVASC) 5 MG tablet TAKE ONE TABLET BY MOUTH ONCE DAILY 30 tablet 3   • benazepril (LOTENSIN) 20 MG tablet Take 1 tablet by mouth Daily. 90 tablet 1   • glucosamine-chondroitin 500-400 MG capsule capsule Take 2 capsules by mouth Daily.     • hydrochlorothiazide (HYDRODIURIL) 12.5 MG tablet TAKE ONE TABLET BY MOUTH ONCE DAILY 30 tablet 3   • Multiple Vitamins-Minerals (MULTIVITAMIN ADULT PO) Take 1 tablet by mouth Daily.     • NON FORMULARY Tumeric     • pantoprazole (PROTONIX) 40 MG EC tablet Take 1 tablet by mouth Daily. 30 tablet 12   • rosuvastatin (CRESTOR) 10 MG tablet Take 1 tablet by mouth Every Night. 30 tablet 3   • Selenium 200 MCG capsule Take 200 mcg by mouth Daily.     • vitamin E 400 UNIT capsule Take 400 Units by mouth Daily.       No current facility-administered medications for this visit.          VITALS:    Visit Vitals   • /70   • Pulse 64   • Temp 98.1 °F (36.7 °C)   • Ht 167.6 cm (65.98\")   • Wt 81.6 kg (180 lb)   • SpO2 96%   • BMI 29.07 kg/m2       BP Readings from Last 3 Encounters:   02/08/18 130/70   12/21/17 128/62   11/13/17 126/66     Wt Readings from Last 3 Encounters:   02/08/18 81.6 kg (180 lb)   12/21/17 83 kg (183 lb)   11/13/17 82.6 kg (182 lb)      Body mass index is 29.07 kg/(m^2).    CC:  Main reason(s) for today's visit: Hypertension; Hypothyroidism; Surgical Clearance (hip Surg. Pre op testing in care everyware); and Hyperlipidemia      HPI:     Preop clearance for planned right THR on 2/27/18.   No chest pain, [place], palpitations. No heart dz hx.    Chronic essential hypertension:  Since prior visit: compliant with medication(s) and " denies significant problems with medication(s).  Most recent in-office blood pressure readings:   BP Readings from Last 3 Encounters:   02/08/18 130/70   12/21/17 128/62   11/13/17 126/66     Chronic hyperlipidemia:  Current therapy include rosuvastatin, denies problems with medication.    Most recent labs: No results found for: LDL, HDL, TRIG, CHOLHDLRATIO    Obesity: Weight has not changed significantly since last visit.  Weight loss efforts: no specific plan(s).  Current Body mass index is 29.07 kg/(m^2)..   Wt Readings from Last 3 Encounters:   02/08/18 81.6 kg (180 lb)   12/21/17 83 kg (183 lb)   11/13/17 82.6 kg (182 lb)         Patient Care Team:  Eusebio Seaman MD as PCP - General (Internal Medicine)  Teodoro Ling MD as Consulting Physician (Orthopedic Surgery)  ____________________________________________________________________    ASSESSMENT & PLAN:    1. Preoperative clearance    2. Primary osteoarthritis of right hip    3. Essential hypertension    4. Mixed hyperlipidemia    5. Overweight (BMI 25.0-29.9)    6. Other specified disorders of carbohydrate metabolism (CODE)       Orders Placed This Encounter   Procedures   • Lipid Panel With / Chol / HDL Ratio   • Hemoglobin A1c   • TSH+Free T4          Summary/Discussion:  · Cleared for surgery. Form will be sent to his orthopedist.   · Continue current meds  · Check labs today    Return in about 4 months (around 6/8/2018) for COMBINED SUBSEQUENT AWV and medical f/u (40 min).    No future appointments.    ____________________________________________________________________    REVIEW OF SYSTEMS    Review of Systems  Constitutional neg  Resp neg  CV neg  Gi neg for melena or blood  Gu neg  Right hip pain      PHYSICAL EXAMINATION    Physical Exam  Constitutional  No distress, overweight  Cardiovascular Rate  normal . Rhythm: regular . Heart sounds:  Normal. No carotid bruit. No BLE edema  Pulmonary/Chest  Effort normal. Breath sounds:  Normal  Psychiatric   Alert. Judgment and thought content normal. Mood normal    REVIEWED DATA:    Labs:       Imaging:         Medical Tests:         Summary of old records / correspondence / consultant report:         Request outside records:   ** Outside records requested and obtained through [facility] Everywhere. I reviewed  the the following information from [facility] : lab results : calcium 10.6, urinalysis neg for infection and test results : ekg nsr       ALLERGIES  No Known Allergies     PFSH:     The following portions of the patient's history were reviewed and updated as appropriate: Allergies / Current Medications / Past Medical History / Surgical History / Social History / Family History    PROBLEM LIST   Patient Active Problem List   Diagnosis   • Hypertension   • Mixed hyperlipidemia   • Overweight (BMI 25.0-29.9)   • Primary osteoarthritis of hip   • Gastric ulcer with hemorrhage   • H/O: GI bleed   • PUD (peptic ulcer disease)       PAST MEDICAL HISTORY  Past Medical History:   Diagnosis Date   • Arthritis    • Broken legs    • Elevated PSA    • Hyperlipidemia    • Hypertension        SURGICAL HISTORY  Past Surgical History:   Procedure Laterality Date   • COLONOSCOPY N/A 9/27/2017    Procedure: COLONOSCOPY into cecum and TI;  Surgeon: Braydon DHALIWAL MD;  Location: [facility] ENDOSCOPY;  Service:    • CRANIOTOMY      age of 18y ; blood clot    • ENDOSCOPY N/A 9/26/2017    Procedure: ESOPHAGOGASTRODUODENOSCOPY WITH BOPSIES;  Surgeon: Hunter Clayton MD;  Location: [facility] ENDOSCOPY;  Service:    • ENDOSCOPY N/A 12/21/2017    Procedure: ESOPHAGOGASTRODUODENOSCOPY with biopsies;  Surgeon: Hunter Clayton MD;  Location: [facility] ENDOSCOPY;  Service:    • KNEE ARTHROPLASTY Right    • VASECTOMY         SOCIAL HISTORY  Social History     Social History   • Marital status:      Spouse name: Surya*   • Number of children: 2   • Years of education: N/A     Occupational History   • 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)      Social  History Main Topics   • Smoking status: Former Smoker     Years: 30.00     Types: Cigarettes     Quit date:    • Smokeless tobacco: Never Used      Comment: quit 20 years ago   • Alcohol use Yes      Comment: 1 drink per week   • Drug use: No   • Sexual activity: Defer     Other Topics Concern   • None     Social History Narrative       FAMILY HISTORY  Family History   Problem Relation Age of Onset   • Lung cancer Mother      smoker;  age 64   • Heart attack Father 64   • Urolithiasis Other      all siblings   • Esophageal cancer Brother          **Deborah Disclaimer:   Much of this encounter note is an electronic transcription/translation of spoken language to printed text. The electronic translation of spoken language may permit erroneous, or at times, nonsensical words or phrases to be inadvertently transcribed. Although I have reviewed the note for such errors, some may still exist.

## 2018-02-08 NOTE — TELEPHONE ENCOUNTER
Called pt back. Pt states he will have a total hip replacement on 2/27/18 and the surgeon wants him to take Eliquis afterwards. He states his PMD wanted him to touch base with Dr Clayton to make sure this is would be OK with him. Pt states he would be taking the Eliquis for 30-35 days post-op. Advised will check with Dr Clayton and call him back. Pt verb understanding.

## 2018-02-08 NOTE — TELEPHONE ENCOUNTER
----- Message from Shabnam Carbajal sent at 2/8/2018  9:51 AM EST -----  Regarding: pt called  Contact: [phone]  Pt is calling to see if it was okay for him to take eliquis for his hip replacement ?? Pt would like a call back.

## 2018-02-08 NOTE — TELEPHONE ENCOUNTER
----- Message from Shabnam Carbajal sent at 2/8/2018  9:51 AM EST -----  Regarding: pt called  Contact: [phone]  Pt is calling to see if it was okay for him to take eliquis for his hip replacement ?? Pt would like a call back.

## 2018-02-09 LAB
CHOLEST SERPL-MCNC: 202 MG/DL (ref 0–200)
CHOLEST/HDLC SERPL: 4.49 {RATIO}
HBA1C MFR BLD: 5.4 % (ref 4.8–5.6)
HDLC SERPL-MCNC: 45 MG/DL (ref 40–60)
LDLC SERPL CALC-MCNC: 101 MG/DL (ref 0–100)
T4 FREE SERPL-MCNC: 1.18 NG/DL (ref 0.93–1.7)
TRIGL SERPL-MCNC: 280 MG/DL (ref 0–150)
TSH SERPL DL<=0.005 MIU/L-ACNC: 4.27 MIU/ML (ref 0.27–4.2)
VLDLC SERPL CALC-MCNC: 56 MG/DL (ref 5–40)

## 2018-03-26 ENCOUNTER — OFFICE VISIT (OUTPATIENT)
Dept: INTERNAL MEDICINE | Age: 73
End: 2018-03-26

## 2018-03-26 VITALS
WEIGHT: 183.4 LBS | OXYGEN SATURATION: 95 % | SYSTOLIC BLOOD PRESSURE: 148 MMHG | HEART RATE: 92 BPM | BODY MASS INDEX: 29.47 KG/M2 | DIASTOLIC BLOOD PRESSURE: 82 MMHG | HEIGHT: 66 IN | TEMPERATURE: 98.3 F

## 2018-03-26 DIAGNOSIS — J06.9 VIRAL URI WITH COUGH: Primary | ICD-10-CM

## 2018-03-26 PROCEDURE — 99213 OFFICE O/P EST LOW 20 MIN: CPT | Performed by: NURSE PRACTITIONER

## 2018-03-26 RX ORDER — OXYCODONE HYDROCHLORIDE AND ACETAMINOPHEN 5; 325 MG/1; MG/1
1-2 TABLET ORAL
COMMUNITY
Start: 2018-02-28 | End: 2018-03-26

## 2018-03-26 NOTE — PATIENT INSTRUCTIONS
"Upper Respiratory Infection, Adult    Most upper respiratory infections (URIs) are a viral infection of the air passages leading to the lungs. A URI affects the nose, throat, and upper air passages. The most common type of URI is nasopharyngitis and is typically referred to as \"the common cold.\"  URIs run their course and usually go away on their own. Most of the time, a URI does not require medical attention, but sometimes a bacterial infection in the upper airways can follow a viral infection. This is called a secondary infection. Sinus and middle ear infections are common types of secondary upper respiratory infections.  Bacterial pneumonia can also complicate a URI. A URI can worsen asthma and chronic obstructive pulmonary disease (COPD). Sometimes, these complications can require emergency medical care and may be life threatening.  What are the causes?  Almost all URIs are caused by viruses. A virus is a type of germ and can spread from one person to another.  What increases the risk?  You may be at risk for a URI if:  · You smoke.  · You have chronic heart or lung disease.  · You have a weakened defense (immune) system.  · You are very young or very old.  · You have nasal allergies or asthma.  · You work in crowded or poorly ventilated areas.  · You work in health care facilities or schools.  What are the signs or symptoms?  Symptoms typically develop 2-3 days after you come in contact with a cold virus. Most viral URIs last 7-10 days. However, viral URIs from the influenza virus (flu virus) can last 14-18 days and are typically more severe. Symptoms may include:  · Runny or stuffy (congested) nose.  · Sneezing.  · Cough.  · Sore throat.  · Headache.  · Fatigue.  · Fever.  · Loss of appetite.  · Pain in your forehead, behind your eyes, and over your cheekbones (sinus pain).  · Muscle aches.  How is this diagnosed?  Your health care provider may diagnose a URI by:  · Physical exam.  · Tests to check that your " symptoms are not due to another condition such as:  ¨ Strep throat.  ¨ Sinusitis.  ¨ Pneumonia.  ¨ Asthma.  How is this treated?  A URI goes away on its own with time. It cannot be cured with medicines, but medicines may be prescribed or recommended to relieve symptoms. Medicines may help:  · Reduce your fever.  · Reduce your cough.  · Relieve nasal congestion.  Follow these instructions at home:  · Take medicines only as directed by your health care provider.  · Gargle warm saltwater or take cough drops to comfort your throat as directed by your health care provider.  · Use a warm mist humidifier or inhale steam from a shower to increase air moisture. This may make it easier to breathe.  · Drink enough fluid to keep your urine clear or pale yellow.  · Eat soups and other clear broths and maintain good nutrition.  · Rest as needed.  · Return to work when your temperature has returned to normal or as your health care provider advises. You may need to stay home longer to avoid infecting others. You can also use a face mask and careful hand washing to prevent spread of the virus.  · Increase the usage of your inhaler if you have asthma.  · Do not use any tobacco products, including cigarettes, chewing tobacco, or electronic cigarettes. If you need help quitting, ask your health care provider.  How is this prevented?  The best way to protect yourself from getting a cold is to practice good hygiene.  · Avoid oral or hand contact with people with cold symptoms.  · Wash your hands often if contact occurs.  There is no clear evidence that vitamin C, vitamin E, echinacea, or exercise reduces the chance of developing a cold. However, it is always recommended to get plenty of rest, exercise, and practice good nutrition.  Contact a health care provider if:  · You are getting worse rather than better.  · Your symptoms are not controlled by medicine.  · You have chills.  · You have worsening shortness of breath.  · You have brown  or red mucus.  · You have yellow or brown nasal discharge.  · You have pain in your face, especially when you bend forward.  · You have a fever.  · You have swollen neck glands.  · You have pain while swallowing.  · You have white areas in the back of your throat.  Get help right away if:  · You have severe or persistent:  ¨ Headache.  ¨ Ear pain.  ¨ Sinus pain.  ¨ Chest pain.  · You have chronic lung disease and any of the following:  ¨ Wheezing.  ¨ Prolonged cough.  ¨ Coughing up blood.  ¨ A change in your usual mucus.  · You have a stiff neck.  · You have changes in your:  ¨ Vision.  ¨ Hearing.  ¨ Thinking.  ¨ Mood.  This information is not intended to replace advice given to you by your health care provider. Make sure you discuss any questions you have with your health care provider.  Document Released: 06/13/2002 Document Revised: 08/20/2017 Document Reviewed: 03/25/2015  [employer] Interactive Patient Education © 2017 [employer]

## 2018-03-26 NOTE — PROGRESS NOTES
Subjective   Nicolás Black is a 72 y.o. male.     URI    This is a new problem. Episode onset: 2 days ago. There has been no fever. Associated symptoms include congestion, coughing, rhinorrhea and sinus pain. Pertinent negatives include no diarrhea, ear pain, nausea, sore throat or vomiting. Associated symptoms comments: Body aches, nasal drip. He has tried acetaminophen for the symptoms. The treatment provided mild relief.        The following portions of the patient's history were reviewed and updated as appropriate: allergies, current medications, past family history, past medical history, past social history, past surgical history and problem list.    Review of Systems   Constitutional: Negative for chills, fatigue and fever.   HENT: Positive for congestion and rhinorrhea. Negative for ear pain and sore throat.    Respiratory: Positive for cough.    Gastrointestinal: Negative for diarrhea, nausea and vomiting.   Musculoskeletal: Positive for myalgias.       Objective   Physical Exam   Constitutional: He is oriented to person, place, and time. Vital signs are normal. He appears well-developed and well-nourished. He is active. He does not appear ill. No distress.   HENT:   Head: Normocephalic and atraumatic.   Right Ear: Hearing, external ear and ear canal normal. A middle ear effusion is present.   Left Ear: Hearing, external ear and ear canal normal. A middle ear effusion is present.   Nose: Nose normal. Right sinus exhibits no maxillary sinus tenderness and no frontal sinus tenderness. Left sinus exhibits no maxillary sinus tenderness and no frontal sinus tenderness.   Mouth/Throat: Uvula is midline, oropharynx is clear and moist and mucous membranes are normal. No tonsillar exudate.   Cardiovascular: Normal rate, regular rhythm and normal heart sounds.    No murmur heard.  Pulmonary/Chest: Effort normal and breath sounds normal.   Lymphadenopathy:        Head (right side): No submental, no submandibular, no  tonsillar, no preauricular, no posterior auricular and no occipital adenopathy present.        Head (left side): No submental, no submandibular, no tonsillar, no preauricular, no posterior auricular and no occipital adenopathy present.     He has no cervical adenopathy.   Neurological: He is alert and oriented to person, place, and time.   Psychiatric: He has a normal mood and affect. His speech is normal and behavior is normal. Thought content normal.   Nursing note and vitals reviewed.        Assessment/Plan   Problems Addressed this Visit     None      Visit Diagnoses     Viral URI with cough    -  Primary        1. Viral URI with cough    Discussed appropriate conservative and symptomatic treatment with patient, including use of Tylenol for fever or pain, increase by mouth fluids, and rest. Instructed to use antihistamine to help with nasal drainage and Coricidin HBP as needed for cough or congestion. Discussed when to follow up for recheck, including worsening symptoms such as fever, purulent nasal drainage, worsening cough, productive cough, etc.

## 2018-04-05 DIAGNOSIS — I10 ESSENTIAL HYPERTENSION: Chronic | ICD-10-CM

## 2018-04-05 RX ORDER — AMLODIPINE BESYLATE 5 MG/1
TABLET ORAL
Qty: 30 TABLET | Refills: 3 | Status: SHIPPED | OUTPATIENT
Start: 2018-04-05 | End: 2018-07-30 | Stop reason: SDUPTHER

## 2018-04-05 RX ORDER — BENAZEPRIL HYDROCHLORIDE 20 MG/1
TABLET ORAL
Qty: 90 TABLET | Refills: 1 | Status: SHIPPED | OUTPATIENT
Start: 2018-04-05 | End: 2018-10-10 | Stop reason: SDUPTHER

## 2018-05-22 DIAGNOSIS — E78.2 MIXED HYPERLIPIDEMIA: Chronic | ICD-10-CM

## 2018-05-22 RX ORDER — ROSUVASTATIN CALCIUM 10 MG/1
10 TABLET, COATED ORAL NIGHTLY
Qty: 90 TABLET | Refills: 1 | Status: SHIPPED | OUTPATIENT
Start: 2018-05-22 | End: 2019-04-09 | Stop reason: SDUPTHER

## 2018-05-30 ENCOUNTER — TELEPHONE (OUTPATIENT)
Dept: INTERNAL MEDICINE | Age: 73
End: 2018-05-30

## 2018-05-30 NOTE — TELEPHONE ENCOUNTER
Pt's wife called 5/29/18 re: issues she didn't think pt would bring up at f/u appt. Pt's wife does not want to be credited w/ bringing these points up, but is concerned, and wanted Dr. Seaman aware.     Pt is s/p total right hip arthroplasty 2/27/2018. Pt's wife states pt is taking too much Tylenol for pain (bottle of 100 tablets gone in 1 month).   Pt's wife also stated that pt is experiencing pain in L hip and knee, requiring possible surgery; pt's wife wanted to know how soon after last Sx can pt have next Sx.     Pt's wife also informed me that pt has stopped taking his Amlodipine, because it keeps him up at night.     Pt's wife, again, did not want pt to know she called office, but wanted Dr. Seaman to be aware of these things, as she doesn't think pt will inform Dr. Seaman or AMAN Moctezuma.    KD

## 2018-06-08 ENCOUNTER — OFFICE VISIT (OUTPATIENT)
Dept: INTERNAL MEDICINE | Age: 73
End: 2018-06-08

## 2018-06-08 VITALS
DIASTOLIC BLOOD PRESSURE: 74 MMHG | HEIGHT: 66 IN | HEART RATE: 90 BPM | OXYGEN SATURATION: 98 % | WEIGHT: 189 LBS | TEMPERATURE: 98 F | BODY MASS INDEX: 30.37 KG/M2 | SYSTOLIC BLOOD PRESSURE: 144 MMHG

## 2018-06-08 DIAGNOSIS — E78.2 MIXED HYPERLIPIDEMIA: Chronic | ICD-10-CM

## 2018-06-08 DIAGNOSIS — Z11.59 NEED FOR HEPATITIS C SCREENING TEST: ICD-10-CM

## 2018-06-08 DIAGNOSIS — I10 ESSENTIAL HYPERTENSION: Chronic | ICD-10-CM

## 2018-06-08 DIAGNOSIS — Z12.5 ENCOUNTER FOR SCREENING FOR MALIGNANT NEOPLASM OF PROSTATE: ICD-10-CM

## 2018-06-08 DIAGNOSIS — M16.0 PRIMARY OSTEOARTHRITIS OF BOTH HIPS: Chronic | ICD-10-CM

## 2018-06-08 DIAGNOSIS — Z00.00 MEDICARE ANNUAL WELLNESS VISIT, INITIAL: Primary | ICD-10-CM

## 2018-06-08 PROCEDURE — G0438 PPPS, INITIAL VISIT: HCPCS | Performed by: INTERNAL MEDICINE

## 2018-06-08 PROCEDURE — 99213 OFFICE O/P EST LOW 20 MIN: CPT | Performed by: INTERNAL MEDICINE

## 2018-06-08 NOTE — ASSESSMENT & PLAN NOTE
Plans to have left hip replacement.   Continue Tylenol Arthritis for pain.   Weight loss advised.

## 2018-06-08 NOTE — PATIENT INSTRUCTIONS
Medicare Wellness  Personal Prevention Plan of Service     Date of Office Visit:  2018  Encounter Provider:  Eusebio Seaman MD  Place of Service:  [facility]  Patient Name: Nicolás Black  :  1945    As part of the Medicare Wellness portion of your visit today, we are providing you with this personalized preventive plan of services (PPPS). This plan is based upon recommendations of the [place] Preventive Services Task Force (USPSTF) and the Advisory Committee on Immunization Practices (ACIP).    This lists the preventive care services that should be considered, and provides dates of when you are due. Items listed as completed are up-to-date and do not require any further intervention.      Age-Appropriate Screening Schedule:  (Refer to the list below for future screening recommendations based on patient's age, sex and/or medical conditions. Orders for these recommended tests are listed in the plan section. The patient has been provided with a written plan)    Health Maintenance Topics  Health Maintenance   Topic Date Due   • ZOSTER VACCINE (2 of 2) 2015   • INFLUENZA VACCINE  2018   • LIPID PANEL  2019   • TDAP/TD VACCINES (2 - Td) 10/01/2026   • COLONOSCOPY  2027   • PNEUMOCOCCAL VACCINES (65+ LOW/MEDIUM RISK)  Completed       Health Maintenance Topics Due or Over-Due  Health Maintenance Due   Topic Date Due   • ZOSTER VACCINE (2 of 2) 2015   • HEPATITIS C SCREENING  2017   • AAA SCREEN (ONE-TIME)  2017     Recommend Shingrix and Hep A vaccines at the pharmacy.      ADDITIONAL RESOURCES:    For excellent information on senior health and wellness refer to the National [place] [employer] web-site at:    WWW .[employer]HS[facility]IORHEALTH.GOV

## 2018-06-08 NOTE — PROGRESS NOTES
"06/08/2018    MEDICARE ANNUAL WELLNESS VISIT    Nicolás Black is a 72 y.o. male who presents for INITIAL AWV & F/U ON CHRONIC MEDICAL PROBLEMS     Patient's general assessment of his health since a year ago:     - Compared to one year ago, he feels his physical health is about the same without significant change.    - Compared to one year ago, he feels his mental health is about the same without significant change.      HPI for other active medical problems:     Underwent right THR without complications. Considering having left hip replacement.     Chronic essential hypertension:  Since prior visit: compliant with medication(s), denies significant problems with medication(s) and SBP < 130.  Most recent in-office blood pressure readings:   BP Readings from Last 3 Encounters:   06/08/18 144/74   03/26/18 148/82   02/08/18 130/70      Chronic hyperlipidemia:  Current therapy include rosuvastatin, denies problems with medication.    Most recent labs:   Lab Results   Component Value Date     (H) 02/08/2018    HDL 45 02/08/2018    TRIG 280 (H) 02/08/2018    CHOLHDLRATIO 4.49 02/08/2018     Obesity: Weight has been increasing since last visit.  Weight loss efforts: no specific plan(s).  Current Body mass index is 30.52 kg/m².   Wt Readings from Last 3 Encounters:   06/08/18 85.7 kg (189 lb)   03/26/18 83.2 kg (183 lb 6.4 oz)   02/08/18 81.6 kg (180 lb)         * The required components of Health Risk Assessment (HRA) that were completed by the patient and/or my staff are contained within this note and in the scanned documents titled \"Health Risk Assessment\" within the media section of the patient's chart in [facility].       HISTORY    Recent Hospitalizations:    Recent hospitalization? : No    If YES, location, date, and diagnoses:     · Location:   · Date:   · Principle Discharge Dx:   · Secondary Dx:       Patient Care Team:    Patient Care Team:  Eusebio Seaman MD as PCP - General (Internal Medicine)  Teodoro PACHECO" MD Sushil as Consulting Physician (Orthopedic Surgery)      Allergies:  Patient has no known allergies.    Medications:  Outpatient Medications Prior to Visit   Medication Sig Dispense Refill   • amLODIPine (NORVASC) 5 MG tablet TAKE ONE TABLET BY MOUTH ONCE DAILY 30 tablet 3   • benazepril (LOTENSIN) 20 MG tablet TAKE ONE TABLET BY MOUTH ONCE DAILY 90 tablet 1   • Multiple Vitamins-Minerals (MULTIVITAMIN ADULT PO) Take 1 tablet by mouth Daily.     • NON FORMULARY Tumeric     • pantoprazole (PROTONIX) 40 MG EC tablet Take 1 tablet by mouth Daily. 30 tablet 12   • rosuvastatin (CRESTOR) 10 MG tablet Take 1 tablet by mouth Every Night. 90 tablet 1   • Selenium 200 MCG capsule Take 200 mcg by mouth Daily.     • hydrochlorothiazide (HYDRODIURIL) 12.5 MG tablet TAKE ONE TABLET BY MOUTH ONCE DAILY 30 tablet 3   • apixaban (ELIQUIS) 2.5 MG tablet tablet Take 2.5 mg by mouth.     • vitamin E 400 UNIT capsule Take 400 Units by mouth Daily.       No facility-administered medications prior to visit.        PFSH:     The following portions of the patient's history were reviewed and updated as appropriate: Allergies / Current Medications / Past Medical History / Surgical History / Social History / Family History    Problem List:  Patient Active Problem List   Diagnosis   • Hypertension   • Mixed hyperlipidemia   • Overweight (BMI 25.0-29.9)   • Primary osteoarthritis of hip   • Gastric ulcer with hemorrhage   • H/O: GI bleed   • PUD (peptic ulcer disease)       Past Medical History:  Past Medical History:   Diagnosis Date   • Arthritis    • Broken legs    • Elevated PSA    • Hyperlipidemia    • Hypertension        Past Surgical History:  Past Surgical History:   Procedure Laterality Date   • COLONOSCOPY N/A 9/27/2017    Procedure: COLONOSCOPY into cecum and TI;  Surgeon: Braydon DHALIWAL MD;  Location: [facility] ENDOSCOPY;  Service:    • CRANIOTOMY      age of 18y ; blood clot    • ENDOSCOPY N/A 9/26/2017    Procedure:  "ESOPHAGOGASTRODUODENOSCOPY WITH BOPSIES;  Surgeon: Hunter Clayton MD;  [facility];  Service:    • ENDOSCOPY N/A 2017    Procedure: ESOPHAGOGASTRODUODENOSCOPY with biopsies;  Surgeon: Hunter Clayton MD;  Location: [facility] ENDOSCOPY;  Service:    • HIP ARTHROPLASTY Right 2018   • KNEE ARTHROPLASTY Right    • VASECTOMY         Social History:  Social History     Social History   • Marital status:      Spouse name: Surya*   • Number of children: 2     Occupational History   • 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)      Social History Main Topics   • Smoking status: Former Smoker     Years: 30.00     Types: Cigarettes     Quit date:    • Smokeless tobacco: Never Used      Comment: quit 20 years ago   • Alcohol use Yes      Comment: 1 drink per week   • Drug use: No   • Sexual activity: No     Other Topics Concern   • Not on file       Family History:  Family History   Problem Relation Age of Onset   • Lung cancer Mother         smoker;  age 64   • Anxiety disorder Mother    • Depression Mother    • Alcohol abuse Mother    • Heart attack Father 64   • Alcohol abuse Father    • Urolithiasis Other         all siblings   • Esophageal cancer Brother    • Depression Brother    • Hypertension Brother    • Hyperlipidemia Brother    • Diabetes Brother    • Hypertension Sister    • Hyperlipidemia Sister    • Colon cancer Neg Hx    • Prostate cancer Neg Hx    • Dementia Neg Hx          PATIENT ASSESSMENT    Vitals:  /74   Pulse 90   Temp 98 °F (36.7 °C)   Ht 167.6 cm (65.98\")   Wt 85.7 kg (189 lb)   SpO2 98%   BMI 30.52 kg/m²   BP Readings from Last 3 Encounters:   18 144/74   18 148/82   18 130/70     Wt Readings from Last 3 Encounters:   18 85.7 kg (189 lb)   18 83.2 kg (183 lb 6.4 oz)   18 81.6 kg (180 lb)      Body mass index is 30.52 kg/m².    Pain Score    18 1342   PainSc: 8  Comment: left hip   PainLoc: Hip         Review of " Systems:    Review of Systems  Constitutional neg  Resp neg  CV neg   MSk left hip pain    Physical Exam:    Physical Exam  Constitutional  No distress, obese   Cardiovascular Rate  normal . Rhythm: regular . Heart sounds:  normal  Pulmonary/Chest  Effort normal. Breath sounds:  normal  Psychiatric  Alert. Judgment and thought content normal. Mood normal     Reviewed Data:    Labs:   Lab Results   Component Value Date     (H) 09/27/2017    K 3.7 09/27/2017    AST 15 09/27/2017    ALT 12 09/27/2017    BUN 12 09/27/2017    CREATININE 0.88 09/27/2017    CREATININE 0.89 09/26/2017    CREATININE 1.05 09/25/2017    EGFRIFNONA 86 09/27/2017       Lab Results   Component Value Date    HGBA1C 5.40 02/08/2018       Lab Results   Component Value Date     (H) 02/08/2018    HDL 45 02/08/2018    TRIG 280 (H) 02/08/2018    CHOLHDLRATIO 4.49 02/08/2018       Lab Results   Component Value Date    TSH 4.270 (H) 02/08/2018    FREET4 1.18 02/08/2018          Lab Results   Component Value Date    WBC 7.73 09/27/2017    HGB 10.5 (L) 09/27/2017    HGB 10.7 (L) 09/26/2017    HGB 10.9 (L) 09/26/2017    HGB 10.9 (L) 09/26/2017     09/27/2017                 No results found for: PSA    Imaging:          Medical Tests:          Screening for Glaucoma:  Previous screening for glaucoma?: Yes      Hearing Loss Screen:  Finger Rub Hearing Test (right ear): passed  Finger Rub Hearing Test (left ear): passed      Urinary Incontinence Screen:  Episodes of urinary incontinence? : No      Depression Screen:  PHQ-2/PHQ-9 Depression Screening 6/8/2018   Little interest or pleasure in doing things 0   Feeling down, depressed, or hopeless 0   Total Score 0   If you checked off any problems, how difficult have these problems made it for you to do your work, take care of things at home, or get along with other people? -        PHQ-2: 0 (Not depressed)    PHQ-9:        FUNCTIONAL, FALL RISK, & COGNITIVE SCREENING (Components  below):    DATA:    Functional & Cognitive Status 6/8/2018   Do you have difficulty preparing food and eating? -   Do you have difficulty bathing yourself, getting dressed or grooming yourself? -   Do you have difficulty using the toilet? -   Do you have difficulty moving around from place to place? -   Do you have trouble with steps or getting out of a bed or a chair? -   In the past year have you fallen or experienced a near fall? -   Do you need help using the phone?  -   Are you deaf or do you have serious difficulty hearing?  -   Do you need help with transportation? No   Do you need help shopping? -   Do you need help preparing meals?  -   Do you need help with housework?  -   Do you need help with laundry? -   Do you need help taking your medications? -   Do you need help managing money? -   Do you ever drive or ride in a car without wearing a seat belt? -   Do you have difficulty concentrating, remembering or making decisions? -       Fall Risk Assessment  Fallen in past 6 months: 0--> No  Mental Status: 0--> no mental status change  Mobility: 0--> No mobility issues  Medications: 0--> No meds  Total Fall Risk Score: 2    A) Assessment of Functional Ability:  (Assessment of ability to perform ADL's (showering/bathing, using toilet, dressing, feeding self, moving self around) and IADL's (use telephone, shop, prepare food, housekeep, do laundry, transport independently, take medications independently, and handle finances)    Degree of functional impairment: MILD (based on assessment noted above)      B) Assessment of Fall Risk:     - Fall within the last year? : No   - Feel unsteady when standing or walking? : No   - Worry about falling? : Yes    Need for further evaluation of gait, strength, and balance? : Yes    Timed Up and Go (TUG):   (>= 12 seconds indicates high risk for falling)    Observable abnormalities included: limps       C. Assessment of Cognitive Function:    Mini-Cog Test:     1) Registration  (3 objects): Yes   2) Clock Draw: Passed? : Yes   3) Number of objects recalled: 3    Further evaluation required? : Yes      COUNSELING    A. Identification of Health Risk Factors:    Risk factors include: cardiovascular risk factors, increased fall risk, chronic pain and obesity      B. Age-Appropriate Screening Schedule:  (Refer to the list below for future screening recommendations based on patient's age, sex and/or medical conditions. Orders for these recommended tests are listed in the plan section. The patient has been provided with a written plan)    Health Maintenance Topics  Health Maintenance   Topic Date Due   • ZOSTER VACCINE (2 of 2) 05/13/2015   • INFLUENZA VACCINE  08/01/2018   • LIPID PANEL  02/08/2019   • TDAP/TD VACCINES (2 - Td) 10/01/2026   • COLONOSCOPY  09/27/2027   • PNEUMOCOCCAL VACCINES (65+ LOW/MEDIUM RISK)  Completed       Health Maintenance Topics Due or Over-Due  Health Maintenance Due   Topic Date Due   • ZOSTER VACCINE (2 of 2) 05/13/2015   • HEPATITIS C SCREENING  04/17/2017   • AAA SCREEN (ONE-TIME)  04/17/2017         C. Advanced Care Planning:    has an advanced directive which is on file and has a medical power of       D. Patient Self-Management and Personalized Health Advice:    He has been provided with personalized counseling/information (including brochures/handouts) about:     -- optimizing diet/nutrition plans, improving exercise / conditioning, weight management, reducing risk for cardiovascular disease (heart, stroke, vascular) and fall prevention    He has been recommended for the following preventative services which has been performed today, will be ordered today or ordered/performed on upcoming follow-up visit:     -- nutrition counseling provided, exercise counseling provided, counseling for cardiovascular disease risk reduction, fall risk assessment / plan of care completed, urinary incontinence assessment done, screening for diabetes performed  (current/reviewed labs/lab ordered), triple vessel screening recommended (including AAA screening), screening for prostate cancer (discussed and SANDRA deferred but PSA will be performed annually), vaccination for Shingrix recommended at pharmacy, vaccination for Hepatitis A recommended at pharmacy, screening for hep c ordered or will be check with next lab      E. Miscellaneous Items:    -Aspirin use counseling: Does not need ASA (and currently is not on it)    -Discussed BMI with him. The BMI is above average; BMI management plan is completed (discussed plans for weight loss)    -Reviewed use of high risk medication in the elderly: YES    -Reviewed for potential of harmful drug interactions in the elderly: YES      IV. WRAP-UP    Assessment & Plan:    1) MEDICARE ANNUAL WELLNESS VISIT    2) OTHER MEDICAL CONDITIONS ADDRESSED TODAY:              Problem List Items Addressed This Visit        Medium    Hypertension (Chronic)    Overview     Stable. Continue amlodipine and benazepril qd.          Relevant Medications    hydrochlorothiazide (HYDRODIURIL) 12.5 MG tablet    amLODIPine (NORVASC) 5 MG tablet    benazepril (LOTENSIN) 20 MG tablet    Mixed hyperlipidemia (Chronic)    Overview     Continue rosuvastatin 10 mg          Relevant Medications    rosuvastatin (CRESTOR) 10 MG tablet    Primary osteoarthritis of hip (Chronic)    Overview     2/2018: Right THR         Current Assessment & Plan     Plans to have left hip replacement.   Continue Tylenol Arthritis for pain.   Weight loss advised.            Other Visit Diagnoses     Medicare annual wellness visit, initial    -  Primary    Encounter for screening for malignant neoplasm of prostate        Relevant Orders    PSA Screen    Need for hepatitis C screening test        Relevant Orders    Hepatitis C Antibody                    Orders Placed This Encounter   Procedures   • PSA Screen   • Hepatitis C Antibody       Discussion / Summary:        Medications as of  TODAY:              Current Outpatient Prescriptions   Medication Sig Dispense Refill   • amLODIPine (NORVASC) 5 MG tablet TAKE ONE TABLET BY MOUTH ONCE DAILY 30 tablet 3   • benazepril (LOTENSIN) 20 MG tablet TAKE ONE TABLET BY MOUTH ONCE DAILY 90 tablet 1   • Glucosamine-Chondroitin (GLUCOSAMINE CHONDR COMPLEX PO) Take  by mouth.     • Multiple Vitamins-Minerals (MULTIVITAMIN ADULT PO) Take 1 tablet by mouth Daily.     • NON FORMULARY Tumeric     • pantoprazole (PROTONIX) 40 MG EC tablet Take 1 tablet by mouth Daily. 30 tablet 12   • rosuvastatin (CRESTOR) 10 MG tablet Take 1 tablet by mouth Every Night. 90 tablet 1   • Selenium 200 MCG capsule Take 200 mcg by mouth Daily.     • hydrochlorothiazide (HYDRODIURIL) 12.5 MG tablet TAKE ONE TABLET BY MOUTH ONCE DAILY 30 tablet 3     No current facility-administered medications for this visit.          FOLLOW-UP:            Return in about 6 months (around 12/8/2018) for Hypertension & hyperlipidemia, COME FASTING FOR LABS.              Future Appointments  Date Time Provider Department Center   12/12/2018 10:00 AM Eusebio Seaman MD [facility] None         ______________________________________________________________________      A printed After Visit Summary (AVS) including the Personalized Prevention  Plan Services (PPPS) was given to the patient at check-out today.         **Dragon Disclaimer:   Much of this encounter note is an electronic transcription/translation of spoken language to printed text. The electronic translation of spoken language may permit erroneous, or at times, nonsensical words or phrases to be inadvertently transcribed. Although I have reviewed the note for such errors, some may still exist.

## 2018-06-09 LAB
HCV AB S/CO SERPL IA: <0.1 S/CO RATIO (ref 0–0.9)
PSA SERPL-MCNC: 2.33 NG/ML (ref 0–4)

## 2018-07-05 ENCOUNTER — OFFICE VISIT (OUTPATIENT)
Dept: INTERNAL MEDICINE | Age: 73
End: 2018-07-05

## 2018-07-05 ENCOUNTER — TELEPHONE (OUTPATIENT)
Dept: INTERNAL MEDICINE | Age: 73
End: 2018-07-05

## 2018-07-05 VITALS
WEIGHT: 187.8 LBS | DIASTOLIC BLOOD PRESSURE: 82 MMHG | TEMPERATURE: 97.8 F | SYSTOLIC BLOOD PRESSURE: 148 MMHG | HEART RATE: 79 BPM | OXYGEN SATURATION: 97 % | HEIGHT: 66 IN | BODY MASS INDEX: 30.18 KG/M2

## 2018-07-05 DIAGNOSIS — L23.9 ALLERGIC CONTACT DERMATITIS, UNSPECIFIED TRIGGER: Primary | ICD-10-CM

## 2018-07-05 PROBLEM — M16.12 ARTHRITIS OF LEFT HIP: Status: ACTIVE | Noted: 2018-06-25

## 2018-07-05 PROCEDURE — 99213 OFFICE O/P EST LOW 20 MIN: CPT | Performed by: NURSE PRACTITIONER

## 2018-07-05 RX ORDER — SENNOSIDES 8.6 MG
650 CAPSULE ORAL EVERY 8 HOURS PRN
COMMUNITY

## 2018-07-05 RX ORDER — BETAMETHASONE DIPROPIONATE 0.5 MG/G
CREAM TOPICAL 2 TIMES DAILY
Qty: 45 G | Refills: 1 | Status: SHIPPED | OUTPATIENT
Start: 2018-07-05 | End: 2018-07-15

## 2018-07-05 NOTE — TELEPHONE ENCOUNTER
Yes he needs to be seen if today schedule with ilda otherwise we have a couple apts this week he needs all pre op studies in chart before apt per BISHOP Hawk

## 2018-07-05 NOTE — PROGRESS NOTES
Subjective   Nicolás Black is a 72 y.o. male.     Rash   This is a new problem. The current episode started 1 to 4 weeks ago (started at left ankle, now on both lower arms and chest x 4 days ). The problem has been gradually worsening since onset. The affected locations include the right arm, left arm and chest. The rash is characterized by burning, itchiness and redness. It is unknown if there was an exposure to a precipitant. Pertinent negatives include no anorexia, congestion, diarrhea, eye pain, facial edema, fatigue, joint pain, nail changes, rhinorrhea, shortness of breath or sore throat. Treatments tried: OTC Hydrocortisone 1%, non-diluted bleach     He is scheduled for hip replacement surgery on 7/23/18, having pre-operative appointment with Dr. Seaman on 7/16/18.     The following portions of the patient's history were reviewed and updated as appropriate: allergies, current medications, past family history, past medical history, past social history, past surgical history and problem list.    Review of Systems   Constitutional: Negative for fatigue.   HENT: Negative for congestion, rhinorrhea and sore throat.    Eyes: Negative for pain.   Respiratory: Negative for shortness of breath.    Gastrointestinal: Negative for anorexia and diarrhea.   Musculoskeletal: Negative for joint pain.   Skin: Positive for rash. Negative for nail changes.       Objective   Physical Exam   Constitutional: Vital signs are normal. He appears well-developed and well-nourished. He does not appear ill. No distress.   Skin: Rash noted. Rash is papular (large patch of maculopapular erythemic rash to anterior chest and neck. Also has small patches to bilateral forearms. ).        Psychiatric: He has a normal mood and affect. His speech is normal and behavior is normal. Thought content normal.   Nursing note and vitals reviewed.        Assessment/Plan   Problems Addressed this Visit     None      Visit Diagnoses     Allergic contact  dermatitis, unspecified trigger    -  Primary    Relevant Medications    mupirocin (BACTROBAN) 2 % ointment    betamethasone dipropionate (DIPROLENE) 0.05 % cream        1. Allergic contact dermatitis, unspecified trigger  Appears to have localized contact dermatitis, likely worsened by use of bleach to area. Discussed that due to patient's upcoming surgery, he is not a candidate for PO steroids due to risk of immunosuppression. Will use topical steroid for no more than 10 days of treatment, can also use Benedryl PO for itching, no more bleach use. Patient has pre-operative visit scheduled with Dr. Seaman on 7/16, but discussed can follow up sooner if no improvement after 4-5 days of treatment.     - betamethasone dipropionate (DIPROLENE) 0.05 % cream; Apply  topically 2 (Two) Times a Day for 10 days.  Dispense: 45 g; Refill: 1

## 2018-07-05 NOTE — TELEPHONE ENCOUNTER
Pt's wife called stating he has poison ivy on his chest, neck, both arms and ankles. Pt is having hip surgery on 07-23-18 and went for his preadmission this week and they told the pt he needed to get this taken care of prior to his appt.  Also pt has a surgery clearance letter that needs Dr Seaman to sign. She stated he was recently seen and asking if he still needs a surgery clearance appt?  Call Mrs. Black @ [phone]  Thanks SP

## 2018-07-05 NOTE — PATIENT INSTRUCTIONS
Contact Dermatitis      Dermatitis is redness, soreness, and swelling (inflammation) of the skin. Contact dermatitis is a reaction to certain substances that touch the skin. There are two types of contact dermatitis:  · Irritant contact dermatitis. This type is caused by something that irritates your skin, such as dry hands from washing them too much. This type does not require previous exposure to the substance for a reaction to occur. This type is more common.  · Allergic contact dermatitis. This type is caused by a substance that you are allergic to, such as a nickel allergy or poison ivy. This type only occurs if you have been exposed to the substance (allergen) before. Upon a repeat exposure, your body reacts to the substance. This type is less common.    What are the causes?  Many different substances can cause contact dermatitis. Irritant contact dermatitis is most commonly caused by exposure to:  · Makeup.  · Soaps.  · Detergents.  · Bleaches.  · Acids.  · Metal salts, such as nickel.    Allergic contact dermatitis is most commonly caused by exposure to:  · Poisonous plants.  · Chemicals.  · Jewelry.  · Latex.  · Medicines.  · Preservatives in products, such as clothing.    What increases the risk?  This condition is more likely to develop in:  · People who have jobs that expose them to irritants or allergens.  · People who have certain medical conditions, such as asthma or eczema.    What are the signs or symptoms?  Symptoms of this condition may occur anywhere on your body where the irritant has touched you or is touched by you. Symptoms include:  · Dryness or flaking.  · Redness.  · Cracks.  · Itching.  · Pain or a burning feeling.  · Blisters.  · Drainage of small amounts of blood or clear fluid from skin cracks.    With allergic contact dermatitis, there may also be swelling in areas such as the eyelids, mouth, or genitals.  How is this diagnosed?  This condition is diagnosed with a medical history  and physical exam. A patch skin test may be performed to help determine the cause. If the condition is related to your job, you may need to see an occupational medicine specialist.  How is this treated?  Treatment for this condition includes figuring out what caused the reaction and protecting your skin from further contact. Treatment may also include:  · Steroid creams or ointments. Oral steroid medicines may be needed in more severe cases.  · Antibiotics or antibacterial ointments, if a skin infection is present.  · Antihistamine lotion or an antihistamine taken by mouth to ease itching.  · A bandage (dressing).    Follow these instructions at home:  Skin Care  · Moisturize your skin as needed.  · Apply cool compresses to the affected areas.  · Try taking a bath with:  ? Epsom salts. Follow the instructions on the packaging. You can get these at your local pharmacy or grocery store.  ? Baking soda. Pour a small amount into the bath as directed by your health care provider.  ? Colloidal oatmeal. Follow the instructions on the packaging. You can get this at your local pharmacy or grocery store.  · Try applying baking soda paste to your skin. Stir water into baking soda until it reaches a paste-like consistency.  · Do not scratch your skin.  · Bathe less frequently, such as every other day.  · Bathe in lukewarm water. Avoid using hot water.  Medicines  · Take or apply over-the-counter and prescription medicines only as told by your health care provider.  · If you were prescribed an antibiotic medicine, take or apply your antibiotic as told by your health care provider. Do not stop using the antibiotic even if your condition starts to improve.  General instructions  · Keep all follow-up visits as told by your health care provider. This is important.  · Avoid the substance that caused your reaction. If you do not know what caused it, keep a journal to try to track what caused it. Write down:  ? What you eat.  ? What  cosmetic products you use.  ? What you drink.  ? What you wear in the affected area. This includes jewelry.  · If you were given a dressing, take care of it as told by your health care provider. This includes when to change and remove it.  Contact a health care provider if:  · Your condition does not improve with treatment.  · Your condition gets worse.  · You have signs of infection such as swelling, tenderness, redness, soreness, or warmth in the affected area.  · You have a fever.  · You have new symptoms.  Get help right away if:  · You have a severe headache, neck pain, or neck stiffness.  · You vomit.  · You feel very sleepy.  · You notice red streaks coming from the affected area.  · Your bone or joint underneath the affected area becomes painful after the skin has healed.  · The affected area turns darker.  · You have difficulty breathing.  This information is not intended to replace advice given to you by your health care provider. Make sure you discuss any questions you have with your health care provider.  Document Released: 12/15/2001 Document Revised: 05/25/2017 Document Reviewed: 05/04/2016  [employer] Patient Education © 2018 [email or web address]

## 2018-07-10 ENCOUNTER — TELEPHONE (OUTPATIENT)
Dept: INTERNAL MEDICINE | Age: 73
End: 2018-07-10

## 2018-07-10 NOTE — TELEPHONE ENCOUNTER
"Pt seen Sol last Thursday for poison ivy. Pt stated he was to call back today to let her know how the medication worked. Pt said, \"it worked like a charm\"! Stated there's only small red marks left where the rash was. Pt said, it worked great!  Pt's # [phone]  Thanks SP  "

## 2018-07-16 ENCOUNTER — OFFICE VISIT (OUTPATIENT)
Dept: INTERNAL MEDICINE | Age: 73
End: 2018-07-16

## 2018-07-16 VITALS
HEART RATE: 74 BPM | WEIGHT: 187 LBS | SYSTOLIC BLOOD PRESSURE: 120 MMHG | BODY MASS INDEX: 30.05 KG/M2 | DIASTOLIC BLOOD PRESSURE: 76 MMHG | TEMPERATURE: 98.1 F | HEIGHT: 66 IN | OXYGEN SATURATION: 98 %

## 2018-07-16 DIAGNOSIS — M16.12 PRIMARY OSTEOARTHRITIS OF LEFT HIP: Chronic | ICD-10-CM

## 2018-07-16 DIAGNOSIS — Z01.818 PRE-OP EXAM: Primary | ICD-10-CM

## 2018-07-16 DIAGNOSIS — I10 ESSENTIAL HYPERTENSION: Chronic | ICD-10-CM

## 2018-07-16 DIAGNOSIS — E78.2 MIXED HYPERLIPIDEMIA: Chronic | ICD-10-CM

## 2018-07-16 PROCEDURE — 99214 OFFICE O/P EST MOD 30 MIN: CPT | Performed by: INTERNAL MEDICINE

## 2018-07-16 NOTE — PROGRESS NOTES
"[facility] [place]  FLORIAN CONKLIN M.D.      Nicolás DONALDSON Wayne / 72 y.o. / male  07/16/2018      ASSESSMENT & PLAN:    1. Pre-op exam    2. Primary osteoarthritis of left hip    3. Essential hypertension    4. Mixed hyperlipidemia      No orders of the defined types were placed in this encounter.    No orders of the defined types were placed in this encounter.      Summary/Discussion:  Letter faxed to ortho     No Follow-up on file.    ____________________________________________________________________    MEDICATIONS  Current Outpatient Prescriptions   Medication Sig Dispense Refill   • acetaminophen (TYLENOL) 650 MG 8 hr tablet Take 650 mg by mouth.     • amLODIPine (NORVASC) 5 MG tablet TAKE ONE TABLET BY MOUTH ONCE DAILY 30 tablet 3   • benazepril (LOTENSIN) 20 MG tablet TAKE ONE TABLET BY MOUTH ONCE DAILY 90 tablet 1   • hydrochlorothiazide (HYDRODIURIL) 12.5 MG tablet TAKE ONE TABLET BY MOUTH ONCE DAILY 30 tablet 3   • pantoprazole (PROTONIX) 40 MG EC tablet Take 1 tablet by mouth Daily. 30 tablet 12   • rosuvastatin (CRESTOR) 10 MG tablet Take 1 tablet by mouth Every Night. 90 tablet 1   • Selenium 200 MCG capsule Take 200 mcg by mouth Daily.     • Glucosamine-Chondroitin (GLUCOSAMINE CHONDR COMPLEX PO) Take  by mouth.     • Multiple Vitamins-Minerals (MULTIVITAMIN ADULT PO) Take 1 tablet by mouth Daily.     • NON FORMULARY Tumeric       No current facility-administered medications for this visit.          VITALS:    Visit Vitals  /76 (BP Location: Left arm)   Pulse 74   Temp 98.1 °F (36.7 °C)   Ht 167.6 cm (65.98\")   Wt 84.8 kg (187 lb)   SpO2 98%   BMI 30.20 kg/m²       BP Readings from Last 3 Encounters:   07/16/18 120/76   07/05/18 148/82   06/08/18 144/74     Wt Readings from Last 3 Encounters:   07/16/18 84.8 kg (187 lb)   07/05/18 85.2 kg (187 lb 12.8 oz)   06/08/18 85.7 kg (189 lb)      Body mass index is 30.2 kg/m².    CC:  Main reason(s) for today's visit: Surgical Clearance / Hip surgery (all " in care everywhere)      HPI:     Here for medical clearance for planned left THR. Underwent right THR 2/2018 without problems. Home blood pressure is well controlled. Denies chest pain, palpitations, FLORES. No prior heart or lung problems. Hyperlipidemia is well controlled on rosuvastatin. Prior smoking history without COPD/asthma.     Patient Care Team:  Eusebio Seaman MD as PCP - General (Internal Medicine)  Teodoro Ling MD as Consulting Physician (Orthopedic Surgery)    ____________________________________________________________________    REVIEW OF SYSTEMS    Review of Systems  Constitutional neg  Resp neg  CV neg  GI neg  OA hip pain  Neuro neg  Psych neg      PHYSICAL EXAMINATION    Physical Exam  Constitutional  No distress, obese   Cardiovascular Rate  normal . Rhythm: regular . Heart sounds:  Normal. No lower extremity edema.   Pulmonary/Chest  Effort normal. Breath sounds:  Normal  Psychiatric  Alert. Judgment and thought content normal. Mood normal    REVIEWED DATA:    Labs:     Cr 0.9, glucose 146 (NF), K 3.8  WBC 8.7, Hgb 14.8, plts 205        Lab Results   Component Value Date     (H) 02/08/2018    HDL 45 02/08/2018    TRIG 280 (H) 02/08/2018    CHOLHDLRATIO 4.49 02/08/2018       Lab Results   Component Value Date    TSH 4.270 (H) 02/08/2018    FREET4 1.18 02/08/2018       Lab Results   Component Value Date    WBC 7.73 09/27/2017    HGB 10.5 (L) 09/27/2017    HGB 10.7 (L) 09/26/2017    HGB 10.9 (L) 09/26/2017    HGB 10.9 (L) 09/26/2017     09/27/2017       Imaging:   EXAMINATION: XR PELVIS ONE OR TWO VIEWS    DATE: 02/27/2018    COMPARISON: February 27, 2018    HISTORY: Status post hip replacement.    FINDINGS: Status post right total hip arthroplasty. The acetabular and femoral components are intact. No acute hardware complication. Postoperative subcutaneous emphysema seen laterally. There is severe left joint osteoarthritis. Mild degenerative change   of the SI joints and pubic  symphysis. Nonspecific curvilinear radiopaque structure projected at the lower lumbar level possibly external to patient.    IMPRESSION: Status post right hip total arthroplasty without acute complication.    Dictated by: Ryne Jacome M.D.    Images and Report reviewed and interpreted by: Ryne Jacome M.D.    <PS><Electronically signed by: Ryne Jacome M.D.>  02/27/2018 1037      Medical Tests:     DATE OF EXAM: 07/02/2018 16:51  EXAMINATION(S): ECG 12-LEAD    FINAL REPORT    Procedure: ELECTROCARDIOGRAM RESULT  Heart Rate 77  P-R Interval 158 ms  QRS Interval 100 ms  QT Interval 386 ms  QTC Interval 437 ms  P Axis 42 deg  QRS Axis -19 deg  T Wave Axis 60 deg  DATE: 07/02/2018 16:51  SINUS RHYTHM  LEFT AXIS DEVIATION  Summary: Otherwise Normal ECG  Compared with:10/3/2017 5:37 PM  No significant change    Summary of old records / correspondence / consultant report:         Request outside records:         ALLERGIES  No Known Allergies     PFSH:     The following portions of the patient's history were reviewed and updated as appropriate: Allergies / Current Medications / Past Medical History / Surgical History / Social History / Family History    PROBLEM LIST   Patient Active Problem List   Diagnosis   • Hypertension   • Mixed hyperlipidemia   • Overweight (BMI 25.0-29.9)   • Primary osteoarthritis of hip   • Gastric ulcer with hemorrhage   • H/O: GI bleed   • PUD (peptic ulcer disease)   • Arthritis of left hip       PAST MEDICAL HISTORY  Past Medical History:   Diagnosis Date   • Arthritis    • Broken legs    • Elevated PSA    • Hyperlipidemia    • Hypertension        SURGICAL HISTORY  Past Surgical History:   Procedure Laterality Date   • COLONOSCOPY N/A 9/27/2017    Procedure: COLONOSCOPY into cecum and TI;  Surgeon: Braydon DHALIWAL MD;  Location: [facility] ENDOSCOPY;  Service:    • CRANIOTOMY      age of 18y ; blood clot    • ENDOSCOPY N/A 9/26/2017    Procedure: ESOPHAGOGASTRODUODENOSCOPY WITH BOPSIES;   Surgeon: Hunter Clayton MD;  Location: [facility] ENDOSCOPY;  Service:    • ENDOSCOPY N/A 2017    Procedure: ESOPHAGOGASTRODUODENOSCOPY with biopsies;  Surgeon: Hunter Clayton MD;  Location: [facility] ENDOSCOPY;  Service:    • HIP ARTHROPLASTY Right 2018   • KNEE ARTHROPLASTY Right    • VASECTOMY         SOCIAL HISTORY  Social History     Social History   • Marital status:      Spouse name: Surya*   • Number of children: 2     Occupational History   • 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)      Social History Main Topics   • Smoking status: Former Smoker     Years: 30.00     Types: Cigarettes     Quit date:    • Smokeless tobacco: Never Used      Comment: quit 20 years ago   • Alcohol use Yes      Comment: 1 drink per week   • Drug use: No   • Sexual activity: No     Other Topics Concern   • Not on file       FAMILY HISTORY  Family History   Problem Relation Age of Onset   • Lung cancer Mother         smoker;  age 64   • Anxiety disorder Mother    • Depression Mother    • Alcohol abuse Mother    • Heart attack Father 64   • Alcohol abuse Father    • Urolithiasis Other         all siblings   • Esophageal cancer Brother    • Depression Brother    • Hypertension Brother    • Hyperlipidemia Brother    • Diabetes Brother    • Hypertension Sister    • Hyperlipidemia Sister    • Colon cancer Neg Hx    • Prostate cancer Neg Hx    • Dementia Neg Hx          **Dragon Disclaimer:   Much of this encounter note is an electronic transcription/translation of spoken language to printed text. The electronic translation of spoken language may permit erroneous, or at times, nonsensical words or phrases to be inadvertently transcribed. Although I have reviewed the note for such errors, some may still exist.

## 2018-07-30 DIAGNOSIS — I10 ESSENTIAL HYPERTENSION: Chronic | ICD-10-CM

## 2018-07-30 RX ORDER — AMLODIPINE BESYLATE 5 MG/1
5 TABLET ORAL DAILY
Qty: 90 TABLET | Refills: 1 | Status: SHIPPED | OUTPATIENT
Start: 2018-07-30 | End: 2019-02-03 | Stop reason: SDUPTHER

## 2018-10-10 DIAGNOSIS — I10 ESSENTIAL HYPERTENSION: Chronic | ICD-10-CM

## 2018-10-10 RX ORDER — BENAZEPRIL HYDROCHLORIDE 20 MG/1
20 TABLET ORAL DAILY
Qty: 90 TABLET | Refills: 3 | Status: SHIPPED | OUTPATIENT
Start: 2018-10-10 | End: 2018-12-12 | Stop reason: SDUPTHER

## 2018-12-12 ENCOUNTER — OFFICE VISIT (OUTPATIENT)
Dept: INTERNAL MEDICINE | Age: 73
End: 2018-12-12

## 2018-12-12 VITALS
WEIGHT: 195 LBS | HEART RATE: 66 BPM | HEIGHT: 66 IN | SYSTOLIC BLOOD PRESSURE: 148 MMHG | BODY MASS INDEX: 31.34 KG/M2 | OXYGEN SATURATION: 96 % | TEMPERATURE: 98 F | DIASTOLIC BLOOD PRESSURE: 76 MMHG

## 2018-12-12 DIAGNOSIS — E78.2 MIXED HYPERLIPIDEMIA: Chronic | ICD-10-CM

## 2018-12-12 DIAGNOSIS — E66.9 OBESITY (BMI 30-39.9): Chronic | ICD-10-CM

## 2018-12-12 DIAGNOSIS — I10 ESSENTIAL HYPERTENSION: Primary | Chronic | ICD-10-CM

## 2018-12-12 PROBLEM — K25.4 GASTRIC ULCER WITH HEMORRHAGE: Status: RESOLVED | Noted: 2017-09-26 | Resolved: 2018-12-12

## 2018-12-12 PROBLEM — M16.10 PRIMARY OSTEOARTHRITIS OF HIP: Chronic | Status: RESOLVED | Noted: 2017-06-19 | Resolved: 2018-12-12

## 2018-12-12 PROCEDURE — 99214 OFFICE O/P EST MOD 30 MIN: CPT | Performed by: INTERNAL MEDICINE

## 2018-12-12 RX ORDER — BENAZEPRIL HYDROCHLORIDE 40 MG/1
40 TABLET, FILM COATED ORAL DAILY
Qty: 90 TABLET | Refills: 1 | Status: SHIPPED | OUTPATIENT
Start: 2018-12-12 | End: 2019-07-02 | Stop reason: SDUPTHER

## 2018-12-12 NOTE — ASSESSMENT & PLAN NOTE
Maintain a low sugar/starch/carbohydrate diet and exercise regularly. Wt loss advised.    Now s/p bilateral THR. He should be able to maintain better physical activity.

## 2018-12-12 NOTE — ASSESSMENT & PLAN NOTE
Uncontrolled.  Increase benazepril to 40 mg qd. Continue amlodipine 5 mg qd.   He had stopped HCTZ previously on his own (due to frequent urination).  MyChart BP link for 4 weeks.

## 2018-12-12 NOTE — PROGRESS NOTES
[facility] [place]  FLORIAN CONKLIN M.D.      Nicolás DONALDSON Wayne / 73 y.o. / male  12/12/2018      ASSESSMENT & PLAN:    Problem List Items Addressed This Visit        High    Hypertension - Primary (Chronic)    Current Assessment & Plan     Uncontrolled.  Increase benazepril to 40 mg qd. Continue amlodipine 5 mg qd.   He had stopped HCTZ previously on his own (due to frequent urination).  [facility] BP link for 4 weeks.           Relevant Medications    amLODIPine (NORVASC) 5 MG tablet    benazepril (LOTENSIN) 40 MG tablet    Other Relevant Orders    Comprehensive Metabolic Panel    [facility]       Medium    Mixed hyperlipidemia (Chronic)    Overview     Continue rosuvastatin 10 mg          Relevant Medications    rosuvastatin (CRESTOR) 10 MG tablet    Other Relevant Orders    Lipid Panel With / Chol / HDL Ratio       Low    Obesity (BMI 30-39.9) (Chronic)    Current Assessment & Plan     Maintain a low sugar/starch/carbohydrate diet and exercise regularly. Wt loss advised.    Now s/p bilateral THR. He should be able to maintain better physical activity.              Orders Placed This Encounter   Procedures   • [facility] BP Flowsheet   • Comprehensive Metabolic Panel   • Lipid Panel With / Chol / HDL Ratio     New Medications Ordered This Visit   Medications   • benazepril (LOTENSIN) 40 MG tablet     Sig: Take 1 tablet by mouth Daily.     Dispense:  90 tablet     Refill:  1       Summary/Discussion:  ·     Return in about 4 months (around 4/12/2019) for Hypertension.    ____________________________________________________________________    MEDICATIONS  Current Outpatient Medications on File Prior to Visit   Medication Sig   • acetaminophen (TYLENOL) 650 MG 8 hr tablet Take 650 mg by mouth.   • amLODIPine (NORVASC) 5 MG tablet Take 1 tablet by mouth Daily.   • benazepril (LOTENSIN) 20 MG tablet Take 1 tablet by mouth Daily.   • Glucosamine-Chondroitin (GLUCOSAMINE CHONDR COMPLEX PO) Take  by mouth.   • Multiple  "Vitamins-Minerals (MULTIVITAMIN ADULT PO) Take 1 tablet by mouth Daily.   • pantoprazole (PROTONIX) 40 MG EC tablet Take 1 tablet by mouth Daily.   • rosuvastatin (CRESTOR) 10 MG tablet Take 1 tablet by mouth Every Night.   • Selenium 200 MCG capsule Take 200 mcg by mouth Daily.         VITALS:    Visit Vitals  /76 (BP Location: Left arm, Patient Position: Sitting, Cuff Size: Adult)   Pulse 66   Temp 98 °F (36.7 °C)   Ht 167.6 cm (65.98\")   Wt 88.5 kg (195 lb)   SpO2 96%   BMI 31.49 kg/m²       BP Readings from Last 3 Encounters:   12/12/18 148/76   07/16/18 120/76   07/05/18 148/82     Wt Readings from Last 3 Encounters:   12/12/18 88.5 kg (195 lb)   07/16/18 84.8 kg (187 lb)   07/05/18 85.2 kg (187 lb 12.8 oz)      Body mass index is 31.49 kg/m².    CC:  Main reason(s) for today's visit: Hypertension and Hyperlipidemia      HPI:     S/p left THR 7/2018 at [facility], prior right THR. Able to get around better.     Obesity: Weight has been increasing since last visit, has not been watching diet as well.  Weight loss efforts: no specific plan(s).  Current Body mass index is 31.49 kg/m².   Wt Readings from Last 3 Encounters:   12/12/18 88.5 kg (195 lb)   07/16/18 84.8 kg (187 lb)   07/05/18 85.2 kg (187 lb 12.8 oz)      Chronic essential hypertension:  He reports to stopping HCTZ on his own sometime earlier this year due to frequent urination. Since prior visit: checks blood pressure occasionally, denies significant problems with medication(s) and SBP > 140.  Most recent in-office blood pressure readings:   BP Readings from Last 3 Encounters:   12/12/18 148/76   07/16/18 120/76   07/05/18 148/82     Chronic hyperlipidemia:  Current therapy include rosuvastatin, denies problems with medication.    Most recent labs:   Lab Results   Component Value Date     (H) 02/08/2018    HDL 45 02/08/2018    TRIG 280 (H) 02/08/2018    CHOLHDLRATIO 4.49 02/08/2018        Patient Care Team:  Eusebio Seaman MD as PCP - General " (Internal Medicine)  Eusebio Seaman MD as PCP - Claims Attributed  Teodoro Ling MD as Consulting Physician (Orthopedic Surgery)    ____________________________________________________________________    REVIEW OF SYSTEMS    Review of Systems  Constitutional neg x weight gain  Resp neg  CV neg for edema  GI no recurrent GI bleed   neg x frequent urination  Neuro neg for headaches       PHYSICAL EXAMINATION    Physical Exam  Constitutional  No distress, obese/wt gain.   Cardiovascular Rate  normal . Rhythm: regular . Heart sounds:  Normal. No peripheral edema.   Pulmonary/Chest  Effort normal. Breath sounds:  Normal  Psychiatric  Alert. Judgment and thought content normal. Mood normal    REVIEWED DATA:    Labs:     Lab Results   Component Value Date     (H) 09/27/2017    K 3.7 09/27/2017    AST 15 09/27/2017    ALT 12 09/27/2017    BUN 12 09/27/2017    CREATININE 0.88 09/27/2017    CREATININE 0.89 09/26/2017    CREATININE 1.05 09/25/2017    EGFRIFNONA 86 09/27/2017       Lab Results   Component Value Date    HGBA1C 5.40 02/08/2018    GLUCOSE 95 09/27/2017    GLUCOSE 95 09/26/2017    GLUCOSE 103 (H) 09/25/2017       Lab Results   Component Value Date     (H) 02/08/2018    HDL 45 02/08/2018    TRIG 280 (H) 02/08/2018    CHOLHDLRATIO 4.49 02/08/2018       Lab Results   Component Value Date    TSH 4.270 (H) 02/08/2018    FREET4 1.18 02/08/2018       Lab Results   Component Value Date    WBC 7.73 09/27/2017    HGB 10.5 (L) 09/27/2017    HGB 10.7 (L) 09/26/2017    HGB 10.9 (L) 09/26/2017    HGB 10.9 (L) 09/26/2017     09/27/2017       No results found for: PROTEIN, GLUCOSEU, BLOODU, NITRITEU, LEUKOCYTESUR    Imaging:         Medical Tests:         Summary of old records / correspondence / consultant report:         Request outside records:         ALLERGIES  No Known Allergies     PFSH:     The following portions of the patient's history were reviewed and updated as appropriate: Allergies /  Current Medications / Past Medical History / Surgical History / Social History / Family History    PROBLEM LIST   Patient Active Problem List   Diagnosis   • Hypertension   • Mixed hyperlipidemia   • Obesity (BMI 30-39.9)       PAST MEDICAL HISTORY  Past Medical History:   Diagnosis Date   • Arthritis    • Broken legs    • Elevated PSA    • Hyperlipidemia    • Hypertension        SURGICAL HISTORY  Past Surgical History:   Procedure Laterality Date   • CRANIOTOMY      age of 18y ; blood clot    • HIP ARTHROPLASTY Right 2018   • TOTAL HIP ARTHROPLASTY Left 2018   • VASECTOMY         SOCIAL HISTORY  Social History     Socioeconomic History   • Marital status:      Spouse name: Surya*   • Number of children: 2   • Years of education: Not on file   • Highest education level: Not on file   Occupational History   • Occupation: 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)   Tobacco Use   • Smoking status: Former Smoker     Years: 30.     Types: Cigarettes     Last attempt to quit:      Years since quittin.9   • Smokeless tobacco: Never Used   • Tobacco comment: quit 20 years ago   Substance and Sexual Activity   • Alcohol use: Yes     Comment: 1 drink per week   • Drug use: No   • Sexual activity: No       FAMILY HISTORY  Family History   Problem Relation Age of Onset   • Lung cancer Mother         smoker;  age 64   • Anxiety disorder Mother    • Depression Mother    • Alcohol abuse Mother    • Heart attack Father 64   • Alcohol abuse Father    • Urolithiasis Other         all siblings   • Esophageal cancer Brother    • Depression Brother    • Hypertension Brother    • Hyperlipidemia Brother    • Diabetes Brother    • Hypertension Sister    • Hyperlipidemia Sister    • Colon cancer Neg Hx    • Prostate cancer Neg Hx    • Dementia Neg Hx          **Dragon Disclaimer:   Much of this encounter note is an electronic transcription/translation of spoken language to printed text. The electronic translation of  spoken language may permit erroneous, or at times, nonsensical words or phrases to be inadvertently transcribed. Although I have reviewed the note for such errors, some may still exist.

## 2019-01-29 LAB
ALBUMIN SERPL-MCNC: 4.5 G/DL (ref 3.5–5.2)
ALBUMIN/GLOB SERPL: 1.7 G/DL
ALP SERPL-CCNC: 74 U/L (ref 39–117)
ALT SERPL-CCNC: 42 U/L (ref 1–41)
AST SERPL-CCNC: 28 U/L (ref 1–40)
BILIRUB SERPL-MCNC: 0.5 MG/DL (ref 0.1–1.2)
BUN SERPL-MCNC: 16 MG/DL (ref 8–23)
BUN/CREAT SERPL: 15.1 (ref 7–25)
CALCIUM SERPL-MCNC: 9.7 MG/DL (ref 8.6–10.5)
CHLORIDE SERPL-SCNC: 104 MMOL/L (ref 98–107)
CHOLEST SERPL-MCNC: 179 MG/DL (ref 0–200)
CHOLEST/HDLC SERPL: 4.37 {RATIO}
CO2 SERPL-SCNC: 25 MMOL/L (ref 22–29)
CREAT SERPL-MCNC: 1.06 MG/DL (ref 0.76–1.27)
GLOBULIN SER CALC-MCNC: 2.6 GM/DL
GLUCOSE SERPL-MCNC: 117 MG/DL (ref 65–99)
HDLC SERPL-MCNC: 41 MG/DL (ref 40–60)
LDLC SERPL CALC-MCNC: 101 MG/DL (ref 0–100)
POTASSIUM SERPL-SCNC: 4.5 MMOL/L (ref 3.5–5.2)
PROT SERPL-MCNC: 7.1 G/DL (ref 6–8.5)
SODIUM SERPL-SCNC: 142 MMOL/L (ref 136–145)
TRIGL SERPL-MCNC: 185 MG/DL (ref 0–150)
VLDLC SERPL CALC-MCNC: 37 MG/DL (ref 5–40)

## 2019-02-03 DIAGNOSIS — I10 ESSENTIAL HYPERTENSION: Chronic | ICD-10-CM

## 2019-02-04 RX ORDER — AMLODIPINE BESYLATE 5 MG/1
TABLET ORAL
Qty: 90 TABLET | Refills: 0 | Status: SHIPPED | OUTPATIENT
Start: 2019-02-04 | End: 2019-05-11 | Stop reason: SDUPTHER

## 2019-02-04 RX ORDER — PANTOPRAZOLE SODIUM 40 MG/1
40 TABLET, DELAYED RELEASE ORAL DAILY
Qty: 30 TABLET | Refills: 0 | Status: SHIPPED | OUTPATIENT
Start: 2019-02-04 | End: 2019-03-14 | Stop reason: SDUPTHER

## 2019-03-14 RX ORDER — PANTOPRAZOLE SODIUM 40 MG/1
TABLET, DELAYED RELEASE ORAL
Qty: 30 TABLET | Refills: 0 | Status: SHIPPED | OUTPATIENT
Start: 2019-03-14 | End: 2019-04-23 | Stop reason: SDUPTHER

## 2019-04-09 DIAGNOSIS — E78.2 MIXED HYPERLIPIDEMIA: Chronic | ICD-10-CM

## 2019-04-09 RX ORDER — PANTOPRAZOLE SODIUM 40 MG/1
TABLET, DELAYED RELEASE ORAL
Qty: 30 TABLET | Refills: 0 | OUTPATIENT
Start: 2019-04-09

## 2019-04-09 RX ORDER — ROSUVASTATIN CALCIUM 10 MG/1
TABLET, COATED ORAL
Qty: 90 TABLET | Refills: 0 | Status: SHIPPED | OUTPATIENT
Start: 2019-04-09 | End: 2020-07-20

## 2019-04-17 ENCOUNTER — OFFICE VISIT (OUTPATIENT)
Dept: INTERNAL MEDICINE | Age: 74
End: 2019-04-17

## 2019-04-17 VITALS
OXYGEN SATURATION: 96 % | BODY MASS INDEX: 31.02 KG/M2 | WEIGHT: 193 LBS | SYSTOLIC BLOOD PRESSURE: 140 MMHG | HEART RATE: 68 BPM | TEMPERATURE: 98.1 F | HEIGHT: 66 IN | DIASTOLIC BLOOD PRESSURE: 88 MMHG

## 2019-04-17 DIAGNOSIS — R94.6 ABNORMAL THYROID FUNCTION TEST: ICD-10-CM

## 2019-04-17 DIAGNOSIS — I10 ESSENTIAL HYPERTENSION: Primary | Chronic | ICD-10-CM

## 2019-04-17 DIAGNOSIS — E78.2 MIXED HYPERLIPIDEMIA: Chronic | ICD-10-CM

## 2019-04-17 DIAGNOSIS — E66.9 OBESITY (BMI 30-39.9): Chronic | ICD-10-CM

## 2019-04-17 LAB
T4 FREE SERPL-MCNC: 1.17 NG/DL (ref 0.93–1.7)
TSH SERPL DL<=0.005 MIU/L-ACNC: 3.23 MIU/ML (ref 0.27–4.2)

## 2019-04-17 PROCEDURE — 99214 OFFICE O/P EST MOD 30 MIN: CPT | Performed by: INTERNAL MEDICINE

## 2019-04-17 NOTE — PROGRESS NOTES
[facility] INTERNAL MEDICINE  FLORIAN CONKLIN M.D.      Nicolás DONALDSON Wayne / 73 y.o. / male  04/17/2019      ASSESSMENT & PLAN:    Problem List Items Addressed This Visit        High    Hypertension - Primary (Chronic)    Current Assessment & Plan     Marginal control here. Monitor home blood pressure readings.    Continue amlodipine 5 mg and benazepril 40 mg qd.          Relevant Medications    benazepril (LOTENSIN) 40 MG tablet    amLODIPine (NORVASC) 5 MG tablet       Medium    Mixed hyperlipidemia (Chronic)    Overview     Continue rosuvastatin 10 mg          Relevant Medications    rosuvastatin (CRESTOR) 10 MG tablet       Low    Obesity (BMI 30-39.9) (Chronic)    Current Assessment & Plan     Wt Readings from Last 3 Encounters:   04/17/19 87.5 kg (193 lb)   12/12/18 88.5 kg (195 lb)   07/16/18 84.8 kg (187 lb)      Maintain a low sugar/starch/carbohydrate diet and exercise regularly. Wt loss advised.             Other Visit Diagnoses     Abnormal thyroid function test        Relevant Orders    TSH+Free T4        Orders Placed This Encounter   Procedures   • TSH+Free T4     No orders of the defined types were placed in this encounter.      Summary/Discussion:      Return in about 6 months (around 10/17/2019) for Reassess chronic medical problems, Schedule AWV with DOROTA in 2 months, (ASA RAYA).  ____________________________________________________________________    MEDICATIONS  Current Outpatient Medications   Medication Sig Dispense Refill   • acetaminophen (TYLENOL) 650 MG 8 hr tablet Take 650 mg by mouth.     • amLODIPine (NORVASC) 5 MG tablet TAKE ONE TABLET BY MOUTH DAILY 90 tablet 0   • benazepril (LOTENSIN) 40 MG tablet Take 1 tablet by mouth Daily. 90 tablet 1   • Multiple Vitamins-Minerals (MULTIVITAMIN ADULT PO) Take 1 tablet by mouth Daily.     • pantoprazole (PROTONIX) 40 MG EC tablet TAKE ONE TABLET BY MOUTH DAILY **MUST CALL MD FOR APPOINTMENT 30 tablet 0   • rosuvastatin (CRESTOR) 10 MG tablet TAKE ONE  "TABLET BY MOUTH EVERY NIGHT 90 tablet 0   • Selenium 200 MCG capsule Take 200 mcg by mouth Daily.       No current facility-administered medications for this visit.        VITALS    Visit Vitals  /88   Pulse 68   Temp 98.1 °F (36.7 °C)   Ht 167.6 cm (65.98\")   Wt 87.5 kg (193 lb)   SpO2 96%   BMI 31.17 kg/m²       BP Readings from Last 3 Encounters:   04/17/19 140/88   12/12/18 148/76   07/16/18 120/76     Wt Readings from Last 3 Encounters:   04/17/19 87.5 kg (193 lb)   12/12/18 88.5 kg (195 lb)   07/16/18 84.8 kg (187 lb)      Body mass index is 31.17 kg/m².    CC:  Main reason(s) for today's visit: Follow-up for hypertension     HPI:     Chronic essential hypertension:  Since prior visit: compliant with medication(s), checks blood pressure occasionally, denies significant problems with medication(s) and SBP < 140.  Most recent in-office blood pressure readings:   BP Readings from Last 3 Encounters:   04/17/19 140/88   12/12/18 148/76   07/16/18 120/76     Chronic hyperlipidemia:  Current therapy include rosuvastatin, denies problems with medication.    Most recent labs:   Lab Results   Component Value Date     (H) 01/29/2019     (H) 02/08/2018    HDL 41 01/29/2019    HDL 45 02/08/2018    TRIG 185 (H) 01/29/2019    CHOLHDLRATIO 4.37 01/29/2019     Obesity: Weight has not changed significantly since last visit, has not made any significant dietary improvement, has not made significant changes to physical activity level.  Weight loss efforts: no specific plan(s).  Current Body mass index is 31.17 kg/m².   Wt Readings from Last 3 Encounters:   04/17/19 87.5 kg (193 lb)   12/12/18 88.5 kg (195 lb)   07/16/18 84.8 kg (187 lb)         Patient Care Team:  Eusebio Seaman MD as PCP - General (Internal Medicine)  Eusebio Seaman MD as PCP - [facility]  Teodoro Ling MD as Consulting Physician (Orthopedic Surgery)  ____________________________________________________________________      REVIEW " OF SYSTEMS    Review of Systems  As noted per HPI  Constitutional neg  Resp neg  CV neg       PHYSICAL EXAMINATION    Physical Exam  Constitutional  No distress  Cardiovascular Rate  normal . Rhythm: regular . Heart sounds:  normal  Pulmonary/Chest  Effort normal. Breath sounds:  normal  Psychiatric  Alert. Judgment and thought content normal. Mood normal    REVIEWED DATA:    Labs:   Lab Results   Component Value Date     01/29/2019    K 4.5 01/29/2019    AST 28 01/29/2019    ALT 42 (H) 01/29/2019    BUN 16 01/29/2019    CREATININE 1.06 01/29/2019    CREATININE 0.88 09/27/2017    CREATININE 0.89 09/26/2017    EGFRIFNONA 69 01/29/2019    EGFRIFAFRI 83 01/29/2019       Lab Results   Component Value Date    HGBA1C 5.40 02/08/2018    GLUCOSE 95 09/27/2017    GLUCOSE 95 09/26/2017    GLUCOSE 103 (H) 09/25/2017       Lab Results   Component Value Date     (H) 01/29/2019     (H) 02/08/2018    HDL 41 01/29/2019    HDL 45 02/08/2018    TRIG 185 (H) 01/29/2019    TRIG 280 (H) 02/08/2018    CHOLHDLRATIO 4.37 01/29/2019    CHOLHDLRATIO 4.49 02/08/2018       Lab Results   Component Value Date    TSH 4.270 (H) 02/08/2018    FREET4 1.18 02/08/2018          Lab Results   Component Value Date    WBC 7.73 09/27/2017    HGB 10.5 (L) 09/27/2017    HGB 10.7 (L) 09/26/2017    HGB 10.9 (L) 09/26/2017    HGB 10.9 (L) 09/26/2017     09/27/2017       No results found for: PROTEIN, GLUCOSEU, BLOODU, NITRITEU, LEUKOCYTESUR    Imaging:        Medical Tests:        Summary of old records / correspondence / consultant report:        Request outside records:        ALLERGIES  No Known Allergies     PFSH:     The following portions of the patient's history were reviewed and updated as appropriate: Allergies / Current Medications / Past Medical History / Surgical History / Social History / Family History    PROBLEM LIST   Patient Active Problem List   Diagnosis   • Hypertension   • Mixed hyperlipidemia   • Obesity (BMI  30-39.9)       PAST MEDICAL HISTORY  Past Medical History:   Diagnosis Date   • Arthritis    • Broken legs    • Elevated PSA    • Hyperlipidemia    • Hypertension        SURGICAL HISTORY  Past Surgical History:   Procedure Laterality Date   • COLONOSCOPY N/A 2017    Procedure: COLONOSCOPY into cecum and TI;  Surgeon: Braydon DHALIWAL MD;  Location: [facility] ENDOSCOPY;  Service:    • CRANIOTOMY      age of 18y ; blood clot    • ENDOSCOPY N/A 2017    Procedure: ESOPHAGOGASTRODUODENOSCOPY WITH BOPSIES;  Surgeon: Hunter Clayton MD;  Location: [facility] ENDOSCOPY;  Service:    • ENDOSCOPY N/A 2017    Procedure: ESOPHAGOGASTRODUODENOSCOPY with biopsies;  Surgeon: Hunter Clayton MD;  Location: 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)   Tobacco Use   • Smoking status: Former Smoker     Years: 30.00     Types: Cigarettes     Last attempt to quit:      Years since quittin.3   • Smokeless tobacco: Never Used   • Tobacco comment: quit 20 years ago   Substance and Sexual Activity   • Alcohol use: Yes     Comment: 1 drink per week   • Drug use: No   • Sexual activity: No       FAMILY HISTORY  Family History   Problem Relation Age of Onset   • Lung cancer Mother         smoker;  age 64   • Anxiety disorder Mother    • Depression Mother    • Alcohol abuse Mother    • Heart attack Father 64   • Alcohol abuse Father    • Urolithiasis Other         all siblings   • Esophageal cancer Brother    • Depression Brother    • Hypertension Brother    • Hyperlipidemia Brother    • Diabetes Brother    • Hypertension Sister    • Hyperlipidemia Sister     • Colon cancer Neg Hx    • Prostate cancer Neg Hx    • Dementia Neg Hx          **Dragon Disclaimer:   Much of this encounter note is an electronic transcription/translation of spoken language to printed text. The electronic translation of spoken language may permit erroneous, or at times, nonsensical words or phrases to be inadvertently transcribed. Although I have reviewed the note for such errors, some may still exist.       Template created by Panda Seaman MD

## 2019-04-17 NOTE — ASSESSMENT & PLAN NOTE
Wt Readings from Last 3 Encounters:   04/17/19 87.5 kg (193 lb)   12/12/18 88.5 kg (195 lb)   07/16/18 84.8 kg (187 lb)      Maintain a low sugar/starch/carbohydrate diet and exercise regularly. Wt loss advised.

## 2019-04-17 NOTE — ASSESSMENT & PLAN NOTE
Marginal control here. Monitor home blood pressure readings.    Continue amlodipine 5 mg and benazepril 40 mg qd.

## 2019-04-23 ENCOUNTER — OFFICE VISIT (OUTPATIENT)
Dept: GASTROENTEROLOGY | Facility: CLINIC | Age: 74
End: 2019-04-23

## 2019-04-23 VITALS
HEIGHT: 66 IN | BODY MASS INDEX: 31.08 KG/M2 | WEIGHT: 193.4 LBS | SYSTOLIC BLOOD PRESSURE: 156 MMHG | TEMPERATURE: 97.8 F | DIASTOLIC BLOOD PRESSURE: 80 MMHG

## 2019-04-23 DIAGNOSIS — K22.70 BARRETT'S ESOPHAGUS WITHOUT DYSPLASIA: ICD-10-CM

## 2019-04-23 DIAGNOSIS — K21.9 GASTROESOPHAGEAL REFLUX DISEASE, ESOPHAGITIS PRESENCE NOT SPECIFIED: Primary | ICD-10-CM

## 2019-04-23 PROCEDURE — 99213 OFFICE O/P EST LOW 20 MIN: CPT | Performed by: NURSE PRACTITIONER

## 2019-04-23 RX ORDER — PANTOPRAZOLE SODIUM 40 MG/1
40 TABLET, DELAYED RELEASE ORAL DAILY
Qty: 90 TABLET | Refills: 5 | Status: SHIPPED | OUTPATIENT
Start: 2019-04-23 | End: 2020-05-01 | Stop reason: SDUPTHER

## 2019-04-23 NOTE — PROGRESS NOTES
Chief Complaint   Patient presents with   • Follow-up   • Heartburn   • Med Refill     Pantoprazole     HPI    Nicolás Black is a  73 y.o. male here for a follow up visit for         this is an established patient of Dr. Clayton's, new to me.  He was last seen in 2017 at which time EGD was performed for history of Mosqueda's esophagus.  EGD revealed esophageal mucosal changes secondary to established long segment Mosqueda's disease, normal stomach, normal duodenum, small hiatal hernia.  Pathology was consistent with Mosqueda's no dysplasia.  Recommendations for repeat EGD in 3 years.    On visit today patient is doing quite well.  He is on daily PPI therapy with pantoprazole 40 mg taken once daily.  Needs refill.  Denies nausea, vomiting, dysphagia, poor appetite.  As long as he takes his medicine he will not have symptoms of GERD.  Avoids NSAIDs due to history of peptic ulcer disease.    Bowels are moving daily with soft stools.  No diarrhea, constipation, rectal bleeding.  He is due for colon cancer screening in 2022.      Past Medical History:   Diagnosis Date   • Arthritis    • Broken legs    • Elevated PSA    • Hyperlipidemia    • Hypertension        Past Surgical History:   Procedure Laterality Date   • COLONOSCOPY N/A 9/27/2017    Procedure: COLONOSCOPY into cecum and TI;  Surgeon: Braydon DHALIWAL MD;  Location: [facility] ENDOSCOPY;  Service:    • CRANIOTOMY      age of 18y ; blood clot    • ENDOSCOPY N/A 9/26/2017    Procedure: ESOPHAGOGASTRODUODENOSCOPY WITH BOPSIES;  Surgeon: Hunter Clayton MD;  Location: [facility] ENDOSCOPY;  Service:    • ENDOSCOPY N/A 12/21/2017    Procedure: ESOPHAGOGASTRODUODENOSCOPY with biopsies;  Surgeon: Hunter Clayton MD;  Location: [facility] ENDOSCOPY;  Service:    • HIP ARTHROPLASTY Right 02/27/2018   • TOTAL HIP ARTHROPLASTY Left 07/23/2018   • VASECTOMY         Scheduled Meds:  Outpatient Encounter Medications as of 4/23/2019   Medication Sig Dispense Refill   • acetaminophen  (TYLENOL) 650 MG 8 hr tablet Take 650 mg by mouth.     • amLODIPine (NORVASC) 5 MG tablet TAKE ONE TABLET BY MOUTH DAILY 90 tablet 0   • benazepril (LOTENSIN) 40 MG tablet Take 1 tablet by mouth Daily. 90 tablet 1   • Multiple Vitamins-Minerals (MULTIVITAMIN ADULT PO) Take 1 tablet by mouth Daily.     • pantoprazole (PROTONIX) 40 MG EC tablet Take 1 tablet by mouth Daily. 90 tablet 5   • rosuvastatin (CRESTOR) 10 MG tablet TAKE ONE TABLET BY MOUTH EVERY NIGHT 90 tablet 0   • Selenium 200 MCG capsule Take 200 mcg by mouth Daily.     • [DISCONTINUED] pantoprazole (PROTONIX) 40 MG EC tablet TAKE ONE TABLET BY MOUTH DAILY **MUST CALL MD FOR APPOINTMENT 30 tablet 0     No facility-administered encounter medications on file as of 2019.        Continuous Infusions:  No current facility-administered medications for this visit.     PRN Meds:.    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)   Tobacco Use   • Smoking status: Former Smoker     Years: 30.00     Types: Cigarettes     Last attempt to quit:      Years since quittin.3   • Smokeless tobacco: Never Used   • Tobacco comment: quit 20 years ago   Substance and Sexual Activity   • Alcohol use: Yes     Comment: 1 drink per week   • Drug use: No   • Sexual activity: No       Family History   Problem Relation Age of Onset   • Lung cancer Mother         smoker;  age 64   • Anxiety disorder Mother    • Depression Mother    • Alcohol abuse Mother    • Heart attack Father 64   • Alcohol abuse Father    • Urolithiasis Other         all siblings   • Esophageal cancer Brother    • Depression Brother    • Hypertension Brother    • Hyperlipidemia Brother    • Diabetes Brother    • Hypertension Sister    • Hyperlipidemia Sister     • Colon cancer Neg Hx    • Prostate cancer Neg Hx    • Dementia Neg Hx        Review of Systems   Constitutional: Negative for activity change, appetite change, fatigue, fever and unexpected weight change.   HENT: Negative for trouble swallowing.    Respiratory: Negative for apnea, cough, choking, chest tightness, shortness of breath and wheezing.    Cardiovascular: Negative for chest pain, palpitations and leg swelling.   Gastrointestinal: Negative for abdominal distention, abdominal pain, anal bleeding, blood in stool, constipation, diarrhea, nausea, rectal pain and vomiting.       Vitals:    04/23/19 0850   BP: 156/80   Temp: 97.8 °F (36.6 °C)       Physical Exam   Constitutional: He is oriented to person, place, and time. He appears well-developed and well-nourished.   Eyes: Pupils are equal, round, and reactive to light.   Cardiovascular: Normal rate, regular rhythm and normal heart sounds.   Pulmonary/Chest: Effort normal and breath sounds normal. No respiratory distress. He has no wheezes.   Abdominal: Soft. Bowel sounds are normal. He exhibits no distension and no mass. There is no tenderness. There is no guarding. No hernia.   Musculoskeletal: Normal range of motion.   Neurological: He is alert and oriented to person, place, and time.   Skin: Skin is warm and dry. Capillary refill takes less than 2 seconds.   Psychiatric: He has a normal mood and affect. His behavior is normal.       No images are attached to the encounter.    Nicolás was seen today for follow-up, heartburn and med refill.    Diagnoses and all orders for this visit:    Gastroesophageal reflux disease, esophagitis presence not specified    Mosqueda's esophagus without dysplasia    Other orders  -     pantoprazole (PROTONIX) 40 MG EC tablet; Take 1 tablet by mouth Daily.      Assessment    #1 GERD, currently stable on PPI therapy.  #2 Mosqueda's esophagus without dysplasia, due for surveillance EGD in 2020.    Plan     Continue PPI, refill  provided.  EGD next year.  Colonoscopy in 2022 for screening purposes.

## 2019-05-11 DIAGNOSIS — I10 ESSENTIAL HYPERTENSION: Chronic | ICD-10-CM

## 2019-05-13 RX ORDER — AMLODIPINE BESYLATE 5 MG/1
TABLET ORAL
Qty: 90 TABLET | Refills: 3 | Status: SHIPPED | OUTPATIENT
Start: 2019-05-13 | End: 2020-03-10 | Stop reason: SDUPTHER

## 2019-07-02 DIAGNOSIS — I10 ESSENTIAL HYPERTENSION: Chronic | ICD-10-CM

## 2019-07-02 RX ORDER — BENAZEPRIL HYDROCHLORIDE 40 MG/1
TABLET, FILM COATED ORAL
Qty: 90 TABLET | Refills: 0 | Status: SHIPPED | OUTPATIENT
Start: 2019-07-02 | End: 2019-10-04 | Stop reason: SDUPTHER

## 2019-09-12 ENCOUNTER — HOSPITAL ENCOUNTER (OUTPATIENT)
Dept: CARDIOLOGY | Facility: HOSPITAL | Age: 74
Discharge: HOME | End: 2019-09-12
Admitting: INTERNAL MEDICINE

## 2019-09-12 VITALS
BODY MASS INDEX: 32.44 KG/M2 | HEIGHT: 64 IN | OXYGEN SATURATION: 95 % | SYSTOLIC BLOOD PRESSURE: 155 MMHG | WEIGHT: 190 LBS | DIASTOLIC BLOOD PRESSURE: 93 MMHG | HEART RATE: 66 BPM

## 2019-09-12 DIAGNOSIS — R42 DIZZINESS: ICD-10-CM

## 2019-09-12 DIAGNOSIS — R06.02 SHORTNESS OF BREATH: ICD-10-CM

## 2019-09-12 LAB
ALBUMIN SERPL-MCNC: 4.7 G/DL (ref 3.5–5.2)
ALBUMIN/GLOB SERPL: 1.8 G/DL
ALP SERPL-CCNC: 79 U/L (ref 39–117)
ALT SERPL W P-5'-P-CCNC: 60 U/L (ref 1–41)
ANION GAP SERPL CALCULATED.3IONS-SCNC: 12.4 MMOL/L (ref 5–15)
AST SERPL-CCNC: 30 U/L (ref 1–40)
BASOPHILS # BLD AUTO: 0.08 10*3/MM3 (ref 0–0.2)
BASOPHILS NFR BLD AUTO: 0.9 % (ref 0–1.5)
BILIRUB SERPL-MCNC: 0.3 MG/DL (ref 0.2–1.2)
BUN BLD-MCNC: 16 MG/DL (ref 8–23)
BUN/CREAT SERPL: 16.2 (ref 7–25)
CALCIUM SPEC-SCNC: 9.7 MG/DL (ref 8.6–10.5)
CHLORIDE SERPL-SCNC: 106 MMOL/L (ref 98–107)
CO2 SERPL-SCNC: 24.6 MMOL/L (ref 22–29)
CREAT BLD-MCNC: 0.99 MG/DL (ref 0.76–1.27)
D DIMER PPP FEU-MCNC: <0.27 MCGFEU/ML (ref 0–0.49)
DEPRECATED RDW RBC AUTO: 43.6 FL (ref 37–54)
EOSINOPHIL # BLD AUTO: 0.22 10*3/MM3 (ref 0–0.4)
EOSINOPHIL NFR BLD AUTO: 2.4 % (ref 0.3–6.2)
ERYTHROCYTE [DISTWIDTH] IN BLOOD BY AUTOMATED COUNT: 13.4 % (ref 12.3–15.4)
GFR SERPL CREATININE-BSD FRML MDRD: 75 ML/MIN/1.73
GLOBULIN UR ELPH-MCNC: 2.6 GM/DL
GLUCOSE BLD-MCNC: 77 MG/DL (ref 65–99)
HCT VFR BLD AUTO: 51.8 % (ref 37.5–51)
HGB BLD-MCNC: 17.7 G/DL (ref 13–17.7)
IMM GRANULOCYTES # BLD AUTO: 0.04 10*3/MM3 (ref 0–0.05)
IMM GRANULOCYTES NFR BLD AUTO: 0.4 % (ref 0–0.5)
LYMPHOCYTES # BLD AUTO: 1.31 10*3/MM3 (ref 0.7–3.1)
LYMPHOCYTES NFR BLD AUTO: 14.5 % (ref 19.6–45.3)
MCH RBC QN AUTO: 30.4 PG (ref 26.6–33)
MCHC RBC AUTO-ENTMCNC: 34.2 G/DL (ref 31.5–35.7)
MCV RBC AUTO: 88.9 FL (ref 79–97)
MONOCYTES # BLD AUTO: 0.82 10*3/MM3 (ref 0.1–0.9)
MONOCYTES NFR BLD AUTO: 9.1 % (ref 5–12)
NEUTROPHILS # BLD AUTO: 6.59 10*3/MM3 (ref 1.7–7)
NEUTROPHILS NFR BLD AUTO: 72.7 % (ref 42.7–76)
NRBC BLD AUTO-RTO: 0 /100 WBC (ref 0–0.2)
NT-PROBNP SERPL-MCNC: 23.4 PG/ML (ref 5–900)
PLATELET # BLD AUTO: 227 10*3/MM3 (ref 140–450)
PMV BLD AUTO: 9.5 FL (ref 6–12)
POTASSIUM BLD-SCNC: 4.1 MMOL/L (ref 3.5–5.2)
PROT SERPL-MCNC: 7.3 G/DL (ref 6–8.5)
RBC # BLD AUTO: 5.83 10*6/MM3 (ref 4.14–5.8)
SODIUM BLD-SCNC: 143 MMOL/L (ref 136–145)
TROPONIN T SERPL-MCNC: <0.01 NG/ML (ref 0–0.03)
WBC NRBC COR # BLD: 9.06 10*3/MM3 (ref 3.4–10.8)

## 2019-09-12 PROCEDURE — 85025 COMPLETE CBC W/AUTO DIFF WBC: CPT | Performed by: INTERNAL MEDICINE

## 2019-09-12 PROCEDURE — 94760 N-INVAS EAR/PLS OXIMETRY 1: CPT

## 2019-09-12 PROCEDURE — 80053 COMPREHEN METABOLIC PANEL: CPT | Performed by: INTERNAL MEDICINE

## 2019-09-12 PROCEDURE — 99203 OFFICE O/P NEW LOW 30 MIN: CPT | Performed by: INTERNAL MEDICINE

## 2019-09-12 PROCEDURE — 84484 ASSAY OF TROPONIN QUANT: CPT | Performed by: INTERNAL MEDICINE

## 2019-09-12 PROCEDURE — 83880 ASSAY OF NATRIURETIC PEPTIDE: CPT | Performed by: INTERNAL MEDICINE

## 2019-09-12 PROCEDURE — 85379 FIBRIN DEGRADATION QUANT: CPT | Performed by: INTERNAL MEDICINE

## 2019-09-12 PROCEDURE — 36415 COLL VENOUS BLD VENIPUNCTURE: CPT

## 2019-09-12 RX ORDER — NITROGLYCERIN 0.4 MG/1
0.4 TABLET SUBLINGUAL
Status: DISCONTINUED | OUTPATIENT
Start: 2019-09-12 | End: 2020-06-09

## 2019-09-12 RX ORDER — SODIUM CHLORIDE 0.9 % (FLUSH) 0.9 %
10 SYRINGE (ML) INJECTION AS NEEDED
Status: DISCONTINUED | OUTPATIENT
Start: 2019-09-12 | End: 2020-06-09

## 2019-09-12 NOTE — PROGRESS NOTES
Date of Office Visit: 2019  Encounter Provider: Hope Sahu MD  Place of Service: [facility] CARDIOLOGY  Patient Name: Nicolás Black  :1945      Patient ID:  Nicolás Black is a 73 y.o. male is here for dizziness          History of Present Illness    This is a 73-year-old gentleman with a history of hypertension, hyperlipidemia, obesity.  He was at [facility] with his wife who is getting chemotherapy.  They were waiting.  He was seated.  He said he had a sudden onset of disorientation and slight dizziness accompanied with diaphoresis.  He had had a reasonable breakfast and snack just prior to this episode.  He had no chest pain, heart racing or skipping.  He did not feel like he was in a pass out.  He did not feel like he was falling over.  The room was felt like it was slightly spinning.  He had no short windedness.  He has had no recent swelling of the legs or abdominal distention.  He has had no recent travel.  He did not have localized weakness of arms, legs.  He had no difficulty swallowing or speaking.    At this time he feels well.  He has no history of myocardial infarction, heart failure or stroke.    ECG shows normal sinus rhythm with incomplete right bundle branch block.  Troponin, proBNP, CMP, d-dimer, CBC normal.    Orthostatics: Blood pressure lying 155/93, sitting 158/90, standing 159/82.    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 of 18   • Elevated PSA    • Hyperlipidemia    • Hypertension          Past Surgical History:   Procedure Laterality Date   • COLONOSCOPY N/A 2017    Procedure: COLONOSCOPY into cecum and TI;  Surgeon: Braydon DHALIWAL MD;  Location: [facility] ENDOSCOPY;  Service:    • CRANIOTOMY      age of 18y ; blood clot    • ENDOSCOPY N/A 2017    Procedure: ESOPHAGOGASTRODUODENOSCOPY WITH  BOPSIES;  Surgeon: Hunter Clayton MD;  Location: [facility] ENDOSCOPY;  Service:    • ENDOSCOPY N/A 2017    Procedure: ESOPHAGOGASTRODUODENOSCOPY with biopsies;  Surgeon: Hunter Clayton MD;  Location: [facility] ENDOSCOPY;  Service:    • HIP ARTHROPLASTY Right 2018   • TOTAL HIP ARTHROPLASTY Left 2018   • VASECTOMY         Current Outpatient Medications on File Prior to Encounter   Medication Sig Dispense Refill   • amLODIPine (NORVASC) 5 MG tablet TAKE ONE TABLET BY MOUTH DAILY 90 tablet 3   • benazepril (LOTENSIN) 40 MG tablet TAKE ONE TABLET BY MOUTH DAILY 90 tablet 0   • Multiple Vitamins-Minerals (MULTIVITAMIN ADULT PO) Take 1 tablet by mouth Daily.     • pantoprazole (PROTONIX) 40 MG EC tablet Take 1 tablet by mouth Daily. 90 tablet 5   • rosuvastatin (CRESTOR) 10 MG tablet TAKE ONE TABLET BY MOUTH EVERY NIGHT 90 tablet 0   • Selenium 200 MCG capsule Take 200 mcg by mouth Daily.     • acetaminophen (TYLENOL) 650 MG 8 hr tablet Take 650 mg by mouth.       No current facility-administered medications on file prior to encounter.        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   HENT: Negative for congestion.    Eyes: Negative for vision loss in left eye and vision loss in right eye.   Respiratory: Negative.  Negative for cough, hemoptysis, shortness of breath, sleep disturbances due to  "breathing, snoring, sputum production and wheezing.    Endocrine: Negative.    Hematologic/Lymphatic: Negative.    Skin: Negative for poor wound healing and rash.   Musculoskeletal: Negative for falls, gout, muscle cramps and myalgias.   Gastrointestinal: Negative for abdominal pain, diarrhea, dysphagia, hematemesis, melena, nausea and vomiting.   Neurological: Negative for excessive daytime sleepiness, dizziness, headaches, light-headedness, loss of balance, seizures and vertigo.   Psychiatric/Behavioral: Negative for depression and substance abuse. The patient is not nervous/anxious.        Procedures  Procedures        Objective:      Vitals:    09/12/19 1409   BP: 158/87   BP Location: Right arm   Patient Position: Sitting   Pulse: 66   SpO2: 95%   Weight: 86.2 kg (190 lb)   Height: 162.6 cm (64\")     Body mass index is 32.61 kg/m².    Physical Exam   Constitutional: He is oriented to person, place, and time. He appears well-developed and well-nourished. No distress.   HENT:   Head: Normocephalic and atraumatic.   Eyes: Conjunctivae are normal. No scleral icterus.   Neck: Neck supple. No JVD present. Carotid bruit is not present. No thyromegaly present.   Cardiovascular: Normal rate, regular rhythm, S1 normal, S2 normal, normal heart sounds and intact distal pulses.  No extrasystoles are present. PMI is not displaced. Exam reveals no gallop.   No murmur heard.  Pulses:       Carotid pulses are 2+ on the right side, and 2+ on the left side.       Radial pulses are 2+ on the right side, and 2+ on the left side.        Dorsalis pedis pulses are 2+ on the right side, and 2+ on the left side.        Posterior tibial pulses are 2+ on the right side, and 2+ on the left side.   Pulmonary/Chest: Effort normal and breath sounds normal. No respiratory distress. He has no wheezes. He has no rhonchi. He has no rales. He exhibits no tenderness.   Abdominal: Soft. Bowel sounds are normal. He exhibits no distension, no " abdominal bruit and no mass. There is no tenderness.   Musculoskeletal: He exhibits no edema or deformity.   Lymphadenopathy:     He has no cervical adenopathy.   Neurological: He is alert and oriented to person, place, and time. No cranial nerve deficit.   Skin: Skin is warm and dry. No rash noted. He is not diaphoretic. No cyanosis. No pallor. Nails show no clubbing.   Psychiatric: He has a normal mood and affect. Judgment normal.   Vitals reviewed.      Lab Review:       Assessment:      Diagnosis Plan   1. Shortness of breath  Cardiac Monitoring    Vital Signs - Once    Vital Signs - As Needed    Pulse Oximetry    Insert Peripheral IV    sodium chloride 0.9 % flush 10 mL    nitroglycerin (NITROSTAT) SL tablet 0.4 mg    NPO Diet    Bathroom Privileges With Assistance    CBC & Differential    Comprehensive Metabolic Panel    Troponin T    D-Dimer    proBNP    Cardiac Monitoring    Vital Signs - Once    Insert Peripheral IV    NPO Diet    Bathroom Privileges With Assistance    Troponin T    Troponin T    D-Dimer    D-Dimer    proBNP    proBNP    CBC Auto Differential   2. Dizziness  Cardiac Monitoring    Vital Signs - Once    Vital Signs - As Needed    Pulse Oximetry    Insert Peripheral IV    sodium chloride 0.9 % flush 10 mL    nitroglycerin (NITROSTAT) SL tablet 0.4 mg    NPO Diet    Bathroom Privileges With Assistance    CBC & Differential    Comprehensive Metabolic Panel    Troponin T    D-Dimer    proBNP    Cardiac Monitoring    Vital Signs - Once    Insert Peripheral IV    NPO Diet    Bathroom Privileges With Assistance    Troponin T    Troponin T    D-Dimer    D-Dimer    proBNP    proBNP    CBC Auto Differential     1. Dizziness.  Unremarkable ECG.  No evidence of acute coronary syndrome.   Orthostatics normal.   If these are normal, will dismiss to follow-up with his primary care physician.  Noncardiac dizziness.  2. Hypertension.  Blood pressure slightly elevated here  today.  3. Obesity  4. Hyperlipidemia       Plan:       No follow-up needed.

## 2019-09-13 ENCOUNTER — TELEPHONE (OUTPATIENT)
Dept: INTERNAL MEDICINE | Age: 74
End: 2019-09-13

## 2019-09-13 NOTE — TELEPHONE ENCOUNTER
LDVM for pt to call office to update symptoms, as well as to make a f/u visit with either Dr. Seaman or DOROTA Elmore.    KD

## 2019-09-13 NOTE — TELEPHONE ENCOUNTER
----- Message from Eusebio Semaan MD sent at 9/13/2019  7:15 AM EDT -----  Regarding: f/u on recent visit to cardiologist   Apparently was seen at [facility] and sent to cardiologist, felt to be more disorientation and not cardiac. See how he is doing today. Have him see me or Catarina sometime today if he is having ongoing problems. Otherwise, see us sometime next week to assess what may have happened.     ----- Message -----  From: Hope Sahu MD  Sent: 9/12/2019   2:53 PM  To: Eusebio Seaman MD    He was sent here for dizziness - really was more disoriented.  Definitely noncardiac - needs f/u for this.      See my note-  Thanks  Hope Sahu

## 2019-09-24 ENCOUNTER — TELEPHONE (OUTPATIENT)
Dept: INTERNAL MEDICINE | Age: 74
End: 2019-09-24

## 2019-10-03 ENCOUNTER — OFFICE VISIT (OUTPATIENT)
Dept: INTERNAL MEDICINE | Age: 74
End: 2019-10-03

## 2019-10-03 VITALS
HEIGHT: 64 IN | HEART RATE: 81 BPM | OXYGEN SATURATION: 98 % | TEMPERATURE: 98.1 F | WEIGHT: 195 LBS | BODY MASS INDEX: 33.29 KG/M2 | DIASTOLIC BLOOD PRESSURE: 78 MMHG | SYSTOLIC BLOOD PRESSURE: 146 MMHG

## 2019-10-03 DIAGNOSIS — F43.21 GRIEF REACTION: Primary | ICD-10-CM

## 2019-10-03 DIAGNOSIS — I10 ESSENTIAL HYPERTENSION: Chronic | ICD-10-CM

## 2019-10-03 DIAGNOSIS — Z23 ENCOUNTER FOR IMMUNIZATION: ICD-10-CM

## 2019-10-03 DIAGNOSIS — E78.2 MIXED HYPERLIPIDEMIA: Chronic | ICD-10-CM

## 2019-10-03 PROBLEM — F43.20 GRIEF REACTION: Status: ACTIVE | Noted: 2019-10-03

## 2019-10-03 PROCEDURE — G0008 ADMIN INFLUENZA VIRUS VAC: HCPCS | Performed by: INTERNAL MEDICINE

## 2019-10-03 PROCEDURE — 99214 OFFICE O/P EST MOD 30 MIN: CPT | Performed by: INTERNAL MEDICINE

## 2019-10-03 PROCEDURE — 90653 IIV ADJUVANT VACCINE IM: CPT | Performed by: INTERNAL MEDICINE

## 2019-10-03 NOTE — ASSESSMENT & PLAN NOTE
BP Readings from Last 3 Encounters:   10/03/19 146/78   09/12/19 155/93   09/12/19 158/91      Little high today. Monitor home blood pressure readings.

## 2019-10-03 NOTE — PATIENT INSTRUCTIONS
** IMPORTANT MESSAGE FROM DR. CONKLIN **    In our office, your satisfaction is VERY important to us.     You may receive a survey from [employer] by mail or E-mail for you to provide feedback about your visit. This information is invaluable for me to know what we can do to improve our services.     I ask that you please take a few minutes to complete the survey and let us know how we are doing in serving your needs. (You may receive the survey more than once for multiple visits)    Thank You !    Dr. Conklin & Staff    __________________________________________________________      ADDITIONAL INSTRUCTION / REMINDERS FROM DR. CONKLIN

## 2019-10-03 NOTE — PROGRESS NOTES
[facility]  FLORIAN CONKLIN M.D.      Nicolás C Wayne / 74 y.o. / male  10/03/2019      CHIEF COMPLAINT     Grief (spouse  very recently); Hypertension; and Hyperlipidemia      ASSESSMENT & PLAN     Problem List Items Addressed This Visit        High    Grief reaction - Primary    Overview     2019: Spouse Surya*  of AML         Current Assessment & Plan     Expectant grief reaction without abnormal depression/anxiety.  · Strongly advised him to avoid drinking heavily (has never had drinking problem previously).   · [facility]  · Call if ongoing or worsening difficulties.   · He expressed understanding and agreed to follow above instructions.             Medium    Hypertension (Chronic)    Overview     Continue benazepril 40 mg and amlodipine 5 mg qd.          Current Assessment & Plan     BP Readings from Last 3 Encounters:   10/03/19 146/78   19 155/93   19 158/91      Little high today. Monitor home blood pressure readings.           Relevant Medications    amLODIPine (NORVASC) 5 MG tablet    benazepril (LOTENSIN) 40 MG tablet    Mixed hyperlipidemia (Chronic)    Overview     Continue rosuvastatin 10 mg          Current Assessment & Plan     Lab Results   Component Value Date     (H) 2019     (H) 2018    HDL 41 2019    HDL 45 2018    TRIG 185 (H) 2019    CHOLHDLRATIO 4.37 2019    CHOLHDLRATIO 4.49 2018      Continue rosuvastatin 10 mg daily.          Relevant Medications    rosuvastatin (CRESTOR) 10 MG tablet      Other Visit Diagnoses     Encounter for immunization        Relevant Orders    Fluzone High Dose =>65Years (Completed)        Orders Placed This Encounter   Procedures   • Fluzone High Dose =>65Years     No orders of the defined types were placed in this encounter.      Summary/Discussion:  ·       Next Appointment with me: 2019    Return in about 2 months (around 12/3/2019) for  "Hypertension.      MEDICATIONS     Current Outpatient Medications   Medication Sig Dispense Refill   • acetaminophen (TYLENOL) 650 MG 8 hr tablet Take 650 mg by mouth.     • amLODIPine (NORVASC) 5 MG tablet TAKE ONE TABLET BY MOUTH DAILY 90 tablet 3   • benazepril (LOTENSIN) 40 MG tablet TAKE ONE TABLET BY MOUTH DAILY 90 tablet 0   • Multiple Vitamins-Minerals (MULTIVITAMIN ADULT PO) Take 1 tablet by mouth Daily.     • pantoprazole (PROTONIX) 40 MG EC tablet Take 1 tablet by mouth Daily. 90 tablet 5   • rosuvastatin (CRESTOR) 10 MG tablet TAKE ONE TABLET BY MOUTH EVERY NIGHT 90 tablet 0   • Selenium 200 MCG capsule Take 200 mcg by mouth Daily.       Current Facility-Administered Medications   Medication Dose Route Frequency Provider Last Rate Last Dose   • nitroglycerin (NITROSTAT) SL tablet 0.4 mg  0.4 mg Sublingual Q5 Min PRN Hope Sahu MD       • sodium chloride 0.9 % flush 10 mL  10 mL Intravenous PRN Hope Sahu MD              VITAL SIGNS     Visit Vitals  /78   Pulse 81   Temp 98.1 °F (36.7 °C)   Ht 162.6 cm (64\")   Wt 88.5 kg (195 lb)   SpO2 98%   BMI 33.47 kg/m²       BP Readings from Last 3 Encounters:   10/03/19 146/78   19 155/93   19 158/91     Wt Readings from Last 3 Encounters:   10/03/19 88.5 kg (195 lb)   19 86.2 kg (190 lb)   19 87.7 kg (193 lb 6.4 oz)      Body mass index is 33.47 kg/m².      HISTORY OF PRESENT ILLNESS     Reviewed chief complaint and details of complaint as documented by staff.     His sons wanted him to move up his appointment for follow-up due to recent death of his spouse (Surya*) who  recently of AML.   They buried her on Tuesday. He reports to feeling very sad/tearful at times especially in the mornings. He has good support system with his kids and extended family members. He has been drinking a \"pint\" of vodka daily and has never prior drinking issues. Denies suicidal ideation but has a gun at home. He is staying " socially engaged with family and friends. Sleeps okay. Appetite is lower.     Chronic essential hypertension:  Since prior visit: compliant with medication(s) and does not check blood pressure at home.  Most recent in-office blood pressure readings:   BP Readings from Last 3 Encounters:   10/03/19 146/78   09/12/19 155/93   09/12/19 158/91     Chronic hyperlipidemia:  Current therapy include rosuvastatin, denies problems with medication.    Most recent labs:   Lab Results   Component Value Date     (H) 01/29/2019     (H) 02/08/2018    HDL 41 01/29/2019    HDL 45 02/08/2018    TRIG 185 (H) 01/29/2019    CHOLHDLRATIO 4.37 01/29/2019    CHOLHDLRATIO 4.49 02/08/2018          Patient Care Team:  Eusebio Seaman MD as PCP - General (Internal Medicine)  Eusebio Seaman MD as PCP - [facility]  Teodoro Lign MD as Consulting Physician (Orthopedic Surgery)      REVIEW OF SYSTEMS     Review of Systems  Constitutional neg  Resp neg  CV neg  Psych grief ; neg suicidal ideation or psychosis       PHYSICAL EXAMINATION     Physical Exam  Constitutional  No distress, in grief tearful at times   Cardiovascular Rate  normal . Rhythm: regular . Heart sounds:  Normal  Pulmonary/Chest: Effort normal. Breath sounds normal.    Psychiatric  Alert. Judgment intact. Thought content normal. Mood grief. No unusual depression, psychosis and no suicidal ideation.       REVIEWED DATA     Labs:     Lab Results   Component Value Date     09/12/2019    K 4.1 09/12/2019    CALCIUM 9.7 09/12/2019    AST 30 09/12/2019    ALT 60 (H) 09/12/2019    BUN 16 09/12/2019    CREATININE 0.99 09/12/2019    CREATININE 1.06 01/29/2019    CREATININE 0.9 07/24/2018    EGFRIFNONA 75 09/12/2019    EGFRIFAFRI 83 01/29/2019       Lab Results   Component Value Date    HGBA1C 5.40 02/08/2018     (H) 01/29/2019     (H) 07/24/2018     (H) 07/02/2018       Lab Results   Component Value Date     (H) 01/29/2019      (H) 02/08/2018    HDL 41 01/29/2019    HDL 45 02/08/2018    TRIG 185 (H) 01/29/2019    TRIG 280 (H) 02/08/2018    CHOLHDLRATIO 4.37 01/29/2019    CHOLHDLRATIO 4.49 02/08/2018       Lab Results   Component Value Date    TSH 3.230 04/17/2019    FREET4 1.17 04/17/2019       Lab Results   Component Value Date    WBC 9.06 09/12/2019    HGB 17.7 09/12/2019    HGB 13.7 07/24/2018    HGB 15.8 07/02/2018     09/12/2019       Lab Results   Component Value Date    GLUCOSEU Negative 02/05/2018    BLOODU Negative 02/05/2018    NITRITEU Negative 02/05/2018    LEUKOCYTESUR Negative 02/05/2018       Imaging:         Medical Tests:         Summary of old records / correspondence / consultant report:         Request outside records:         ALLERGIES  No Known Allergies     PFSH:     The following portions of the patient's history were reviewed and updated as appropriate: Allergies / Current Medications / Past Medical History / Surgical History / Social History / Family History    PROBLEM LIST   Patient Active Problem List   Diagnosis   • Hypertension   • Mixed hyperlipidemia   • Obesity (BMI 30-39.9)   • Dizziness   • Grief reaction       PAST MEDICAL HISTORY  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 of 18   • Elevated PSA    • Hyperlipidemia    • Hypertension        SURGICAL HISTORY  Past Surgical History:   Procedure Laterality Date   • COLONOSCOPY N/A 9/27/2017    Procedure: COLONOSCOPY into cecum and TI;  Surgeon: Braydon DHALIWAL MD;  Location: [facility] ENDOSCOPY;  Service:    • CRANIOTOMY      age of 18y ; blood clot    • ENDOSCOPY N/A 9/26/2017    Procedure: ESOPHAGOGASTRODUODENOSCOPY WITH BOPSIES;  Surgeon: Hunter Clayton MD;  Location: [facility] ENDOSCOPY;  Service:    • ENDOSCOPY N/A 12/21/2017    Procedure: ESOPHAGOGASTRODUODENOSCOPY with biopsies;  Surgeon: Hunter Clayton MD;  Location:   "[facility] ENDOSCOPY;  Service:    • HIP ARTHROPLASTY Right 2018   • TOTAL HIP ARTHROPLASTY Left 2018   • VASECTOMY         SOCIAL HISTORY  Social History     Socioeconomic History   • Marital status:      Spouse name: Surya*   • Number of children: 2   • Years of education: Not on file   • Highest education level: Not on file   Occupational History   • Occupation: 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)   Tobacco Use   • Smoking status: Former Smoker     Years: 30.00     Types: Cigarettes     Last attempt to quit:      Years since quittin.7   • Smokeless tobacco: Never Used   • Tobacco comment: quit 20 years ago   Substance and Sexual Activity   • Alcohol use: Yes     Comment: Drinking about a \"pint\" of vodka since recent death of spouse   • Drug use: No   • Sexual activity: No   Social History Narrative    2019: Wife Surya*  from AML        FAMILY HISTORY  Family History   Problem Relation Age of Onset   • Lung cancer Mother         smoker;  age 64   • Anxiety disorder Mother    • Depression Mother    • Alcohol abuse Mother    • Heart attack Father 64   • Alcohol abuse Father    • Urolithiasis Other         all siblings   • Esophageal cancer Brother    • Depression Brother    • Hypertension Brother    • Hyperlipidemia Brother    • Diabetes Brother    • Hypertension Sister    • Hyperlipidemia Sister    • Colon cancer Neg Hx    • Prostate cancer Neg Hx    • Dementia Neg Hx          **Dragon Disclaimer:   Much of this encounter note is an electronic transcription/translation of spoken language to printed text. The electronic translation of spoken language may permit erroneous, or at times, nonsensical words or phrases to be inadvertently transcribed. Although I have reviewed the note for such errors, some may still exist.       Template created by Panda Seaman MD   "

## 2019-10-03 NOTE — ASSESSMENT & PLAN NOTE
Expectant grief reaction without abnormal depression/anxiety.  · Strongly advised him to avoid drinking heavily (has never had drinking problem previously).   · [facility]  · Call if ongoing or worsening difficulties.   · He expressed understanding and agreed to follow above instructions.

## 2019-10-04 DIAGNOSIS — I10 ESSENTIAL HYPERTENSION: Chronic | ICD-10-CM

## 2019-10-07 RX ORDER — BENAZEPRIL HYDROCHLORIDE 40 MG/1
TABLET, FILM COATED ORAL
Qty: 90 TABLET | Refills: 3 | Status: SHIPPED | OUTPATIENT
Start: 2019-10-07 | End: 2020-04-23 | Stop reason: SDUPTHER

## 2019-11-17 ENCOUNTER — APPOINTMENT (OUTPATIENT)
Dept: CT IMAGING | Facility: HOSPITAL | Age: 74
End: 2019-11-17

## 2019-11-17 ENCOUNTER — HOSPITAL ENCOUNTER (EMERGENCY)
Facility: HOSPITAL | Age: 74
Discharge: HOME | End: 2019-11-17
Attending: EMERGENCY MEDICINE | Admitting: EMERGENCY MEDICINE

## 2019-11-17 VITALS
HEIGHT: 64 IN | WEIGHT: 190 LBS | SYSTOLIC BLOOD PRESSURE: 150 MMHG | BODY MASS INDEX: 32.44 KG/M2 | RESPIRATION RATE: 18 BRPM | HEART RATE: 92 BPM | OXYGEN SATURATION: 96 % | TEMPERATURE: 99.8 F | DIASTOLIC BLOOD PRESSURE: 87 MMHG

## 2019-11-17 DIAGNOSIS — R19.7 NAUSEA VOMITING AND DIARRHEA: Primary | ICD-10-CM

## 2019-11-17 DIAGNOSIS — R11.2 NAUSEA VOMITING AND DIARRHEA: Primary | ICD-10-CM

## 2019-11-17 LAB
ALBUMIN SERPL-MCNC: 4.7 G/DL (ref 3.5–5.2)
ALBUMIN/GLOB SERPL: 1.7 G/DL
ALP SERPL-CCNC: 75 U/L (ref 39–117)
ALT SERPL W P-5'-P-CCNC: 39 U/L (ref 1–41)
ANION GAP SERPL CALCULATED.3IONS-SCNC: 14.4 MMOL/L (ref 5–15)
AST SERPL-CCNC: 25 U/L (ref 1–40)
BACTERIA UR QL AUTO: ABNORMAL /HPF
BASOPHILS # BLD AUTO: 0.02 10*3/MM3 (ref 0–0.2)
BASOPHILS NFR BLD AUTO: 0.2 % (ref 0–1.5)
BILIRUB SERPL-MCNC: 1 MG/DL (ref 0.2–1.2)
BILIRUB UR QL STRIP: NEGATIVE
BUN BLD-MCNC: 23 MG/DL (ref 8–23)
BUN/CREAT SERPL: 21.5 (ref 7–25)
CALCIUM SPEC-SCNC: 9.5 MG/DL (ref 8.6–10.5)
CHLORIDE SERPL-SCNC: 103 MMOL/L (ref 98–107)
CLARITY UR: CLEAR
CO2 SERPL-SCNC: 23.6 MMOL/L (ref 22–29)
COLOR UR: YELLOW
CREAT BLD-MCNC: 1.07 MG/DL (ref 0.76–1.27)
DEPRECATED RDW RBC AUTO: 46.1 FL (ref 37–54)
EOSINOPHIL # BLD AUTO: 0.01 10*3/MM3 (ref 0–0.4)
EOSINOPHIL NFR BLD AUTO: 0.1 % (ref 0.3–6.2)
ERYTHROCYTE [DISTWIDTH] IN BLOOD BY AUTOMATED COUNT: 14.3 % (ref 12.3–15.4)
GFR SERPL CREATININE-BSD FRML MDRD: 68 ML/MIN/1.73
GLOBULIN UR ELPH-MCNC: 2.8 GM/DL
GLUCOSE BLD-MCNC: 161 MG/DL (ref 65–99)
GLUCOSE UR STRIP-MCNC: NEGATIVE MG/DL
HCT VFR BLD AUTO: 49.3 % (ref 37.5–51)
HGB BLD-MCNC: 17.4 G/DL (ref 13–17.7)
HGB UR QL STRIP.AUTO: NEGATIVE
HOLD SPECIMEN: NORMAL
HOLD SPECIMEN: NORMAL
HYALINE CASTS UR QL AUTO: ABNORMAL /LPF
IMM GRANULOCYTES # BLD AUTO: 0.08 10*3/MM3 (ref 0–0.05)
IMM GRANULOCYTES NFR BLD AUTO: 0.6 % (ref 0–0.5)
KETONES UR QL STRIP: ABNORMAL
LEUKOCYTE ESTERASE UR QL STRIP.AUTO: NEGATIVE
LIPASE SERPL-CCNC: 19 U/L (ref 13–60)
LYMPHOCYTES # BLD AUTO: 0.25 10*3/MM3 (ref 0.7–3.1)
LYMPHOCYTES NFR BLD AUTO: 1.9 % (ref 19.6–45.3)
MCH RBC QN AUTO: 31.9 PG (ref 26.6–33)
MCHC RBC AUTO-ENTMCNC: 35.3 G/DL (ref 31.5–35.7)
MCV RBC AUTO: 90.5 FL (ref 79–97)
MONOCYTES # BLD AUTO: 0.52 10*3/MM3 (ref 0.1–0.9)
MONOCYTES NFR BLD AUTO: 4 % (ref 5–12)
NEUTROPHILS # BLD AUTO: 12.11 10*3/MM3 (ref 1.7–7)
NEUTROPHILS NFR BLD AUTO: 93.2 % (ref 42.7–76)
NITRITE UR QL STRIP: NEGATIVE
NRBC BLD AUTO-RTO: 0 /100 WBC (ref 0–0.2)
PH UR STRIP.AUTO: 5.5 [PH] (ref 5–8)
PLATELET # BLD AUTO: 220 10*3/MM3 (ref 140–450)
PMV BLD AUTO: 9.6 FL (ref 6–12)
POTASSIUM BLD-SCNC: 3.8 MMOL/L (ref 3.5–5.2)
PROT SERPL-MCNC: 7.5 G/DL (ref 6–8.5)
PROT UR QL STRIP: ABNORMAL
RBC # BLD AUTO: 5.45 10*6/MM3 (ref 4.14–5.8)
RBC # UR: ABNORMAL /HPF
REF LAB TEST METHOD: ABNORMAL
SODIUM BLD-SCNC: 141 MMOL/L (ref 136–145)
SP GR UR STRIP: >=1.03 (ref 1–1.03)
SQUAMOUS #/AREA URNS HPF: ABNORMAL /HPF
UROBILINOGEN UR QL STRIP: ABNORMAL
WBC NRBC COR # BLD: 12.99 10*3/MM3 (ref 3.4–10.8)
WBC UR QL AUTO: ABNORMAL /HPF
WHOLE BLOOD HOLD SPECIMEN: NORMAL
WHOLE BLOOD HOLD SPECIMEN: NORMAL

## 2019-11-17 PROCEDURE — 81001 URINALYSIS AUTO W/SCOPE: CPT | Performed by: NURSE PRACTITIONER

## 2019-11-17 PROCEDURE — 83690 ASSAY OF LIPASE: CPT | Performed by: NURSE PRACTITIONER

## 2019-11-17 PROCEDURE — 74177 CT ABD & PELVIS W/CONTRAST: CPT

## 2019-11-17 PROCEDURE — 99283 EMERGENCY DEPT VISIT LOW MDM: CPT

## 2019-11-17 PROCEDURE — 25010000002 IOPAMIDOL 61 % SOLUTION: Performed by: NURSE PRACTITIONER

## 2019-11-17 PROCEDURE — 80053 COMPREHEN METABOLIC PANEL: CPT | Performed by: NURSE PRACTITIONER

## 2019-11-17 PROCEDURE — 96374 THER/PROPH/DIAG INJ IV PUSH: CPT

## 2019-11-17 PROCEDURE — 25010000002 ONDANSETRON PER 1 MG: Performed by: NURSE PRACTITIONER

## 2019-11-17 PROCEDURE — 85025 COMPLETE CBC W/AUTO DIFF WBC: CPT | Performed by: NURSE PRACTITIONER

## 2019-11-17 RX ORDER — ONDANSETRON 2 MG/ML
4 INJECTION INTRAMUSCULAR; INTRAVENOUS ONCE
Status: COMPLETED | OUTPATIENT
Start: 2019-11-17 | End: 2019-11-17

## 2019-11-17 RX ORDER — ONDANSETRON 4 MG/1
4 TABLET, ORALLY DISINTEGRATING ORAL EVERY 8 HOURS PRN
Qty: 10 TABLET | Refills: 0 | Status: SHIPPED | OUTPATIENT
Start: 2019-11-17 | End: 2020-06-01

## 2019-11-17 RX ORDER — SODIUM CHLORIDE 0.9 % (FLUSH) 0.9 %
10 SYRINGE (ML) INJECTION AS NEEDED
Status: DISCONTINUED | OUTPATIENT
Start: 2019-11-17 | End: 2019-11-17 | Stop reason: HOSPADM

## 2019-11-17 RX ADMIN — ONDANSETRON 4 MG: 2 INJECTION INTRAMUSCULAR; INTRAVENOUS at 10:23

## 2019-11-17 RX ADMIN — IOPAMIDOL 85 ML: 612 INJECTION, SOLUTION INTRAVENOUS at 11:20

## 2019-11-17 RX ADMIN — SODIUM CHLORIDE 1000 ML: 9 INJECTION, SOLUTION INTRAVENOUS at 10:21

## 2019-11-17 NOTE — ED PROVIDER NOTES
EMERGENCY DEPARTMENT ENCOUNTER    Room Number:  23/23  Date of encounter:  11/17/2019  PCP: Eusebio Seaman MD  Historian: Patient      HPI:  Chief Complaint: Vomiting  A complete HPI/ROS/PMH/PSH/SH/FH are unobtainable due to: None  Context: Nicolás Black is a 74 y.o. male who presents to the ED c/o 14 episodes of nausea/vomiting that began around 2000 last night. He reports associated diaphoresis with the episodes, associated diarrhea and mild periumbilical abdominal pain. He denies dysuria. He denies recent ill contacts. He denies previous episodes like this in the past. He denies hx of diabetes mellitus.       MEDICAL HISTORY REVIEW  Patient was recently see in September 2019 at an urgent care center for dizziness and diaphoresis. He was immediately discharged with instructions to go to [facility] ER to be evaluated in the chest pain unit. Patient was then evaluated by Dr. Sahu, cardiology.    PAST MEDICAL HISTORY  Active Ambulatory Problems     Diagnosis Date Noted   • Hypertension 04/17/2017   • Mixed hyperlipidemia 04/17/2017   • Obesity (BMI 30-39.9) 04/17/2017   • Dizziness 09/12/2019   • Grief reaction 10/03/2019     Resolved Ambulatory Problems     Diagnosis Date Noted   • Primary osteoarthritis of hip 06/19/2017   • Gastric ulcer with hemorrhage 09/26/2017     Past Medical History:   Diagnosis Date   • Arthritis    • Asthma    • Broken legs    • Deep vein thrombosis (CMS/HCC)    • Elevated PSA    • Hyperlipidemia    • Hypertension          PAST SURGICAL HISTORY  Past Surgical History:   Procedure Laterality Date   • COLONOSCOPY N/A 9/27/2017    Procedure: COLONOSCOPY into cecum and TI;  Surgeon: Braydon DHALIWAL MD;  Location: [facility] ENDOSCOPY;  Service:    • CRANIOTOMY      age of 18y ; blood clot    • ENDOSCOPY N/A 9/26/2017    Procedure: ESOPHAGOGASTRODUODENOSCOPY WITH BOPSIES;  Surgeon: Hunter Clayton MD;  Location: [facility] ENDOSCOPY;  Service:    • ENDOSCOPY N/A 12/21/2017    Procedure:  "ESOPHAGOGASTRODUODENOSCOPY with biopsies;  Surgeon: Hunter Clayton MD;  Location: [facility] ENDOSCOPY;  Service:    • HIP ARTHROPLASTY Right 2018   • TOTAL HIP ARTHROPLASTY Left 2018   • VASECTOMY           FAMILY HISTORY  Family History   Problem Relation Age of Onset   • Lung cancer Mother         smoker;  age 64   • Anxiety disorder Mother    • Depression Mother    • Alcohol abuse Mother    • Heart attack Father 64   • Alcohol abuse Father    • Urolithiasis Other         all siblings   • Esophageal cancer Brother    • Depression Brother    • Hypertension Brother    • Hyperlipidemia Brother    • Diabetes Brother    • Hypertension Sister    • Hyperlipidemia Sister    • Colon cancer Neg Hx    • Prostate cancer Neg Hx    • Dementia Neg Hx          SOCIAL HISTORY  Social History     Socioeconomic History   • Marital status:      Spouse name: Surya*   • Number of children: 2   • Years of education: Not on file   • Highest education level: Not on file   Occupational History   • Occupation: 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)   Tobacco Use   • Smoking status: Former Smoker     Years: 30.00     Types: Cigarettes     Last attempt to quit:      Years since quittin.8   • Smokeless tobacco: Never Used   • Tobacco comment: quit 20 years ago   Substance and Sexual Activity   • Alcohol use: Yes     Comment: Drinking about a \"pint\" of vodka since recent death of spouse   • Drug use: No   • Sexual activity: No   Social History Narrative    2019: Wife Surya*  from AML          ALLERGIES  Patient has no known allergies.        REVIEW OF SYSTEMS  Review of Systems   Constitutional: Positive for diaphoresis. Negative for activity change, appetite change and fever.   HENT: Negative for congestion and sore throat.    Eyes: Negative.    Respiratory: Negative for cough and shortness of breath.    Cardiovascular: Negative for chest pain and leg swelling.   Gastrointestinal: Positive for abdominal pain, " diarrhea, nausea and vomiting.   Endocrine: Negative.    Genitourinary: Negative for decreased urine volume and dysuria.   Musculoskeletal: Negative for neck pain.   Skin: Negative for rash and wound.   Allergic/Immunologic: Negative.    Neurological: Negative for weakness, numbness and headaches.   Hematological: Negative.    Psychiatric/Behavioral: Negative.    All other systems reviewed and are negative.       All systems reviewed and negative except for those discussed in HPI.       PHYSICAL EXAM    I have reviewed the triage vital signs and nursing notes.    ED Triage Vitals [11/17/19 0935]   Temp Heart Rate Resp BP SpO2   99.8 °F (37.7 °C) 115 18 -- 96 %      Temp src Heart Rate Source Patient Position BP Location FiO2 (%)   Tympanic Monitor -- -- --       GENERAL: not distressed  HENT: nares patent  EYES: no scleral icterus  CV: regular rhythm, regular rate  RESPIRATORY: normal effort  ABDOMEN: soft; LLQ and periumbilical tenderness  MUSCULOSKELETAL: no deformity  NEURO: alert, moves all extremities, follows commands  SKIN: warm, dry        LAB RESULTS  Recent Results (from the past 24 hour(s))   Comprehensive Metabolic Panel    Collection Time: 11/17/19 10:18 AM   Result Value Ref Range    Glucose 161 (H) 65 - 99 mg/dL    BUN 23 8 - 23 mg/dL    Creatinine 1.07 0.76 - 1.27 mg/dL    Sodium 141 136 - 145 mmol/L    Potassium 3.8 3.5 - 5.2 mmol/L    Chloride 103 98 - 107 mmol/L    CO2 23.6 22.0 - 29.0 mmol/L    Calcium 9.5 8.6 - 10.5 mg/dL    Total Protein 7.5 6.0 - 8.5 g/dL    Albumin 4.70 3.50 - 5.20 g/dL    ALT (SGPT) 39 1 - 41 U/L    AST (SGOT) 25 1 - 40 U/L    Alkaline Phosphatase 75 39 - 117 U/L    Total Bilirubin 1.0 0.2 - 1.2 mg/dL    eGFR Non African Amer 68 >60 mL/min/1.73    Globulin 2.8 gm/dL    A/G Ratio 1.7 g/dL    BUN/Creatinine Ratio 21.5 7.0 - 25.0    Anion Gap 14.4 5.0 - 15.0 mmol/L   Lipase    Collection Time: 11/17/19 10:18 AM   Result Value Ref Range    Lipase 19 13 - 60 U/L   CBC Auto  Differential    Collection Time: 19 10:18 AM   Result Value Ref Range    WBC 12.99 (H) 3.40 - 10.80 10*3/mm3    RBC 5.45 4.14 - 5.80 10*6/mm3    Hemoglobin 17.4 13.0 - 17.7 g/dL    Hematocrit 49.3 37.5 - 51.0 %    MCV 90.5 79.0 - 97.0 fL    MCH 31.9 26.6 - 33.0 pg    MCHC 35.3 31.5 - 35.7 g/dL    RDW 14.3 12.3 - 15.4 %    RDW-SD 46.1 37.0 - 54.0 fl    MPV 9.6 6.0 - 12.0 fL    Platelets 220 140 - 450 10*3/mm3    Neutrophil % 93.2 (H) 42.7 - 76.0 %    Lymphocyte % 1.9 (L) 19.6 - 45.3 %    Monocyte % 4.0 (L) 5.0 - 12.0 %    Eosinophil % 0.1 (L) 0.3 - 6.2 %    Basophil % 0.2 0.0 - 1.5 %    Immature Grans % 0.6 (H) 0.0 - 0.5 %    Neutrophils, Absolute 12.11 (H) 1.70 - 7.00 10*3/mm3    Lymphocytes, Absolute 0.25 (L) 0.70 - 3.10 10*3/mm3    Monocytes, Absolute 0.52 0.10 - 0.90 10*3/mm3    Eosinophils, Absolute 0.01 0.00 - 0.40 10*3/mm3    Basophils, Absolute 0.02 0.00 - 0.20 10*3/mm3    Immature Grans, Absolute 0.08 (H) 0.00 - 0.05 10*3/mm3    nRBC 0.0 0.0 - 0.2 /100 WBC   Light Blue Top    Collection Time: 19 10:18 AM   Result Value Ref Range    Extra Tube hold for add-on    Green Top (Gel)    Collection Time: 19 10:18 AM   Result Value Ref Range    Extra Tube Hold for add-ons.    Lavender Top    Collection Time: 19 10:18 AM   Result Value Ref Range    Extra Tube hold for add-on    Gold Top - SST    Collection Time: 19 10:18 AM   Result Value Ref Range    Extra Tube Hold for add-ons.        Ordered the above labs and independently reviewed the results.        RADIOLOGY  Ct Abdomen Pelvis With Contrast    Result Date: 2019  CT SCAN OF THE ABDOMEN AND PELVIS WITH INTRAVENOUS CONTRAST  HISTORY: Low abdominal pain and vomiting and some diarrhea.  The CT scan was performed as an emergency procedure through the abdomen and pelvis with intravenous contrast and demonstrates the followin. There is some minimal scarring at the lung bases. There is a very small hiatus hernia measuring 4.1 cm.  There is mild diffuse fatty infiltration of the liver. The spleen, pancreas, both adrenal glands, and both kidneys are unremarkable. The gallbladder also appears normal. 2. There is borderline aneurysmal enlargement of the infrarenal aorta measuring 2.3 cm. There is no retroperitoneal lymphadenopathy. There is slight prominence of several small bowel loops containing fluid and measuring up to 2.2 cm in diameter. There is some generalized enhancement of the small bowel mucosa and this is nonspecific but suggests an element of enteritis. There is some scattered diverticulosis throughout the colon but no inflammatory change is seen. The appendix appears normal. 3. In the pelvis, there is a left inguinal ring hernia containing a portion of the left dome of the urinary bladder. The remainder of the pelvis is unremarkable.      Radiation dose reduction techniques were utilized, including automated exposure control and exposure modulation based on body size.         I ordered the above noted radiological studies. Reviewed by me and discussed with radiologist.  See dictation for official radiology interpretation.      PROCEDURES    Procedures      MEDICATIONS GIVEN IN ER    Medications   sodium chloride 0.9 % flush 10 mL (not administered)   sodium chloride 0.9 % bolus 1,000 mL (0 mL Intravenous Stopped 11/17/19 1145)   ondansetron (ZOFRAN) injection 4 mg (4 mg Intravenous Given 11/17/19 1023)   iopamidol (ISOVUE-300) 61 % injection 100 mL (85 mL Intravenous Given by Other 11/17/19 1120)         PROGRESS, DATA ANALYSIS, CONSULTS, AND MEDICAL DECISION MAKING      1152 Rechecked the patient who is resting comfortably and in NAD. He is no longer nauseated. Patient is stable. BP- 141/84 HR- 101 Temp- 99.8 °F (37.7 °C) (Tympanic) O2 sat- 96%. Informed the patient of all labs and imaging including negative CT of the abdomen. Will PO challenge the patient prior to disposition.    1214 Pt has tolerated drinking ginger ale without  nausea or vomiting. Pt will be discharged home with a prescription for Zofran. Pt understands and agrees with the plan, all questions answered.       All labs have been independently reviewed by me.  All radiology studies have been reviewed by me and discussed with radiologist dictating the report.   EKG's independently viewed and interpreted by me.  Discussion below represents my analysis of pertinent findings related to patient's condition, differential diagnosis, treatment plan and final disposition.           AS OF 12:18 PM VITALS:    BP - 150/87  HR - 92  TEMP - 99.8 °F (37.7 °C) (Tympanic)  O2 SATS - 96%        DIAGNOSIS  Final diagnoses:   Nausea vomiting and diarrhea         DISPOSITION  DISCHARGE    Patient discharged in stable condition.    Reviewed implications of results, diagnosis, meds, responsibility to follow up, warning signs and symptoms of possible worsening, potential complications and reasons to return to ER.    Patient/Family voiced understanding of above instructions.    Discussed plan for discharge, as there is no emergent indication for admission. Patient referred to primary care provider for BP management due to today's BP. Pt/family is agreeable and understands need for follow up and repeat testing.  Pt is aware that discharge does not mean that nothing is wrong but it indicates no emergency is present that requires admission and they must continue care with follow-up as given below or physician of their choice.     FOLLOW-UP  Eusebio Seaman MD  Formerly named [facility] [address]  [phone]    Call            Medication List      New Prescriptions    ondansetron ODT 4 MG disintegrating tablet  Commonly known as:  ZOFRAN-ODT  Take 1 tablet by mouth Every 8 (Eight) Hours As Needed for Nausea or   Vomiting.              Documentation assistance provided by marshal Cano and Deanne Seaman for DOROTA Barger.  Information recorded by the marshal was done at my direction and  has been verified and validated by me.          Ximena Cano  11/17/19 1155       Deanne Seaman  11/17/19 1218       Shana Cho APRN  11/17/19 1526

## 2019-11-17 NOTE — ED PROVIDER NOTES
The MARIA GUADALUPE and I have discussed this patient's history, physical exam, and treatment plan. I have reviewed the documentation and personally had a face to face interaction with the patient  I affirm the documentation and agree with the treatment and plan.  The following describes my personal findings.    The patient presents complaining of 14 episodes of vomiting which started last night. Pt also c/o diarrhea and subjective fever. Pt denies abd pain, CP, SOA, rash    Limited physical exam:  Patient is nontoxic appearing, awake, alert, conversant   Lungs/cardiovascular: no distress, good air movement B, non-tachycardic  Abdomen: non-distended, positive bowel sounds, nonTTpalp   Back/extremities: DP, PT pulses are intact bilaterally, no BLE edema    Discussed w/pt plan is to d/c pt with f/u to PCP in 2-3 days for recheck. Sx's are likely gastroenteritis, self limited. Pt encouraged to take probiotics for several weeks post diarrhea.  Pt understands and agrees with the plan. All questions were answered.     Documentation assistance provided by marshal Brown.  Information recorded by the marshal was done at my direction and has been verified and validated by me.         Kevin Zepeda  11/17/19 1216       Margareth Foley MD  11/20/19 [id number]

## 2019-11-17 NOTE — DISCHARGE INSTRUCTIONS
Home to rest  Drink plenty of fluids  Zofran for nausea  Good handwashing   Return if worse or new concerns   Continue care with your primary care physician and have your blood pressure regularly checked and managed. Normal blood pressure is 120/80.

## 2019-12-04 ENCOUNTER — OFFICE VISIT (OUTPATIENT)
Dept: INTERNAL MEDICINE | Age: 74
End: 2019-12-04

## 2019-12-04 VITALS
WEIGHT: 197 LBS | DIASTOLIC BLOOD PRESSURE: 80 MMHG | SYSTOLIC BLOOD PRESSURE: 150 MMHG | HEART RATE: 69 BPM | TEMPERATURE: 97 F | BODY MASS INDEX: 33.63 KG/M2 | HEIGHT: 64 IN | OXYGEN SATURATION: 96 %

## 2019-12-04 DIAGNOSIS — I10 ESSENTIAL HYPERTENSION: Primary | Chronic | ICD-10-CM

## 2019-12-04 PROCEDURE — 99213 OFFICE O/P EST LOW 20 MIN: CPT | Performed by: INTERNAL MEDICINE

## 2019-12-04 RX ORDER — HYDROCHLOROTHIAZIDE 12.5 MG/1
12.5 TABLET ORAL DAILY
Qty: 30 TABLET | Refills: 3 | Status: SHIPPED | OUTPATIENT
Start: 2019-12-04 | End: 2020-03-10

## 2019-12-04 NOTE — PROGRESS NOTES
[facility] – [place] INTERNAL MEDICINE  FLORIAN CONKLIN M.D.      Nicolás DONALDSON Wayne / 74 y.o. / male  12/04/2019      CHIEF COMPLAINT     Hypertension (2 month f/u)      ASSESSMENT & PLAN     Problem List Items Addressed This Visit        High    Hypertension - Primary (Chronic)    Current Assessment & Plan     Blood pressure is uncontrolled.   · Start HCTZ 12.5 mg qd.   · Continue amlodipine 5 mg and benazepril 40 mg qd.   · Send BP readings in 1 month.          Relevant Medications    amLODIPine (NORVASC) 5 MG tablet    benazepril (LOTENSIN) 40 MG tablet    hydroCHLOROthiazide (HYDRODIURIL) 12.5 MG tablet        No orders of the defined types were placed in this encounter.    New Medications Ordered This Visit   Medications   • hydroCHLOROthiazide (HYDRODIURIL) 12.5 MG tablet     Sig: Take 1 tablet by mouth Daily.     Dispense:  30 tablet     Refill:  3       Summary/Discussion:  ·       Next Appointment with me: Visit date not found    Return in about 3 months (around 3/4/2020) for HYPERTENSION & HYPERLIPIDEMIA, COME FASTING FOR LABS.      MEDICATIONS     Current Outpatient Medications   Medication Sig Dispense Refill   • acetaminophen (TYLENOL) 650 MG 8 hr tablet Take 650 mg by mouth.     • amLODIPine (NORVASC) 5 MG tablet TAKE ONE TABLET BY MOUTH DAILY 90 tablet 3   • benazepril (LOTENSIN) 40 MG tablet TAKE ONE TABLET BY MOUTH DAILY 90 tablet 3   • Multiple Vitamins-Minerals (MULTIVITAMIN ADULT PO) Take 1 tablet by mouth Daily.     • ondansetron ODT (ZOFRAN-ODT) 4 MG disintegrating tablet Take 1 tablet by mouth Every 8 (Eight) Hours As Needed for Nausea or Vomiting. 10 tablet 0   • pantoprazole (PROTONIX) 40 MG EC tablet Take 1 tablet by mouth Daily. 90 tablet 5   • rosuvastatin (CRESTOR) 10 MG tablet TAKE ONE TABLET BY MOUTH EVERY NIGHT 90 tablet 0   • Selenium 200 MCG capsule Take 200 mcg by mouth Daily.       Current Facility-Administered Medications   Medication Dose Route Frequency Provider Last Rate Last Dose   • nitroglycerin  "(NITROSTAT) SL tablet 0.4 mg  0.4 mg Sublingual Q5 Min PRN Hope Sahu MD       • sodium chloride 0.9 % flush 10 mL  10 mL Intravenous PRN Hope Sahu MD              VITAL SIGNS     Visit Vitals  /80   Pulse 69   Temp 97 °F (36.1 °C)   Ht 162.6 cm (64\")   Wt 89.4 kg (197 lb)   SpO2 96%   BMI 33.81 kg/m²       BP Readings from Last 3 Encounters:   12/04/19 150/80   11/17/19 150/87   10/03/19 146/78     Wt Readings from Last 3 Encounters:   12/04/19 89.4 kg (197 lb)   11/17/19 86.2 kg (190 lb)   10/03/19 88.5 kg (195 lb)      Body mass index is 33.81 kg/m².      HISTORY OF PRESENT ILLNESS     2 months follow-up for hypertension. Home blood pressure is generally > 150. Denies headaches. Compliant with amlodipine and benazepril.      Patient Care Team:  Eusebio Seaman MD as PCP - General (Internal Medicine)  Eusebio Seaman MD as PCP - Claims Attributed  Teodoro Ling MD as Consulting Physician (Orthopedic Surgery)      REVIEW OF SYSTEMS     Review of Systems  Constitutional neg  Resp neg  CV neg      PHYSICAL EXAMINATION     Physical Exam  Constitutional  No distress  Cardiovascular Rate  normal . Rhythm: regular . Heart sounds:  Normal  Psychiatric  Alert. Judgment intact. Thought content normal. Mood normal      REVIEWED DATA     Labs:     Lab Results   Component Value Date     11/17/2019    K 3.8 11/17/2019    CALCIUM 9.5 11/17/2019    AST 25 11/17/2019    ALT 39 11/17/2019    BUN 23 11/17/2019    CREATININE 1.07 11/17/2019    CREATININE 0.99 09/12/2019    CREATININE 1.06 01/29/2019    EGFRIFNONA 68 11/17/2019    EGFRIFAFRI 83 01/29/2019       Lab Results   Component Value Date    HGBA1C 5.3 02/28/2018    HGBA1C 5.40 02/08/2018     (H) 01/29/2019     (H) 07/24/2018     (H) 07/02/2018       Lab Results   Component Value Date     (H) 01/29/2019     (H) 02/08/2018    HDL 41 01/29/2019    HDL 45 02/08/2018    TRIG 185 (H) 01/29/2019    TRIG 280 (H) " 2018    CHOLHDLRATIO 4.37 2019    CHOLHDLRATIO 4.49 2018       Lab Results   Component Value Date    TSH 3.230 2019    FREET4 1.17 2019       Lab Results   Component Value Date    WBC 12.99 (H) 2019    HGB 17.4 2019    HGB 17.7 2019    HGB 13.7 2018     2019       Lab Results   Component Value Date    GLUCOSEU Negative 2019    BLOODU Negative 2019    NITRITEU Negative 2019    LEUKOCYTESUR Negative 2019       Imaging:     CT SCAN OF THE ABDOMEN AND PELVIS WITH INTRAVENOUS CONTRAST     HISTORY: Low abdominal pain and vomiting and some diarrhea.     The CT scan was performed as an emergency procedure through the abdomen  and pelvis with intravenous contrast and demonstrates the followin. There is some minimal scarring at the lung bases. There is a very  small hiatus hernia measuring 4.1 cm. There is mild diffuse fatty  infiltration of the liver. The spleen, pancreas, both adrenal glands,  and both kidneys are unremarkable. The gallbladder also appears normal.  2. There is borderline aneurysmal enlargement of the infrarenal aorta  measuring 2.3 cm. There is no retroperitoneal lymphadenopathy. There is  slight prominence of several small bowel loops containing fluid and  measuring up to 2.2 cm in diameter. There is some generalized  enhancement of the small bowel mucosa and this is nonspecific but  suggests an element of enteritis. There is some scattered diverticulosis  throughout the colon but no inflammatory change is seen. The appendix  appears normal.  3. In the pelvis, there is a left inguinal ring hernia containing a  portion of the left dome of the urinary bladder. The remainder of the  pelvis is unremarkable.     This report was finalized on 2019     Medical Tests:         Summary of old records / correspondence / consultant report:         Request outside records:         ALLERGIES  No Known Allergies     PFSH:  "    The following portions of the patient's history were reviewed and updated as appropriate: Allergies / Current Medications / Past Medical History / Surgical History / Social History / Family History    PROBLEM LIST   Patient Active Problem List   Diagnosis   • Hypertension   • Mixed hyperlipidemia   • Obesity (BMI 30-39.9)   • Dizziness   • Grief reaction       PAST MEDICAL HISTORY  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 of 18   • Elevated PSA    • Hyperlipidemia    • Hypertension        SURGICAL HISTORY  Past Surgical History:   Procedure Laterality Date   • COLONOSCOPY N/A 2017    Procedure: COLONOSCOPY into cecum and TI;  Surgeon: Braydon DHALIWAL MD;  Location: [facility] ENDOSCOPY;  Service:    • CRANIOTOMY      age of 18y ; blood clot    • ENDOSCOPY N/A 2017    Procedure: ESOPHAGOGASTRODUODENOSCOPY WITH BOPSIES;  Surgeon: Hunter Clayton MD;  Location: [facility] ENDOSCOPY;  Service:    • ENDOSCOPY N/A 2017    Procedure: ESOPHAGOGASTRODUODENOSCOPY with biopsies;  Surgeon: Hunter Clayton MD;  Location: [facility] ENDOSCOPY;  Service:    • HIP ARTHROPLASTY Right 2018   • TOTAL HIP ARTHROPLASTY Left 2018   • VASECTOMY         SOCIAL HISTORY  Social History     Socioeconomic History   • Marital status:      Spouse name: Surya*   • Number of children: 2   • Years of education: Not on file   • Highest education level: Not on file   Occupational History   • Occupation: 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)   Tobacco Use   • Smoking status: Former Smoker     Years: 30.00     Types: Cigarettes     Last attempt to quit:      Years since quittin.9   • Smokeless tobacco: Never Used   • Tobacco comment: quit 20 years ago   Substance and Sexual Activity   • Alcohol use: Yes     Comment: Drinking about a \"pint\" of vodka since recent death of spouse   • Drug use: No   • " Sexual activity: No   Social History Narrative    2019: Wife Surya*  from AML        FAMILY HISTORY  Family History   Problem Relation Age of Onset   • Lung cancer Mother         smoker;  age 64   • Anxiety disorder Mother    • Depression Mother    • Alcohol abuse Mother    • Heart attack Father 64   • Alcohol abuse Father    • Urolithiasis Other         all siblings   • Esophageal cancer Brother    • Depression Brother    • Hypertension Brother    • Hyperlipidemia Brother    • Diabetes Brother    • Hypertension Sister    • Hyperlipidemia Sister    • Colon cancer Neg Hx    • Prostate cancer Neg Hx    • Dementia Neg Hx          **Dragon Disclaimer:   Much of this encounter note is an electronic transcription/translation of spoken language to printed text. The electronic translation of spoken language may permit erroneous, or at times, nonsensical words or phrases to be inadvertently transcribed. Although I have reviewed the note for such errors, some may still exist.       Template created by Panda Seaman MD

## 2019-12-04 NOTE — ASSESSMENT & PLAN NOTE
Blood pressure is uncontrolled.   · Start HCTZ 12.5 mg qd.   · Continue amlodipine 5 mg and benazepril 40 mg qd.   · Send BP readings in 1 month.

## 2020-03-10 ENCOUNTER — OFFICE VISIT (OUTPATIENT)
Dept: INTERNAL MEDICINE | Age: 75
End: 2020-03-10

## 2020-03-10 VITALS
HEIGHT: 64 IN | TEMPERATURE: 97.9 F | OXYGEN SATURATION: 96 % | SYSTOLIC BLOOD PRESSURE: 166 MMHG | WEIGHT: 194 LBS | BODY MASS INDEX: 33.12 KG/M2 | HEART RATE: 74 BPM | DIASTOLIC BLOOD PRESSURE: 90 MMHG

## 2020-03-10 DIAGNOSIS — R73.9 HYPERGLYCEMIA: ICD-10-CM

## 2020-03-10 DIAGNOSIS — E78.2 MIXED HYPERLIPIDEMIA: Chronic | ICD-10-CM

## 2020-03-10 DIAGNOSIS — I10 ESSENTIAL HYPERTENSION: Primary | Chronic | ICD-10-CM

## 2020-03-10 DIAGNOSIS — I10 ESSENTIAL HYPERTENSION: Chronic | ICD-10-CM

## 2020-03-10 PROBLEM — F43.21 GRIEF REACTION: Status: RESOLVED | Noted: 2019-10-03 | Resolved: 2020-03-10

## 2020-03-10 PROBLEM — F43.20 GRIEF REACTION: Status: RESOLVED | Noted: 2019-10-03 | Resolved: 2020-03-10

## 2020-03-10 PROCEDURE — 99214 OFFICE O/P EST MOD 30 MIN: CPT | Performed by: INTERNAL MEDICINE

## 2020-03-10 RX ORDER — HYDROCHLOROTHIAZIDE 12.5 MG/1
TABLET ORAL
Qty: 90 TABLET | Refills: 3 | Status: SHIPPED | OUTPATIENT
Start: 2020-03-10 | End: 2021-09-17

## 2020-03-10 RX ORDER — AMLODIPINE BESYLATE 10 MG/1
10 TABLET ORAL DAILY
Qty: 30 TABLET | Refills: 3 | Status: SHIPPED | OUTPATIENT
Start: 2020-03-10 | End: 2020-08-23 | Stop reason: SDUPTHER

## 2020-03-10 NOTE — PROGRESS NOTES
[facility] – [place] INTERNAL MEDICINE  FLORIAN CONKLIN M.D.      Nicolás DONALDSON Wayne / 74 y.o. / male  03/10/2020      CHIEF COMPLAINT     Hypertension and Hyperlipidemia      ASSESSMENT & PLAN     Problem List Items Addressed This Visit        High    Hypertension - Primary (Chronic)    Current Assessment & Plan     · Increase amlodipine to 10 mg qd.   · Continue HCTZ 12.5 mg and benazepril 40 mg qd.   · [facility] BP link for 4 weeks.      Follow-up 2 months.          Relevant Medications    benazepril (LOTENSIN) 40 MG tablet    hydroCHLOROthiazide (HYDRODIURIL) 12.5 MG tablet    amLODIPine (NORVASC) 10 MG tablet    Other Relevant Orders    [facility] BP Flowsheet       Medium    Mixed hyperlipidemia (Chronic)    Current Assessment & Plan     Continue rosuvastatin 10 mg          Relevant Medications    rosuvastatin (CRESTOR) 10 MG tablet    Other Relevant Orders    Lipid Panel With / Chol / HDL Ratio       Low    Hyperglycemia    Current Assessment & Plan     Has reduced drinking. Check A1c.   Lab Results   Component Value Date     (H) 01/29/2019     (H) 07/24/2018     (H) 07/02/2018     Lab Results   Component Value Date    HGBA1C 5.3 02/28/2018    HGBA1C 5.40 02/08/2018             Relevant Orders    Hemoglobin A1c        Orders Placed This Encounter   Procedures   • [facility] BP Flowsheet   • Hemoglobin A1c   • Lipid Panel With / Chol / HDL Ratio     New Medications Ordered This Visit   Medications   • amLODIPine (NORVASC) 10 MG tablet     Sig: Take 1 tablet by mouth Daily.     Dispense:  30 tablet     Refill:  3       Summary/Discussion:  Advance Care Planning   ACP discussion was held with the patient during this visit. Patient does not have an advance directive, information provided.      Next Appointment with me: Visit date not found    Return in about 2 months (around 5/10/2020) for Hypertension.      MEDICATIONS     Current Outpatient Medications   Medication Sig Dispense Refill   • acetaminophen (TYLENOL) 650 MG 8  "hr tablet Take 650 mg by mouth.     • amLODIPine (NORVASC) 5 MG tablet Take 1 tablet by mouth Daily. 30 tablet 3   • benazepril (LOTENSIN) 40 MG tablet TAKE ONE TABLET BY MOUTH DAILY 90 tablet 3   • hydroCHLOROthiazide (HYDRODIURIL) 12.5 MG tablet Take 1 tablet by mouth Daily. 30 tablet 3   • Multiple Vitamins-Minerals (MULTIVITAMIN ADULT PO) Take 1 tablet by mouth Daily.     • ondansetron ODT (ZOFRAN-ODT) 4 MG disintegrating tablet Take 1 tablet by mouth Every 8 (Eight) Hours As Needed for Nausea or Vomiting. 10 tablet 0   • pantoprazole (PROTONIX) 40 MG EC tablet Take 1 tablet by mouth Daily. 90 tablet 5   • rosuvastatin (CRESTOR) 10 MG tablet TAKE ONE TABLET BY MOUTH EVERY NIGHT 90 tablet 0   • Selenium 200 MCG capsule Take 200 mcg by mouth Daily.       Current Facility-Administered Medications   Medication Dose Route Frequency Provider Last Rate Last Dose   • nitroglycerin (NITROSTAT) SL tablet 0.4 mg  0.4 mg Sublingual Q5 Min PRN Hope Sahu MD       • sodium chloride 0.9 % flush 10 mL  10 mL Intravenous PRN Hope Sahu MD              VITAL SIGNS     Visit Vitals  /90   Pulse 74   Temp 97.9 °F (36.6 °C)   Ht 162.6 cm (64\")   Wt 88 kg (194 lb)   SpO2 96%   BMI 33.30 kg/m²       BP Readings from Last 3 Encounters:   03/10/20 166/90   12/04/19 150/80   11/17/19 150/87     Wt Readings from Last 3 Encounters:   03/10/20 88 kg (194 lb)   12/04/19 89.4 kg (197 lb)   11/17/19 86.2 kg (190 lb)      Body mass index is 33.3 kg/m².      HISTORY OF PRESENT ILLNESS     Chronic essential hypertension:  Since prior visit: compliant with medication(s), checks blood pressure regularly, denies significant problems with medication(s) and SBP > 150.  Most recent in-office blood pressure readings:   BP Readings from Last 3 Encounters:   03/10/20 166/90   12/04/19 150/80   11/17/19 150/87     Chronic hyperlipidemia:  Current therapy include rosuvastatin, denies problems with medication.    Most recent labs: "   Lab Results   Component Value Date     (H) 01/29/2019     (H) 02/08/2018    HDL 41 01/29/2019    HDL 45 02/08/2018    TRIG 185 (H) 01/29/2019    CHOLHDLRATIO 4.37 01/29/2019    CHOLHDLRATIO 4.49 02/08/2018     Hyperglycemia with normal A1c. Has reduced alcohol. Weight remains same.   Lab Results   Component Value Date     (H) 01/29/2019     (H) 07/24/2018     (H) 07/02/2018     Lab Results   Component Value Date    HGBA1C 5.3 02/28/2018    HGBA1C 5.40 02/08/2018          Patient Care Team:  Eusebio Seaman MD as PCP - General (Internal Medicine)  Eusebio Seaman MD as PCP - Claims Attributed  Teodoro Ling MD as Consulting Physician (Orthopedic Surgery)      REVIEW OF SYSTEMS     Review of Systems  Constitutional neg  Resp neg  CV neg  Psych emotionally better after loss of spouse previously     PHYSICAL EXAMINATION     Physical Exam  Constitutional  No distress  Cardiovascular Rate  normal . Rhythm: regular . Heart sounds:  Normal  Pulmonary/Chest: Effort normal and breath sounds normal.    Psychiatric  Alert. Judgment intact. Thought content normal. Mood normal    REVIEWED DATA     Labs:     Lab Results   Component Value Date     11/17/2019    K 3.8 11/17/2019    CALCIUM 9.5 11/17/2019    AST 25 11/17/2019    ALT 39 11/17/2019    BUN 23 11/17/2019    CREATININE 1.07 11/17/2019    CREATININE 0.99 09/12/2019    CREATININE 1.06 01/29/2019    EGFRIFNONA 68 11/17/2019    EGFRIFAFRI 83 01/29/2019       Lab Results   Component Value Date    HGBA1C 5.3 02/28/2018    HGBA1C 5.40 02/08/2018     (H) 01/29/2019     (H) 07/24/2018     (H) 07/02/2018       Lab Results   Component Value Date     (H) 01/29/2019     (H) 02/08/2018    HDL 41 01/29/2019    HDL 45 02/08/2018    TRIG 185 (H) 01/29/2019    TRIG 280 (H) 02/08/2018    CHOLHDLRATIO 4.37 01/29/2019    CHOLHDLRATIO 4.49 02/08/2018       Lab Results   Component Value Date    TSH 3.230 04/17/2019     FREET4 1.17 04/17/2019       Lab Results   Component Value Date    WBC 12.99 (H) 11/17/2019    HGB 17.4 11/17/2019    HGB 17.7 09/12/2019    HGB 13.7 07/24/2018     11/17/2019       Lab Results   Component Value Date    GLUCOSEU Negative 11/17/2019    BLOODU Negative 11/17/2019    NITRITEU Negative 11/17/2019    LEUKOCYTESUR Negative 11/17/2019       Imaging:         Medical Tests:         Summary of old records / correspondence / consultant report:         Request outside records:         ALLERGIES  No Known Allergies     PFSH:     The following portions of the patient's history were reviewed and updated as appropriate: Allergies / Current Medications / Past Medical History / Surgical History / Social History / Family History    PROBLEM LIST   Patient Active Problem List   Diagnosis   • Hypertension   • Mixed hyperlipidemia   • Obesity (BMI 30-39.9)   • Hyperglycemia       PAST MEDICAL HISTORY  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 of 18   • Elevated PSA    • Hyperlipidemia    • Hypertension        SURGICAL HISTORY  Past Surgical History:   Procedure Laterality Date   • COLONOSCOPY N/A 9/27/2017    Procedure: COLONOSCOPY into cecum and TI;  Surgeon: Braydon DHALIWAL MD;  Location: [facility] ENDOSCOPY;  Service:    • CRANIOTOMY      age of 18y ; blood clot    • ENDOSCOPY N/A 9/26/2017    Procedure: ESOPHAGOGASTRODUODENOSCOPY WITH BOPSIES;  Surgeon: Hunter Clayton MD;  Location: [facility] ENDOSCOPY;  Service:    • ENDOSCOPY N/A 12/21/2017    Procedure: ESOPHAGOGASTRODUODENOSCOPY with biopsies;  Surgeon: Hunter Clayton MD;  Location: [facility] ENDOSCOPY;  Service:    • HIP ARTHROPLASTY Right 02/27/2018   • TOTAL HIP ARTHROPLASTY Left 07/23/2018   • VASECTOMY         SOCIAL HISTORY  Social History     Socioeconomic History   • Marital status:      Spouse name: Surya*   • Number of  "children: 2   • Years of education: Not on file   • Highest education level: Not on file   Occupational History   • Occupation: 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)   Tobacco Use   • Smoking status: Former Smoker     Years: 30.00     Types: Cigarettes     Last attempt to quit:      Years since quittin.2   • Smokeless tobacco: Never Used   • Tobacco comment: quit 20 years ago   Substance and Sexual Activity   • Alcohol use: Yes     Comment: Drinking about a \"pint\" of vodka since recent death of spouse   • Drug use: No   • Sexual activity: Never   Social History Narrative    2019: Wife Surya*  from AML        FAMILY HISTORY  Family History   Problem Relation Age of Onset   • Lung cancer Mother         smoker;  age 64   • Anxiety disorder Mother    • Depression Mother    • Alcohol abuse Mother    • Heart attack Father 64   • Alcohol abuse Father    • Urolithiasis Other         all siblings   • Esophageal cancer Brother    • Depression Brother    • Hypertension Brother    • Hyperlipidemia Brother    • Diabetes Brother    • Hypertension Sister    • Hyperlipidemia Sister    • Colon cancer Neg Hx    • Prostate cancer Neg Hx    • Dementia Neg Hx          **Dragon Disclaimer:   Much of this encounter note is an electronic transcription/translation of spoken language to printed text. The electronic translation of spoken language may permit erroneous, or at times, nonsensical words or phrases to be inadvertently transcribed. Although I have reviewed the note for such errors, some may still exist.       Template created by Panda Seaman MD   "

## 2020-03-10 NOTE — ASSESSMENT & PLAN NOTE
· Increase amlodipine to 10 mg qd.   · Continue HCTZ 12.5 mg and benazepril 40 mg qd.   · MyChart BP link for 4 weeks.      Follow-up 2 months.

## 2020-03-10 NOTE — ASSESSMENT & PLAN NOTE
Has reduced drinking. Check A1c.   Lab Results   Component Value Date     (H) 01/29/2019     (H) 07/24/2018     (H) 07/02/2018     Lab Results   Component Value Date    HGBA1C 5.3 02/28/2018    HGBA1C 5.40 02/08/2018

## 2020-03-11 LAB
CHOLEST SERPL-MCNC: 197 MG/DL (ref 0–200)
CHOLEST/HDLC SERPL: 4.19 {RATIO}
HBA1C MFR BLD: 5.7 % (ref 4.8–5.6)
HDLC SERPL-MCNC: 47 MG/DL (ref 40–60)
LDLC SERPL CALC-MCNC: 108 MG/DL (ref 0–100)
TRIGL SERPL-MCNC: 208 MG/DL (ref 0–150)
VLDLC SERPL CALC-MCNC: 41.6 MG/DL

## 2020-04-23 DIAGNOSIS — I10 ESSENTIAL HYPERTENSION: Chronic | ICD-10-CM

## 2020-04-23 RX ORDER — BENAZEPRIL HYDROCHLORIDE 40 MG/1
40 TABLET, FILM COATED ORAL DAILY
Qty: 90 TABLET | Refills: 3 | Status: SHIPPED | OUTPATIENT
Start: 2020-04-23 | End: 2021-05-27

## 2020-05-01 RX ORDER — PANTOPRAZOLE SODIUM 40 MG/1
40 TABLET, DELAYED RELEASE ORAL DAILY
Qty: 90 TABLET | Refills: 3 | Status: SHIPPED | OUTPATIENT
Start: 2020-05-01 | End: 2020-05-27

## 2020-05-19 ENCOUNTER — OFFICE VISIT (OUTPATIENT)
Dept: INTERNAL MEDICINE | Age: 75
End: 2020-05-19

## 2020-05-19 VITALS
DIASTOLIC BLOOD PRESSURE: 82 MMHG | WEIGHT: 196 LBS | BODY MASS INDEX: 33.46 KG/M2 | HEIGHT: 64 IN | HEART RATE: 96 BPM | TEMPERATURE: 97.1 F | OXYGEN SATURATION: 98 % | SYSTOLIC BLOOD PRESSURE: 140 MMHG

## 2020-05-19 DIAGNOSIS — I10 ESSENTIAL HYPERTENSION: Chronic | ICD-10-CM

## 2020-05-19 DIAGNOSIS — K40.90 UNILATERAL INGUINAL HERNIA WITHOUT OBSTRUCTION OR GANGRENE, RECURRENCE NOT SPECIFIED: ICD-10-CM

## 2020-05-19 DIAGNOSIS — F32.9 REACTIVE DEPRESSION: ICD-10-CM

## 2020-05-19 DIAGNOSIS — K57.92 DIVERTICULITIS: Primary | ICD-10-CM

## 2020-05-19 PROCEDURE — 99214 OFFICE O/P EST MOD 30 MIN: CPT | Performed by: INTERNAL MEDICINE

## 2020-05-19 RX ORDER — CIPROFLOXACIN 500 MG/1
500 TABLET, FILM COATED ORAL 2 TIMES DAILY
Qty: 20 TABLET | Refills: 0 | Status: SHIPPED | OUTPATIENT
Start: 2020-05-19 | End: 2020-05-29

## 2020-05-19 RX ORDER — METRONIDAZOLE 500 MG/1
500 TABLET ORAL 3 TIMES DAILY
Qty: 30 TABLET | Refills: 0 | Status: SHIPPED | OUTPATIENT
Start: 2020-05-19 | End: 2020-05-29

## 2020-05-19 RX ORDER — CITALOPRAM 10 MG/1
10 TABLET ORAL DAILY
Qty: 30 TABLET | Refills: 2 | Status: SHIPPED | OUTPATIENT
Start: 2020-05-19 | End: 2020-08-17

## 2020-05-19 NOTE — PROGRESS NOTES
"[facility]  FLORIAN CONKLIN M.D.      Nicolás DONALDSON Wayne / 74 y.o. / male  05/19/2020      CHIEF COMPLAINT     LLQ abdominal pain x 1 week; Scrotal discomfort/fullness; and Hypertension       ASSESSMENT & PLAN     1. Diverticulitis    2. Reactive depression    3. Unilateral inguinal hernia without obstruction or gangrene, recurrence not specified    4. Essential hypertension    5. Mixed hyperlipidemia      Orders Placed This Encounter   Procedures   • Ambulatory Referral to General Surgery     New Medications Ordered This Visit   Medications   • citalopram (CeleXA) 10 MG tablet     Sig: Take 1 tablet by mouth Daily.     Dispense:  30 tablet     Refill:  2   • ciprofloxacin (Cipro) 500 MG tablet     Sig: Take 1 tablet by mouth 2 (Two) Times a Day for 10 days.     Dispense:  20 tablet     Refill:  0   • metroNIDAZOLE (FLAGYL) 500 MG tablet     Sig: Take 1 tablet by mouth 3 (Three) Times a Day for 10 days.     Dispense:  30 tablet     Refill:  0       Summary/Discussion:  · Empiric treatment for likely diverticulitis. If not improving by next week (or if worsening) check CT. Discussed with patient.   · Referral to surgery for likely left side inguinal hernia into scrotum   · Start SSRI for reactive depression (citalopram 10 mg qd). Instructed patient to watch for worsening mood symptoms, suicidal thoughts/ideations.   · Continue same for hypertension     Next Appointment with me: 9/8/2020    Return in about 3 months (around 8/19/2020) for Depression.      VITAL SIGNS     Visit Vitals  /82   Pulse 96   Temp 97.1 °F (36.2 °C)   Ht 162.6 cm (64\")   Wt 88.9 kg (196 lb)   SpO2 98%   BMI 33.64 kg/m²       BP Readings from Last 3 Encounters:   05/19/20 140/82   03/10/20 166/90   12/04/19 150/80     Wt Readings from Last 3 Encounters:   05/19/20 88.9 kg (196 lb)   03/10/20 88 kg (194 lb)   12/04/19 89.4 kg (197 lb)      Body mass index is 33.64 kg/m².      HISTORY OF PRESENT ILLNESS      1 week LLQ abdominal pain " that comes and goes. Last colonoscopy showed diverticula of the colon. Denies change in bowel. No fever.   Left scrotum feels full. No pain.   Reactive depression since death of spouse. Open to medication.   Hypertension remains stable here on medications.     Patient Care Team:  Eusebio Seaman MD as PCP - General (Internal Medicine)  Eusebio Seaman MD as PCP - Claims Attributed  Teodoro Ling MD as Consulting Physician (Orthopedic Surgery)      REVIEW OF SYSTEMS     Review of Systems  Constitutional neg  Resp neg  CV neg    PHYSICAL EXAMINATION     Physical Exam  Constitutional  No distress  Abdomen: protuberant. + LLQ abdominal pain with mild voluntary guarding.   Left scrotal fullness with distension; no significant tenderness to palpation   Psychiatric  Alert. Judgment intact. Thought content normal. Mood normal    REVIEWED DATA       Labs:     Lab Results   Component Value Date     11/17/2019    K 3.8 11/17/2019    CALCIUM 9.5 11/17/2019    AST 25 11/17/2019    ALT 39 11/17/2019    BUN 23 11/17/2019    CREATININE 1.07 11/17/2019    CREATININE 0.99 09/12/2019    CREATININE 1.06 01/29/2019    EGFRIFNONA 68 11/17/2019    EGFRIFAFRI 83 01/29/2019       Lab Results   Component Value Date    HGBA1C 5.70 (H) 03/10/2020    HGBA1C 5.3 02/28/2018    HGBA1C 5.40 02/08/2018     (H) 01/29/2019     (H) 07/24/2018     (H) 07/02/2018       Lab Results   Component Value Date     (H) 03/10/2020     (H) 01/29/2019     (H) 02/08/2018    HDL 47 03/10/2020    TRIG 208 (H) 03/10/2020    CHOLHDLRATIO 4.19 03/10/2020       Lab Results   Component Value Date    TSH 3.230 04/17/2019    FREET4 1.17 04/17/2019       Lab Results   Component Value Date    WBC 12.99 (H) 11/17/2019    HGB 17.4 11/17/2019    HGB 17.7 09/12/2019    HGB 13.7 07/24/2018     11/17/2019       Lab Results   Component Value Date    GLUCOSEU Negative 11/17/2019    BLOODU Negative 11/17/2019    NITRITEU Negative  2019    LEUKOCYTESUR Negative 2019       Imaging:     CT SCAN OF THE ABDOMEN AND PELVIS WITH INTRAVENOUS CONTRAST     HISTORY: Low abdominal pain and vomiting and some diarrhea.     The CT scan was performed as an emergency procedure through the abdomen  and pelvis with intravenous contrast and demonstrates the followin. There is some minimal scarring at the lung bases. There is a very  small hiatus hernia measuring 4.1 cm. There is mild diffuse fatty  infiltration of the liver. The spleen, pancreas, both adrenal glands,  and both kidneys are unremarkable. The gallbladder also appears normal.  2. There is borderline aneurysmal enlargement of the infrarenal aorta  measuring 2.3 cm. There is no retroperitoneal lymphadenopathy. There is  slight prominence of several small bowel loops containing fluid and  measuring up to 2.2 cm in diameter. There is some generalized  enhancement of the small bowel mucosa and this is nonspecific but  suggests an element of enteritis. There is some scattered diverticulosis  throughout the colon but no inflammatory change is seen. The appendix  appears normal.  3. In the pelvis, there is a left inguinal ring hernia containing a  portion of the left dome of the urinary bladder. The remainder of the  pelvis is unremarkable.         Medical Tests:         Summary of old records / correspondence / consultant report:         Request outside records:         ALLERGIES  No Known Allergies     MEDICATIONS  Current Outpatient Medications   Medication Sig Dispense Refill   • acetaminophen (TYLENOL) 650 MG 8 hr tablet Take 650 mg by mouth.     • amLODIPine (NORVASC) 10 MG tablet Take 1 tablet by mouth Daily. 30 tablet 3   • benazepril (LOTENSIN) 40 MG tablet Take 1 tablet by mouth Daily. 90 tablet 3   • hydroCHLOROthiazide (HYDRODIURIL) 12.5 MG tablet TAKE ONE TABLET BY MOUTH DAILY 90 tablet 3   • Multiple Vitamins-Minerals (MULTIVITAMIN ADULT PO) Take 1 tablet by mouth Daily.     •  pantoprazole (PROTONIX) 40 MG EC tablet Take 1 tablet by mouth Daily. 90 tablet 3   • rosuvastatin (CRESTOR) 10 MG tablet TAKE ONE TABLET BY MOUTH EVERY NIGHT 90 tablet 0   • Selenium 200 MCG capsule Take 200 mcg by mouth Daily.     • ciprofloxacin (Cipro) 500 MG tablet Take 1 tablet by mouth 2 (Two) Times a Day for 10 days. 20 tablet 0   • citalopram (CeleXA) 10 MG tablet Take 1 tablet by mouth Daily. 30 tablet 2   • metroNIDAZOLE (FLAGYL) 500 MG tablet Take 1 tablet by mouth 3 (Three) Times a Day for 10 days. 30 tablet 0   • ondansetron ODT (ZOFRAN-ODT) 4 MG disintegrating tablet Take 1 tablet by mouth Every 8 (Eight) Hours As Needed for Nausea or Vomiting. 10 tablet 0     Current Facility-Administered Medications   Medication Dose Route Frequency Provider Last Rate Last Dose   • nitroglycerin (NITROSTAT) SL tablet 0.4 mg  0.4 mg Sublingual Q5 Min PRN Hope Sahu MD       • sodium chloride 0.9 % flush 10 mL  10 mL Intravenous PRN Hope Sahu MD           PFSH:     The following portions of the patient's history were reviewed and updated as appropriate: Allergies / Current Medications / Past Medical History / Surgical History / Social History / Family History    PROBLEM LIST   Patient Active Problem List   Diagnosis   • Hypertension   • Mixed hyperlipidemia   • Obesity (BMI 30-39.9)   • Hyperglycemia       PAST MEDICAL HISTORY  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 of 18   • Elevated PSA    • Hyperlipidemia    • Hypertension        SURGICAL HISTORY  Past Surgical History:   Procedure Laterality Date   • COLONOSCOPY N/A 9/27/2017    Procedure: COLONOSCOPY into cecum and TI;  Surgeon: Braydon DHALIWAL MD;  Location: [facility] ENDOSCOPY;  Service:    • CRANIOTOMY      age of 18y ; blood clot    • ENDOSCOPY N/A 9/26/2017    Procedure: ESOPHAGOGASTRODUODENOSCOPY WITH  "BOPSIES;  Surgeon: Hunter Clayton MD;  Location: [facility] ENDOSCOPY;  Service:    • ENDOSCOPY N/A 2017    Procedure: ESOPHAGOGASTRODUODENOSCOPY with biopsies;  Surgeon: Hunter Clayton MD;  Location: [facility] ENDOSCOPY;  Service:    • HIP ARTHROPLASTY Right 2018   • TOTAL HIP ARTHROPLASTY Left 2018   • VASECTOMY         SOCIAL HISTORY  Social History     Socioeconomic History   • Marital status:      Spouse name: Tony ()   • Number of children: 2   • Years of education: Not on file   • Highest education level: Not on file   Occupational History   • Occupation: 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)   Tobacco Use   • Smoking status: Former Smoker     Years: 30.00     Types: Cigarettes     Last attempt to quit:      Years since quittin.3   • Smokeless tobacco: Never Used   • Tobacco comment: quit 20 years ago   Substance and Sexual Activity   • Alcohol use: Yes     Comment: Drinking about a \"pint\" of vodka since recent death of spouse   • Drug use: No   • Sexual activity: Never   Social History Narrative    2019: Wife Tony  from AML        FAMILY HISTORY  Family History   Problem Relation Age of Onset   • Lung cancer Mother         smoker;  age 64   • Anxiety disorder Mother    • Depression Mother    • Alcohol abuse Mother    • Heart attack Father 64   • Alcohol abuse Father    • Urolithiasis Other         all siblings   • Esophageal cancer Brother    • Depression Brother    • Hypertension Brother    • Hyperlipidemia Brother    • Diabetes Brother    • Hypertension Sister    • Hyperlipidemia Sister    • Colon cancer Neg Hx    • Prostate cancer Neg Hx    • Dementia Neg Hx          **Dragon Disclaimer:   Much of this encounter note is an electronic transcription/translation of spoken language to printed text. The electronic translation of spoken language may permit erroneous, or at times, nonsensical words or phrases to be inadvertently transcribed. Although I have reviewed the " note for such errors, some may still exist.     Template created by Panda Seaman MD

## 2020-05-27 RX ORDER — PANTOPRAZOLE SODIUM 40 MG/1
TABLET, DELAYED RELEASE ORAL
Qty: 30 TABLET | Refills: 11 | Status: SHIPPED | OUTPATIENT
Start: 2020-05-27 | End: 2021-05-27

## 2020-06-01 ENCOUNTER — OFFICE VISIT (OUTPATIENT)
Dept: SURGERY | Facility: CLINIC | Age: 75
End: 2020-06-01

## 2020-06-01 VITALS — HEIGHT: 64 IN | BODY MASS INDEX: 34.01 KG/M2 | WEIGHT: 199.2 LBS

## 2020-06-01 DIAGNOSIS — K40.90 LEFT INGUINAL HERNIA: Primary | ICD-10-CM

## 2020-06-01 PROCEDURE — 99203 OFFICE O/P NEW LOW 30 MIN: CPT | Performed by: SURGERY

## 2020-06-01 NOTE — H&P (VIEW-ONLY)
Cc: Left inguinal hernia    History of presenting illness:   This is a very nice and reasonably healthy 74-year-old gentleman who says that he believes he noticed some swelling in the left groin about 9 months ago.  It is associated with only minimal discomfort but has gotten slightly larger.  It seems to be worse when he is on his feet and improves when he lays flat.  A couple of weeks ago he also had some left lower quadrant pain and was given a provisional diagnosis of mild acute diverticulitis and prescribed antibiotics, but he did not take any antibiotics, and his left lower quadrant pain symptoms have resolved completely.  He reports a prior history of some hemorrhoids as well and underwent colonoscopy by Dr. Wade which documented these hemorrhoids, but currently he is not having any significant symptoms outside of some intermittent mild itching which does not last.    Past Medical History: Hyperlipidemia, obesity, gastroesophageal reflux disease, peptic ulcer disease (resolved), depression, hypertension    Past Surgical History: Significant for what sounds like a craniotomy at the base of his skull is a 18-year-old for what he describes as a clot with no significant residual symptoms.  He has had upper and lower endoscopy.  Vasectomy.  Bilateral hip replacement.  No abdominal surgery.    Medications: Hydrochlorothiazide, Protonix, Crestor, multivitamin, Norvasc, Celexa, Lotensin    Allergies: None known    Social History: He is a former smoker who quit over 20 years ago, lives independently,     Family History: Lung cancer in his mother, cardiac disease in his father    Review of Systems:  Constitutional: Negative for fever, chills, change in weight  Neck: no swollen glands or dysphagia or odynophagia  Respiratory: negative for SOB, cough, hemoptysis or wheezing  Cardiovascular: negative for chest pain, palpitations or peripheral edema  Gastrointestinal: Positive for left lower quadrant abdominal  pain, currently resolved, negative for nausea or vomiting  Genitourinary: Positive for weak stream, frequency and incomplete emptying      Physical Exam:   Body mass index 34.2  General: alert and oriented, appropriate, no acute distress  Neck: Supple without lymphadenopathy or thyromegaly, trachea is in the midline  Respiratory: Lungs are clear bilaterally without wheezing, no use of accessory muscles is noted  Cardiovascular: Regular rate and rhythm without murmur, no peripheral edema  Gastrointestinal: Soft, benign, obese, no ventral hernia appreciated, no tenderness, bowel sounds positive  Genitourinary: Normal male external genitalia.  Confirmed positive left inguinal hernia, reducible, mild tenderness.  No hernia felt on the right.    Laboratory data: Most recent hemoglobin A1c is 5.7    Imaging data: CT abdomen pelvis from last fall is reviewed by me.  A moderately large left inguinal hernia is identified with a portion of the urinary bladder within the inguinal canal as well as a moderate amount of either preperitoneal fat or lipoma of the cord.  There is also extensive diverticulosis without evidence of acute diverticulitis.      Assessment and plan:   -Reducible left inguinal hernia, initial  -Options are discussed with the patient.  I think given its relatively large size it would be wise to repair this hernia.  The risks of the operation including, but not limited to, bleeding, postoperative pain, hernia recurrence, seroma formation, injury to the testicular vessels, great vessels of the groin, bowel and bladder are all discussed.  In addition, I expressed to him that he is at relatively high risk for postoperative urinary retention or constipation.  He is agreeable to proceed with a da Kamlesh robot-assisted left inguinal hernia repair with mesh placement, possible open.      Donavon Monae MD, FACS  General, Minimally Invasive and Endoscopic Surgery  [facility]    [address],  [address]  [place], [place], [address]  P: [phone]  F: [phone]

## 2020-06-01 NOTE — PROGRESS NOTES
Cc: Left inguinal hernia    History of presenting illness:   This is a very nice and reasonably healthy 74-year-old gentleman who says that he believes he noticed some swelling in the left groin about 9 months ago.  It is associated with only minimal discomfort but has gotten slightly larger.  It seems to be worse when he is on his feet and improves when he lays flat.  A couple of weeks ago he also had some left lower quadrant pain and was given a provisional diagnosis of mild acute diverticulitis and prescribed antibiotics, but he did not take any antibiotics, and his left lower quadrant pain symptoms have resolved completely.  He reports a prior history of some hemorrhoids as well and underwent colonoscopy by Dr. Wade which documented these hemorrhoids, but currently he is not having any significant symptoms outside of some intermittent mild itching which does not last.    Past Medical History: Hyperlipidemia, obesity, gastroesophageal reflux disease, peptic ulcer disease (resolved), depression, hypertension    Past Surgical History: Significant for what sounds like a craniotomy at the base of his skull is a 18-year-old for what he describes as a clot with no significant residual symptoms.  He has had upper and lower endoscopy.  Vasectomy.  Bilateral hip replacement.  No abdominal surgery.    Medications: Hydrochlorothiazide, Protonix, Crestor, multivitamin, Norvasc, Celexa, Lotensin    Allergies: None known    Social History: He is a former smoker who quit over 20 years ago, lives independently,     Family History: Lung cancer in his mother, cardiac disease in his father    Review of Systems:  Constitutional: Negative for fever, chills, change in weight  Neck: no swollen glands or dysphagia or odynophagia  Respiratory: negative for SOB, cough, hemoptysis or wheezing  Cardiovascular: negative for chest pain, palpitations or peripheral edema  Gastrointestinal: Positive for left lower quadrant abdominal  pain, currently resolved, negative for nausea or vomiting  Genitourinary: Positive for weak stream, frequency and incomplete emptying      Physical Exam:   Body mass index 34.2  General: alert and oriented, appropriate, no acute distress  Neck: Supple without lymphadenopathy or thyromegaly, trachea is in the midline  Respiratory: Lungs are clear bilaterally without wheezing, no use of accessory muscles is noted  Cardiovascular: Regular rate and rhythm without murmur, no peripheral edema  Gastrointestinal: Soft, benign, obese, no ventral hernia appreciated, no tenderness, bowel sounds positive  Genitourinary: Normal male external genitalia.  Confirmed positive left inguinal hernia, reducible, mild tenderness.  No hernia felt on the right.    Laboratory data: Most recent hemoglobin A1c is 5.7    Imaging data: CT abdomen pelvis from last fall is reviewed by me.  A moderately large left inguinal hernia is identified with a portion of the urinary bladder within the inguinal canal as well as a moderate amount of either preperitoneal fat or lipoma of the cord.  There is also extensive diverticulosis without evidence of acute diverticulitis.      Assessment and plan:   -Reducible left inguinal hernia, initial  -Options are discussed with the patient.  I think given its relatively large size it would be wise to repair this hernia.  The risks of the operation including, but not limited to, bleeding, postoperative pain, hernia recurrence, seroma formation, injury to the testicular vessels, great vessels of the groin, bowel and bladder are all discussed.  In addition, I expressed to him that he is at relatively high risk for postoperative urinary retention or constipation.  He is agreeable to proceed with a da Kamlesh robot-assisted left inguinal hernia repair with mesh placement, possible open.      Donavon Monae MD, FACS  General, Minimally Invasive and Endoscopic Surgery  [facility] [place] [facility]    [address],  [address]  [place], [place], [address]  P: [phone]  F: [phone]

## 2020-06-08 ENCOUNTER — TRANSCRIBE ORDERS (OUTPATIENT)
Dept: PREADMISSION TESTING | Facility: HOSPITAL | Age: 75
End: 2020-06-08

## 2020-06-08 DIAGNOSIS — Z01.818 OTHER SPECIFIED PRE-OPERATIVE EXAMINATION: Primary | ICD-10-CM

## 2020-06-09 ENCOUNTER — APPOINTMENT (OUTPATIENT)
Dept: PREADMISSION TESTING | Facility: HOSPITAL | Age: 75
End: 2020-06-09

## 2020-06-09 VITALS
RESPIRATION RATE: 16 BRPM | SYSTOLIC BLOOD PRESSURE: 158 MMHG | DIASTOLIC BLOOD PRESSURE: 84 MMHG | HEART RATE: 83 BPM | WEIGHT: 196.1 LBS | BODY MASS INDEX: 38.5 KG/M2 | OXYGEN SATURATION: 95 % | TEMPERATURE: 98.4 F | HEIGHT: 60 IN

## 2020-06-09 DIAGNOSIS — K40.90 LEFT INGUINAL HERNIA: ICD-10-CM

## 2020-06-09 LAB
ANION GAP SERPL CALCULATED.3IONS-SCNC: 16.7 MMOL/L (ref 5–15)
BASOPHILS # BLD AUTO: 0.07 10*3/MM3 (ref 0–0.2)
BASOPHILS NFR BLD AUTO: 0.8 % (ref 0–1.5)
BUN BLD-MCNC: 28 MG/DL (ref 8–23)
BUN/CREAT SERPL: 20.9 (ref 7–25)
CALCIUM SPEC-SCNC: 9.7 MG/DL (ref 8.6–10.5)
CHLORIDE SERPL-SCNC: 101 MMOL/L (ref 98–107)
CO2 SERPL-SCNC: 22.3 MMOL/L (ref 22–29)
CREAT BLD-MCNC: 1.34 MG/DL (ref 0.76–1.27)
DEPRECATED RDW RBC AUTO: 43.6 FL (ref 37–54)
EOSINOPHIL # BLD AUTO: 0.11 10*3/MM3 (ref 0–0.4)
EOSINOPHIL NFR BLD AUTO: 1.3 % (ref 0.3–6.2)
ERYTHROCYTE [DISTWIDTH] IN BLOOD BY AUTOMATED COUNT: 12.5 % (ref 12.3–15.4)
GFR SERPL CREATININE-BSD FRML MDRD: 52 ML/MIN/1.73
GLUCOSE BLD-MCNC: 239 MG/DL (ref 65–99)
HCT VFR BLD AUTO: 45.8 % (ref 37.5–51)
HGB BLD-MCNC: 15.9 G/DL (ref 13–17.7)
IMM GRANULOCYTES # BLD AUTO: 0.03 10*3/MM3 (ref 0–0.05)
IMM GRANULOCYTES NFR BLD AUTO: 0.3 % (ref 0–0.5)
LYMPHOCYTES # BLD AUTO: 1.04 10*3/MM3 (ref 0.7–3.1)
LYMPHOCYTES NFR BLD AUTO: 12.1 % (ref 19.6–45.3)
MCH RBC QN AUTO: 32.6 PG (ref 26.6–33)
MCHC RBC AUTO-ENTMCNC: 34.7 G/DL (ref 31.5–35.7)
MCV RBC AUTO: 94 FL (ref 79–97)
MONOCYTES # BLD AUTO: 0.67 10*3/MM3 (ref 0.1–0.9)
MONOCYTES NFR BLD AUTO: 7.8 % (ref 5–12)
NEUTROPHILS # BLD AUTO: 6.7 10*3/MM3 (ref 1.7–7)
NEUTROPHILS NFR BLD AUTO: 77.7 % (ref 42.7–76)
NRBC BLD AUTO-RTO: 0 /100 WBC (ref 0–0.2)
PLATELET # BLD AUTO: 228 10*3/MM3 (ref 140–450)
PMV BLD AUTO: 9.5 FL (ref 6–12)
POTASSIUM BLD-SCNC: 3.7 MMOL/L (ref 3.5–5.2)
RBC # BLD AUTO: 4.87 10*6/MM3 (ref 4.14–5.8)
SODIUM BLD-SCNC: 140 MMOL/L (ref 136–145)
WBC NRBC COR # BLD: 8.62 10*3/MM3 (ref 3.4–10.8)

## 2020-06-09 PROCEDURE — 36415 COLL VENOUS BLD VENIPUNCTURE: CPT

## 2020-06-09 PROCEDURE — 85025 COMPLETE CBC W/AUTO DIFF WBC: CPT | Performed by: SURGERY

## 2020-06-09 PROCEDURE — 93005 ELECTROCARDIOGRAM TRACING: CPT

## 2020-06-09 PROCEDURE — 93010 ELECTROCARDIOGRAM REPORT: CPT | Performed by: INTERNAL MEDICINE

## 2020-06-09 PROCEDURE — 80048 BASIC METABOLIC PNL TOTAL CA: CPT | Performed by: SURGERY

## 2020-06-09 NOTE — DISCHARGE INSTRUCTIONS
Take the following medications the morning of surgery:    AMLODIPINE  PANTOPRAZOLE    ARRIVE AT 0600.        General Instructions:  • Do not eat solid food after midnight the night before surgery.  • You may drink clear liquids day of surgery but must stop at least one hour before your hospital arrival time.  • It is beneficial for you to have a clear drink that contains carbohydrates the day of surgery.  We suggest a 12 to 20 ounce bottle of Gatorade or Powerade for non-diabetic patients or a 12 to 20 ounce bottle of G2 or Powerade Zero for diabetic patients. (Pediatric patients, are not advised to drink a 12 to 20 ounce carbohydrate drink)    Clear liquids are liquids you can see through.  Nothing red in color.     Plain water                               Sports drinks  Sodas                                   Gelatin (Jell-O)  Fruit juices without pulp such as white grape juice and apple juice  Popsicles that contain no fruit or yogurt  Tea or coffee (no cream or milk added)  Gatorade / Powerade  G2 / Powerade Zero    • Infants may have breast milk up to four hours before surgery.  • Infants drinking formula may drink formula up to six hours before surgery.   • Patients who avoid smoking, chewing tobacco and alcohol for 4 weeks prior to surgery have a reduced risk of post-operative complications.  Quit smoking as many days before surgery as you can.  • Do not smoke, use chewing tobacco or drink alcohol the day of surgery.   • If applicable bring your C-PAP/ BI-PAP machine.  • Bring any papers given to you in the doctor’s office.  • Wear clean comfortable clothes.  • Do not wear contact lenses, false eyelashes or make-up.  Bring a case for your glasses.   • Bring crutches or walker if applicable.  • Remove all piercings.  Leave jewelry and any other valuables at home.  • Hair extensions with metal clips must be removed prior to surgery.  • The Pre-Admission Testing nurse will instruct you to bring medications if  unable to obtain an accurate list in Pre-Admission Testing.        If you were given a blood bank ID arm band remember to bring it with you the day of surgery.    Preventing a Surgical Site Infection:  • For 2 to 3 days before surgery, avoid shaving with a razor because the razor can irritate skin and make it easier to develop an infection.    • Any areas of open skin can increase the risk of a post-operative wound infection by allowing bacteria to enter and travel throughout the body.  Notify your surgeon if you have any skin wounds / rashes even if it is not near the expected surgical site.  The area will need assessed to determine if surgery should be delayed until it is healed.  • The night prior to surgery shower using a fresh bar of anti-bacterial soap (such as Dial) and clean washcloth.  Sleep in a clean bed with clean clothing.  Do not allow pets to sleep with you.  • Shower on the morning of surgery using a fresh bar of anti-bacterial soap (such as Dial) and clean washcloth.  Dry with a clean towel and dress in clean clothing.  • Ask your surgeon if you will be receiving antibiotics prior to surgery.  • Make sure you, your family, and all healthcare providers clean their hands with soap and water or an alcohol based hand  before caring for you or your wound.    Day of surgery:  Your arrival time is approximately two hours before your scheduled surgery time.  Upon arrival, a Pre-op nurse and Anesthesiologist will review your health history, obtain vital signs, and answer questions you may have.  The only belongings needed at this time will be a list of your home medications and if applicable your C-PAP/BI-PAP machine.  If you are staying overnight your family can leave the rest of your belongings in the car and bring them to your room later.  A Pre-op nurse will start an IV and you may receive medication in preparation for surgery, including something to help you relax.  Your family will be able to  see you in the Pre-op area.  Two visitors at a time will be allowed in the Pre-op room.  While you are in surgery your family should notify the waiting room  if they leave the waiting room area and provide a contact phone number.    Please be aware that surgery does come with discomfort.  We want to make every effort to control your discomfort so please discuss any uncontrolled symptoms with your nurse.   Your doctor will most likely have prescribed pain medications.      If you are going home after surgery you will receive individualized written care instructions before being discharged.  A responsible adult must drive you to and from the hospital on the day of your surgery and stay with you for 24 hours.    If you are staying overnight following surgery, you will be transported to your hospital room following the recovery period.  [facility] has all private rooms.    If you have any questions please call Pre-Admission Testing at [phone].  Deductibles and co-payments are collected on the day of service. Please be prepared to pay the required co-pay, deductible or deposit on the day of service as defined by your plan.    Patient Education for Self-Quarantine Process    Following your COVID testing, we strongly recommend that you do not leave your home after you have been tested for COVID except to get medical care. This includes not going to work, school or to public areas.  If this is not possible for you to do please limit your activities to only required outings.  Be sure to wear a mask when you are with other people, practice social distancing and wash your hands frequently.      The following items provide additional details to keep you safe.  • Wash your hands with soap and water frequently for at least 20 seconds.   • Avoid touching your eyes, nose and mouth with unwashed hands.  • Do not share anything - utensils, towels, food from the same bowl.   • Have your own utensils,  drinking glass, dishes, towels and bedding.   • Do not have visitors.   • Do use FaceTime to stay in touch with family and friends.  • You should stay in a specific room away from others if possible.   • Stay at least 6 feet away from others in the home if you cannot have a dedicated room to yourself.   • Do not snuggle with your pet. While the CDC says there is no evidence that pets can spread COVID-19 or be infected from humans, it is probably best to avoid “petting, snuggling, being kissed or licked and sharing food (during self-quarantine)”, according to the CDC.   • Sanitize household surfaces daily. Include all high touch areas (door handles, light switches, phones, countertops, etc.)  • Do not share a bathroom with others, if possible.   • Wear a mask around others in your home if you are unable to stay in a separate room or 6 feet apart. If  you are unable to wear a mask, have your family member wear a mask if they must be within 6 feet of you.   Call your surgeon immediately if you experience any of the following symptoms:  • Sore Throat  • Shortness of Breath or difficulty breathing  • Cough  • Chills  • Body soreness or muscle pain  • Headache  • Fever  • New loss of taste or smell  • Do not arrive for your surgery ill.  Your procedure will need to be rescheduled to another time.  You will need to call your physician before the day of surgery to avoid any unnecessary exposure to hospital staff as well as other patients.        CHLORHEXIDINE CLOTH INSTRUCTIONS  The morning of surgery follow these instructions using the Chlorhexidine cloths you've been given.  These steps reduce bacteria on the body.  Do not use the cloths near your eyes, ears mouth, genitalia or on open wounds.  Throw the cloths away after use but do not try to flush them down a toilet.      • Open and remove one cloth at a time from the package.    • Leave the cloth unfolded and begin the bathing.  • Massage the skin with the cloths using  gentle pressure to remove bacteria.  Do not scrub harshly.   • Follow the steps below with one 2% CHG cloth per area (6 total cloths).  • One cloth for neck, shoulders and chest.  • One cloth for both arms, hands, fingers and underarms (do underarms last).  • One cloth for the abdomen followed by groin.  • One cloth for right leg and foot including between the toes.  • One cloth for left leg and foot including between the toes.  • The last cloth is to be used for the back of the neck, back and buttocks.    Allow the CHG to air dry 3 minutes on the skin which will give it time to work and decrease the chance of irritation.  The skin may feel sticky until it is dry.  Do not rinse with water or any other liquid or you will lose the beneficial effects of the CHG.  If mild skin irritation occurs, do rinse the skin to remove the CHG.  Report this to the nurse at time of admission.  Do not apply lotions, creams, ointments, deodorants or perfumes after using the clothes. Dress in clean clothes before coming to the hospital.

## 2020-06-13 ENCOUNTER — LAB (OUTPATIENT)
Dept: LAB | Facility: HOSPITAL | Age: 75
End: 2020-06-13

## 2020-06-13 DIAGNOSIS — Z01.818 OTHER SPECIFIED PRE-OPERATIVE EXAMINATION: ICD-10-CM

## 2020-06-13 PROCEDURE — U0004 COV-19 TEST NON-CDC HGH THRU: HCPCS

## 2020-06-15 LAB
REF LAB TEST METHOD: NORMAL
SARS-COV-2 RNA RESP QL NAA+PROBE: NOT DETECTED

## 2020-06-16 ENCOUNTER — HOSPITAL ENCOUNTER (OUTPATIENT)
Facility: HOSPITAL | Age: 75
Setting detail: HOSPITAL OUTPATIENT SURGERY
Discharge: HOME | End: 2020-06-16
Attending: SURGERY | Admitting: SURGERY

## 2020-06-16 ENCOUNTER — ANESTHESIA EVENT (OUTPATIENT)
Dept: PERIOP | Facility: HOSPITAL | Age: 75
End: 2020-06-16

## 2020-06-16 ENCOUNTER — ANESTHESIA (OUTPATIENT)
Dept: PERIOP | Facility: HOSPITAL | Age: 75
End: 2020-06-16

## 2020-06-16 VITALS
SYSTOLIC BLOOD PRESSURE: 145 MMHG | OXYGEN SATURATION: 91 % | RESPIRATION RATE: 16 BRPM | BODY MASS INDEX: 41.93 KG/M2 | WEIGHT: 193.78 LBS | TEMPERATURE: 97.2 F | HEART RATE: 83 BPM | DIASTOLIC BLOOD PRESSURE: 84 MMHG

## 2020-06-16 DIAGNOSIS — K40.90 LEFT INGUINAL HERNIA: Primary | ICD-10-CM

## 2020-06-16 LAB — GLUCOSE BLDC GLUCOMTR-MCNC: 229 MG/DL (ref 70–130)

## 2020-06-16 PROCEDURE — 49650 LAP ING HERNIA REPAIR INIT: CPT | Performed by: SURGERY

## 2020-06-16 PROCEDURE — 25010000002 ONDANSETRON PER 1 MG: Performed by: NURSE ANESTHETIST, CERTIFIED REGISTERED

## 2020-06-16 PROCEDURE — 25010000002 HYDROMORPHONE PER 4 MG: Performed by: NURSE ANESTHETIST, CERTIFIED REGISTERED

## 2020-06-16 PROCEDURE — 25010000003 CEFAZOLIN IN DEXTROSE 2-4 GM/100ML-% SOLUTION: Performed by: SURGERY

## 2020-06-16 PROCEDURE — S2900 ROBOTIC SURGICAL SYSTEM: HCPCS | Performed by: SURGERY

## 2020-06-16 PROCEDURE — 25010000002 DIPHENHYDRAMINE PER 50 MG: Performed by: NURSE ANESTHETIST, CERTIFIED REGISTERED

## 2020-06-16 PROCEDURE — 25010000002 FENTANYL CITRATE (PF) 100 MCG/2ML SOLUTION: Performed by: NURSE ANESTHETIST, CERTIFIED REGISTERED

## 2020-06-16 PROCEDURE — 25010000002 DEXAMETHASONE PER 1 MG: Performed by: NURSE ANESTHETIST, CERTIFIED REGISTERED

## 2020-06-16 PROCEDURE — 25010000002 PROPOFOL 10 MG/ML EMULSION: Performed by: NURSE ANESTHETIST, CERTIFIED REGISTERED

## 2020-06-16 PROCEDURE — 82962 GLUCOSE BLOOD TEST: CPT

## 2020-06-16 PROCEDURE — 25010000002 PHENYLEPHRINE PER 1 ML: Performed by: NURSE ANESTHETIST, CERTIFIED REGISTERED

## 2020-06-16 PROCEDURE — C1781 MESH (IMPLANTABLE): HCPCS | Performed by: SURGERY

## 2020-06-16 DEVICE — BARD 3DMAX MESH LEFT LARGE
Type: IMPLANTABLE DEVICE | Status: FUNCTIONAL
Brand: BARD 3DMAX MESH

## 2020-06-16 DEVICE — DEV WND/CLS CONTRL TISS STRATAFIX SPIRAL MNCRYL SH 2/0 20CM: Type: IMPLANTABLE DEVICE | Status: FUNCTIONAL

## 2020-06-16 RX ORDER — IPRATROPIUM BROMIDE AND ALBUTEROL SULFATE 2.5; .5 MG/3ML; MG/3ML
3 SOLUTION RESPIRATORY (INHALATION) ONCE AS NEEDED
Status: DISCONTINUED | OUTPATIENT
Start: 2020-06-16 | End: 2020-06-16 | Stop reason: HOSPADM

## 2020-06-16 RX ORDER — HYDRALAZINE HYDROCHLORIDE 20 MG/ML
5 INJECTION INTRAMUSCULAR; INTRAVENOUS
Status: DISCONTINUED | OUTPATIENT
Start: 2020-06-16 | End: 2020-06-16 | Stop reason: HOSPADM

## 2020-06-16 RX ORDER — OXYCODONE AND ACETAMINOPHEN 7.5; 325 MG/1; MG/1
1 TABLET ORAL ONCE AS NEEDED
Status: DISCONTINUED | OUTPATIENT
Start: 2020-06-16 | End: 2020-06-16 | Stop reason: HOSPADM

## 2020-06-16 RX ORDER — HYDROCODONE BITARTRATE AND ACETAMINOPHEN 7.5; 325 MG/1; MG/1
1 TABLET ORAL EVERY 6 HOURS PRN
Qty: 30 TABLET | Refills: 0 | Status: SHIPPED | OUTPATIENT
Start: 2020-06-16 | End: 2020-06-29

## 2020-06-16 RX ORDER — PROMETHAZINE HYDROCHLORIDE 25 MG/1
25 TABLET ORAL ONCE AS NEEDED
Status: DISCONTINUED | OUTPATIENT
Start: 2020-06-16 | End: 2020-06-16 | Stop reason: HOSPADM

## 2020-06-16 RX ORDER — SODIUM CHLORIDE 0.9 % (FLUSH) 0.9 %
3-10 SYRINGE (ML) INJECTION AS NEEDED
Status: DISCONTINUED | OUTPATIENT
Start: 2020-06-16 | End: 2020-06-16 | Stop reason: HOSPADM

## 2020-06-16 RX ORDER — LIDOCAINE HYDROCHLORIDE 10 MG/ML
0.5 INJECTION, SOLUTION EPIDURAL; INFILTRATION; INTRACAUDAL; PERINEURAL ONCE AS NEEDED
Status: DISCONTINUED | OUTPATIENT
Start: 2020-06-16 | End: 2020-06-16 | Stop reason: HOSPADM

## 2020-06-16 RX ORDER — PROMETHAZINE HYDROCHLORIDE 25 MG/ML
12.5 INJECTION, SOLUTION INTRAMUSCULAR; INTRAVENOUS ONCE AS NEEDED
Status: DISCONTINUED | OUTPATIENT
Start: 2020-06-16 | End: 2020-06-16 | Stop reason: HOSPADM

## 2020-06-16 RX ORDER — HYDROMORPHONE HYDROCHLORIDE 1 MG/ML
0.5 INJECTION, SOLUTION INTRAMUSCULAR; INTRAVENOUS; SUBCUTANEOUS
Status: DISCONTINUED | OUTPATIENT
Start: 2020-06-16 | End: 2020-06-16 | Stop reason: HOSPADM

## 2020-06-16 RX ORDER — BUPIVACAINE HYDROCHLORIDE AND EPINEPHRINE 5; 5 MG/ML; UG/ML
INJECTION, SOLUTION EPIDURAL; INTRACAUDAL; PERINEURAL AS NEEDED
Status: DISCONTINUED | OUTPATIENT
Start: 2020-06-16 | End: 2020-06-16 | Stop reason: HOSPADM

## 2020-06-16 RX ORDER — FENTANYL CITRATE 50 UG/ML
50 INJECTION, SOLUTION INTRAMUSCULAR; INTRAVENOUS
Status: DISCONTINUED | OUTPATIENT
Start: 2020-06-16 | End: 2020-06-16 | Stop reason: HOSPADM

## 2020-06-16 RX ORDER — DIPHENHYDRAMINE HCL 25 MG
25 CAPSULE ORAL
Status: DISCONTINUED | OUTPATIENT
Start: 2020-06-16 | End: 2020-06-16 | Stop reason: HOSPADM

## 2020-06-16 RX ORDER — LIDOCAINE HYDROCHLORIDE 20 MG/ML
INJECTION, SOLUTION INFILTRATION; PERINEURAL AS NEEDED
Status: DISCONTINUED | OUTPATIENT
Start: 2020-06-16 | End: 2020-06-16 | Stop reason: SURG

## 2020-06-16 RX ORDER — ROCURONIUM BROMIDE 10 MG/ML
INJECTION, SOLUTION INTRAVENOUS AS NEEDED
Status: DISCONTINUED | OUTPATIENT
Start: 2020-06-16 | End: 2020-06-16 | Stop reason: SURG

## 2020-06-16 RX ORDER — NALOXONE HCL 0.4 MG/ML
0.2 VIAL (ML) INJECTION AS NEEDED
Status: DISCONTINUED | OUTPATIENT
Start: 2020-06-16 | End: 2020-06-16 | Stop reason: HOSPADM

## 2020-06-16 RX ORDER — ONDANSETRON 2 MG/ML
4 INJECTION INTRAMUSCULAR; INTRAVENOUS ONCE AS NEEDED
Status: DISCONTINUED | OUTPATIENT
Start: 2020-06-16 | End: 2020-06-16 | Stop reason: HOSPADM

## 2020-06-16 RX ORDER — ONDANSETRON 2 MG/ML
INJECTION INTRAMUSCULAR; INTRAVENOUS AS NEEDED
Status: DISCONTINUED | OUTPATIENT
Start: 2020-06-16 | End: 2020-06-16 | Stop reason: SURG

## 2020-06-16 RX ORDER — PROPOFOL 10 MG/ML
VIAL (ML) INTRAVENOUS AS NEEDED
Status: DISCONTINUED | OUTPATIENT
Start: 2020-06-16 | End: 2020-06-16 | Stop reason: SURG

## 2020-06-16 RX ORDER — ACETAMINOPHEN 325 MG/1
650 TABLET ORAL ONCE AS NEEDED
Status: COMPLETED | OUTPATIENT
Start: 2020-06-16 | End: 2020-06-16

## 2020-06-16 RX ORDER — FENTANYL CITRATE 50 UG/ML
INJECTION, SOLUTION INTRAMUSCULAR; INTRAVENOUS AS NEEDED
Status: DISCONTINUED | OUTPATIENT
Start: 2020-06-16 | End: 2020-06-16 | Stop reason: SURG

## 2020-06-16 RX ORDER — SODIUM CHLORIDE, SODIUM LACTATE, POTASSIUM CHLORIDE, CALCIUM CHLORIDE 600; 310; 30; 20 MG/100ML; MG/100ML; MG/100ML; MG/100ML
9 INJECTION, SOLUTION INTRAVENOUS CONTINUOUS
Status: DISCONTINUED | OUTPATIENT
Start: 2020-06-16 | End: 2020-06-16 | Stop reason: HOSPADM

## 2020-06-16 RX ORDER — LABETALOL HYDROCHLORIDE 5 MG/ML
INJECTION, SOLUTION INTRAVENOUS AS NEEDED
Status: DISCONTINUED | OUTPATIENT
Start: 2020-06-16 | End: 2020-06-16 | Stop reason: SURG

## 2020-06-16 RX ORDER — SODIUM CHLORIDE 0.9 % (FLUSH) 0.9 %
3 SYRINGE (ML) INJECTION EVERY 12 HOURS SCHEDULED
Status: DISCONTINUED | OUTPATIENT
Start: 2020-06-16 | End: 2020-06-16 | Stop reason: HOSPADM

## 2020-06-16 RX ORDER — HYDROCODONE BITARTRATE AND ACETAMINOPHEN 7.5; 325 MG/1; MG/1
1 TABLET ORAL ONCE AS NEEDED
Status: DISCONTINUED | OUTPATIENT
Start: 2020-06-16 | End: 2020-06-16 | Stop reason: HOSPADM

## 2020-06-16 RX ORDER — LABETALOL HYDROCHLORIDE 5 MG/ML
5 INJECTION, SOLUTION INTRAVENOUS
Status: DISCONTINUED | OUTPATIENT
Start: 2020-06-16 | End: 2020-06-16 | Stop reason: HOSPADM

## 2020-06-16 RX ORDER — FLUMAZENIL 0.1 MG/ML
0.2 INJECTION INTRAVENOUS AS NEEDED
Status: DISCONTINUED | OUTPATIENT
Start: 2020-06-16 | End: 2020-06-16 | Stop reason: HOSPADM

## 2020-06-16 RX ORDER — CEFAZOLIN SODIUM 2 G/100ML
2 INJECTION, SOLUTION INTRAVENOUS ONCE
Status: COMPLETED | OUTPATIENT
Start: 2020-06-16 | End: 2020-06-16

## 2020-06-16 RX ORDER — PROMETHAZINE HYDROCHLORIDE 25 MG/ML
6.25 INJECTION, SOLUTION INTRAMUSCULAR; INTRAVENOUS
Status: DISCONTINUED | OUTPATIENT
Start: 2020-06-16 | End: 2020-06-16 | Stop reason: HOSPADM

## 2020-06-16 RX ORDER — DEXAMETHASONE SODIUM PHOSPHATE 10 MG/ML
INJECTION INTRAMUSCULAR; INTRAVENOUS AS NEEDED
Status: DISCONTINUED | OUTPATIENT
Start: 2020-06-16 | End: 2020-06-16 | Stop reason: SURG

## 2020-06-16 RX ORDER — EPHEDRINE SULFATE 50 MG/ML
5 INJECTION, SOLUTION INTRAVENOUS ONCE AS NEEDED
Status: DISCONTINUED | OUTPATIENT
Start: 2020-06-16 | End: 2020-06-16 | Stop reason: HOSPADM

## 2020-06-16 RX ORDER — HYDROMORPHONE HCL 110MG/55ML
PATIENT CONTROLLED ANALGESIA SYRINGE INTRAVENOUS AS NEEDED
Status: DISCONTINUED | OUTPATIENT
Start: 2020-06-16 | End: 2020-06-16 | Stop reason: SURG

## 2020-06-16 RX ORDER — DIPHENHYDRAMINE HYDROCHLORIDE 50 MG/ML
12.5 INJECTION INTRAMUSCULAR; INTRAVENOUS
Status: DISCONTINUED | OUTPATIENT
Start: 2020-06-16 | End: 2020-06-16 | Stop reason: HOSPADM

## 2020-06-16 RX ORDER — SODIUM CHLORIDE 9 MG/ML
INJECTION, SOLUTION INTRAVENOUS AS NEEDED
Status: DISCONTINUED | OUTPATIENT
Start: 2020-06-16 | End: 2020-06-16 | Stop reason: HOSPADM

## 2020-06-16 RX ORDER — PROMETHAZINE HYDROCHLORIDE 25 MG/1
25 SUPPOSITORY RECTAL ONCE AS NEEDED
Status: DISCONTINUED | OUTPATIENT
Start: 2020-06-16 | End: 2020-06-16 | Stop reason: HOSPADM

## 2020-06-16 RX ADMIN — FENTANYL CITRATE 50 MCG: 50 INJECTION INTRAMUSCULAR; INTRAVENOUS at 10:02

## 2020-06-16 RX ADMIN — ROCURONIUM BROMIDE 50 MG: 10 INJECTION, SOLUTION INTRAVENOUS at 08:07

## 2020-06-16 RX ADMIN — ACETAMINOPHEN 650 MG: 325 TABLET, FILM COATED ORAL at 13:03

## 2020-06-16 RX ADMIN — HYDROMORPHONE HYDROCHLORIDE 0.5 MG: 2 INJECTION, SOLUTION INTRAMUSCULAR; INTRAVENOUS; SUBCUTANEOUS at 10:02

## 2020-06-16 RX ADMIN — HYDROMORPHONE HYDROCHLORIDE 0.5 MG: 2 INJECTION, SOLUTION INTRAMUSCULAR; INTRAVENOUS; SUBCUTANEOUS at 09:59

## 2020-06-16 RX ADMIN — LIDOCAINE HYDROCHLORIDE 100 MG: 20 INJECTION, SOLUTION INFILTRATION; PERINEURAL at 08:07

## 2020-06-16 RX ADMIN — CEFAZOLIN SODIUM 2 G: 2 INJECTION, SOLUTION INTRAVENOUS at 08:14

## 2020-06-16 RX ADMIN — ROCURONIUM BROMIDE 10 MG: 10 INJECTION, SOLUTION INTRAVENOUS at 08:52

## 2020-06-16 RX ADMIN — FENTANYL CITRATE 100 MCG: 50 INJECTION INTRAMUSCULAR; INTRAVENOUS at 08:04

## 2020-06-16 RX ADMIN — PROPOFOL 200 MG: 10 INJECTION, EMULSION INTRAVENOUS at 08:07

## 2020-06-16 RX ADMIN — ONDANSETRON HYDROCHLORIDE 4 MG: 2 SOLUTION INTRAMUSCULAR; INTRAVENOUS at 09:50

## 2020-06-16 RX ADMIN — SUGAMMADEX 400 MG: 100 INJECTION, SOLUTION INTRAVENOUS at 09:56

## 2020-06-16 RX ADMIN — FENTANYL CITRATE 50 MCG: 50 INJECTION INTRAMUSCULAR; INTRAVENOUS at 08:49

## 2020-06-16 RX ADMIN — SODIUM CHLORIDE, POTASSIUM CHLORIDE, SODIUM LACTATE AND CALCIUM CHLORIDE: 600; 310; 30; 20 INJECTION, SOLUTION INTRAVENOUS at 08:02

## 2020-06-16 RX ADMIN — DIPHENHYDRAMINE HYDROCHLORIDE 12.5 MG: 50 INJECTION, SOLUTION INTRAMUSCULAR; INTRAVENOUS at 10:30

## 2020-06-16 RX ADMIN — FENTANYL CITRATE 50 MCG: 50 INJECTION INTRAMUSCULAR; INTRAVENOUS at 08:38

## 2020-06-16 RX ADMIN — SODIUM CHLORIDE, POTASSIUM CHLORIDE, SODIUM LACTATE AND CALCIUM CHLORIDE: 600; 310; 30; 20 INJECTION, SOLUTION INTRAVENOUS at 08:53

## 2020-06-16 RX ADMIN — PHENYLEPHRINE HYDROCHLORIDE 100 MCG: 10 INJECTION INTRAVENOUS at 08:25

## 2020-06-16 RX ADMIN — LABETALOL HYDROCHLORIDE 5 MG: 5 INJECTION, SOLUTION INTRAVENOUS at 08:39

## 2020-06-16 RX ADMIN — DEXAMETHASONE SODIUM PHOSPHATE 8 MG: 10 INJECTION INTRAMUSCULAR; INTRAVENOUS at 08:14

## 2020-06-16 RX ADMIN — FENTANYL CITRATE 50 MCG: 50 INJECTION INTRAMUSCULAR; INTRAVENOUS at 10:05

## 2020-06-16 RX ADMIN — ROCURONIUM BROMIDE 10 MG: 10 INJECTION, SOLUTION INTRAVENOUS at 09:32

## 2020-06-16 NOTE — ANESTHESIA PREPROCEDURE EVALUATION
Anesthesia Evaluation                  Airway   Mallampati: III  TM distance: >3 FB  Neck ROM: full  Possible difficult intubation  Dental - normal exam     Pulmonary - normal exam   (+) asthma,  Cardiovascular - normal exam    (+) hypertension, DVT, hyperlipidemia,       Neuro/Psych  GI/Hepatic/Renal/Endo    (+) obesity, morbid obesity, GERD,  renal disease CRI,     ROS Comment: Elevated blood sugar    Musculoskeletal     Abdominal    Substance History   (+) alcohol use,      OB/GYN          Other   arthritis,                      Anesthesia Plan    ASA 3     general     intravenous induction     Anesthetic plan, all risks, benefits, and alternatives have been provided, discussed and informed consent has been obtained with: patient.

## 2020-06-16 NOTE — OP NOTE
Operative Note :   MD Jose Elias Tobiasralf DONALDSON Wayne  1945    Procedure Date: 06/16/20    Pre-op Diagnosis:  Left inguinal hernia [K40.90]    Post-Op Diagnosis:  Same    Procedure:   · Laparoscopic left inguinal hernia repair with da Kamlesh robot assistance and placement of mesh    Surgeon: Donavon Monae MD    Assistant: Patty Paniagua    Anesthesia:  General (general endotracheal tube)    EBL:   50 cc    Specimens:   None    Indications:  · 74-year-old gentleman with a symptomatic large inguinal hernia on the left    Findings:   · Large indirect defect on the left with extensive fat within the defect  · No hernia seen on the right    Recommendations:   · Routine post hernia care    Description of procedure:    After obtaining informed consent, patient was brought to the operating room and placed under a general anesthetic.  Calvert catheter was placed.  The patient's abdomen was sterilely prepped and draped.  Supraumbilical skin incision was made and I dissected through the subcutaneous tissue onto the fascia.  Fascia was incised.  12 mm robotic trocar was introduced.  The abdomen was insufflated with CO2.  Patient was positioned in Trendelenburg.  He had extensive mesenteric intra-abdominal fat.  Hernia defect was seen on the left side.  None was seen on the right.  Additional 8 mm robotic trochars were introduced, one on the left and one on the right at the level of the supraumbilical trocar.  The da Kamlesh Xi was then brought up and docked.  The Cadiere forceps were introduced on the left and the monopolar scissors on the right.  I incised the peritoneum just above and medial to the anterior superior iliac spine and carried this peritoneal incision medially across to the level of the obliterated median umbilical vein.  I then dissected along the preperitoneal space working my way medially.  It was quite difficult to identify Andrew's ligament, but eventually I was able to find it.  There was extensive fat and  several veins which required switching to the bipolar fenestrated grasper in order to control.  There was quite a large left-sided direct defect.  In fact in order to build a good good mesh coverage, I felt I needed to take down a portion of the left side of the bladder in order to allow the mesh to lay.  There was extensive fat within this hernia which was eventually dissected free.  The cord structures were identified lateral to the defect and these were lifted up and the peritoneum was peeled down.  There was no indirect defect.  Despite this dissection I was not able to clearly identify the epigastric vessels as there was too much fat to clearly see these.  The peritoneum was stripped down laterally, allowing enough space to lay the mesh.  I then introduced a large Bard 3D polypropylene at 12.8 x 16 cm mesh and oriented it appropriately.  I used a 2-0 Vicryl suture to secure the mesh to Andrew's ligament medially.  The mesh was then secured along the anterior abdominal wall medial and lateral to where I anticipated the epigastric vessels with lay.  I then ensured that all of the fat which have been within this hernia component lay between the mesh and the peritoneal flap.  I then closed the peritoneal flap with a running barbed 2-0 Monocryl suture, incorporating portions of this fat into the peritoneal closure.  I then used a separate 2-0 Vicryl suture to tack up the hernia sac against the abdominal wall to avoid this from slipping under the mesh.  The robot was then undocked.  The patient's abdomen was desufflated after removal of the needles.  Trochars were then withdrawn under direct visualization.  The abdomen was desufflated completely.  The midline fascial incision was closed with #1 Vicryl suture.  Skin incisions were closed with 4-0 Monocryl.  Sterile dressings were applied.    Donavon Monae MD  General and Endoscopic Surgery  [facility]    [address], [address]  [place], [place],  [address]  P: [phone]  F: [phone]

## 2020-06-16 NOTE — ANESTHESIA POSTPROCEDURE EVALUATION
Patient: Nicolás Black    Procedure Summary     Date:  06/16/20 Room / Location:  [facility] OR [address] [place] [address] MAIN OR    Anesthesia Start:  0802 Anesthesia Stop:  1022    Procedure:  left INGUINAL HERNIA REPAIR LAPAROSCOPIC WITH DAVINCI ROBOT (Left Abdomen) Diagnosis:       Left inguinal hernia      (Left inguinal hernia [K40.90])    Surgeon:  Donavon Monae MD Provider:  Lorraine Sharif MD    Anesthesia Type:  general ASA Status:  3          Anesthesia Type: general    Vitals  Vitals Value Taken Time   /86 6/16/2020 11:38 AM   Temp 36.2 °C (97.2 °F) 6/16/2020 11:38 AM   Pulse 75 6/16/2020 11:38 AM   Resp 18 6/16/2020 11:38 AM   SpO2 95 % 6/16/2020 11:38 AM           Post Anesthesia Care and Evaluation    Patient location during evaluation: bedside  Patient participation: complete - patient participated  Level of consciousness: awake  Pain management: adequate  Airway patency: patent  Anesthetic complications: No anesthetic complications    Cardiovascular status: acceptable  Respiratory status: acceptable  Hydration status: acceptable

## 2020-06-16 NOTE — ANESTHESIA PROCEDURE NOTES
Airway  Urgency: elective    Date/Time: 6/16/2020 8:12 AM  Difficult airway    General Information and Staff    Patient location during procedure: OR  Anesthesiologist: Lorraine Sharif MD  CRNA: Ana Salazar CRNA    Indications and Patient Condition  Indications for airway management: airway protection    Preoxygenated: yes  MILS maintained throughout  Mask difficulty assessment: 2 - vent by mask + OA or adjuvant +/- NMBA    Final Airway Details  Final airway type: endotracheal airway      Successful airway: ETT  Cuffed: yes   Successful intubation technique: video laryngoscopy  Facilitating devices/methods: intubating stylet and cricoid pressure  Endotracheal tube insertion site: oral  Blade: CMAC  Blade size: D  ETT size (mm): 8.0  Cormack-Lehane Classification: grade I - full view of glottis  Placement verified by: chest auscultation and capnometry   Cuff volume (mL): 9  Measured from: teeth  ETT/EBT  to teeth (cm): 22  Number of attempts at approach: 1  Assessment: lips, teeth, and gum same as pre-op and atraumatic intubation

## 2020-06-29 ENCOUNTER — OFFICE VISIT (OUTPATIENT)
Dept: SURGERY | Facility: CLINIC | Age: 75
End: 2020-06-29

## 2020-06-29 VITALS — BODY MASS INDEX: 41.92 KG/M2 | HEIGHT: 60 IN

## 2020-06-29 DIAGNOSIS — K40.90 LEFT INGUINAL HERNIA: Primary | ICD-10-CM

## 2020-06-29 PROCEDURE — 99024 POSTOP FOLLOW-UP VISIT: CPT | Performed by: SURGERY

## 2020-06-29 NOTE — PROGRESS NOTES
Follow-up left inguinal hernia repair    Subjective:  Doing well.  Denies any significant pain problems.  Bowels functioning normally and urine is without issue.    Objective:  Trocar sites are nicely healed, no sign of trocar site hernia  Left groin there is some fullness, consistent with either seroma or some residual fatty tissue, not surprising given the large size of his hernia, but no evidence of hernia recurrence is appreciated.  On the right upper chest wall there is a suspicious skin lesion which is concerning for squamous cell cancer, it is been present for many years per the patient.    Assessment and plan:  -Postop left inguinal hernia repair, recovering well, gradually increase activity  -Suspicious skin lesion right chest wall, I have recommended he return for surgical excision, patient is agreeable.    Donavon Monae MD  General and Endoscopic Surgery  [facility]    [address], [address]  [place], [place], [address]  P: [phone]  F: [phone]

## 2020-07-13 ENCOUNTER — PROCEDURE VISIT (OUTPATIENT)
Dept: SURGERY | Facility: CLINIC | Age: 75
End: 2020-07-13

## 2020-07-13 DIAGNOSIS — L98.9 SKIN LESION: Primary | ICD-10-CM

## 2020-07-13 DIAGNOSIS — L98.9 SKIN LESION OF CHEST WALL: ICD-10-CM

## 2020-07-13 PROCEDURE — 44970 LAPAROSCOPY APPENDECTOMY: CPT | Performed by: SURGERY

## 2020-07-13 PROCEDURE — 88305 TISSUE EXAM BY PATHOLOGIST: CPT | Performed by: SURGERY

## 2020-07-13 NOTE — PROGRESS NOTES
Procedure note:      Preoperative diagnosis: Suspicious skin lesion right anterior shoulder    Postoperative diagnosis: Same    Procedure performed: Excision suspicious skin lesion right anterior shoulder, size approximately 3 x 2 cm    Surgeon: Donavon Monae MD    Anesthesia: Local    Estimated blood loss: Less than 3 cc    Specimens: Suspicious skin lesion right anterior shoulder    Complications: None    Description of procedure:  After obtaining informed consent, patient was placed on the table.  His shoulder was sterilely prepped and draped.  The area was then infiltrated with a mixture of lidocaine and Marcaine.  I then made an elliptically oriented incision around the area of suspicious skin and excise this in elliptical fashion through the skin.  I then broke into the subcutaneous tissues and used the knife to remove the lesion in its entirety.  The total area excised was about 3 x 2 cm, with the long axis oriented craniocaudal.  I then used the Hyfrecator device to gain hemostasis.  The deeper tissues were reapproximated with 3-0 Vicryl.  The skin was closed with a running 4-0 nylon suture.  The patient tolerated this well.    Donavon Monae MD  General and Endoscopic Surgery  [facility] [place]    [address] [place] [address], [address]  [place], [place], [address]  P: [phone]  F: [phone]

## 2020-07-15 LAB
CYTO UR: NORMAL
LAB AP CASE REPORT: NORMAL
LAB AP CLINICAL INFORMATION: NORMAL
LAB AP DIAGNOSIS COMMENT: NORMAL
PATH REPORT.FINAL DX SPEC: NORMAL
PATH REPORT.GROSS SPEC: NORMAL

## 2020-07-20 DIAGNOSIS — E78.2 MIXED HYPERLIPIDEMIA: Chronic | ICD-10-CM

## 2020-07-20 RX ORDER — ROSUVASTATIN CALCIUM 10 MG/1
10 TABLET, COATED ORAL NIGHTLY
Qty: 90 TABLET | Refills: 3 | Status: SHIPPED | OUTPATIENT
Start: 2020-07-20 | End: 2022-03-11

## 2020-07-27 ENCOUNTER — OFFICE VISIT (OUTPATIENT)
Dept: SURGERY | Facility: CLINIC | Age: 75
End: 2020-07-27

## 2020-07-27 VITALS — HEIGHT: 60 IN | BODY MASS INDEX: 41.92 KG/M2

## 2020-07-27 DIAGNOSIS — L98.9 SKIN LESION OF CHEST WALL: Primary | ICD-10-CM

## 2020-07-27 PROCEDURE — 99024 POSTOP FOLLOW-UP VISIT: CPT | Performed by: SURGERY

## 2020-07-27 NOTE — PROGRESS NOTES
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

## 2020-08-16 DIAGNOSIS — F32.9 REACTIVE DEPRESSION: ICD-10-CM

## 2020-08-17 RX ORDER — CITALOPRAM 10 MG/1
TABLET ORAL
Qty: 30 TABLET | Refills: 5 | Status: SHIPPED | OUTPATIENT
Start: 2020-08-17 | End: 2021-02-16

## 2020-08-23 DIAGNOSIS — I10 ESSENTIAL HYPERTENSION: Chronic | ICD-10-CM

## 2020-08-23 RX ORDER — AMLODIPINE BESYLATE 10 MG/1
TABLET ORAL
Qty: 30 TABLET | Refills: 2 | Status: SHIPPED | OUTPATIENT
Start: 2020-08-23 | End: 2020-09-08

## 2020-09-08 ENCOUNTER — OFFICE VISIT (OUTPATIENT)
Dept: INTERNAL MEDICINE | Age: 75
End: 2020-09-08

## 2020-09-08 VITALS
OXYGEN SATURATION: 98 % | DIASTOLIC BLOOD PRESSURE: 84 MMHG | BODY MASS INDEX: 39.27 KG/M2 | HEART RATE: 95 BPM | TEMPERATURE: 98.1 F | WEIGHT: 200 LBS | HEIGHT: 60 IN | SYSTOLIC BLOOD PRESSURE: 170 MMHG

## 2020-09-08 DIAGNOSIS — Z00.00 MEDICARE ANNUAL WELLNESS VISIT, SUBSEQUENT: ICD-10-CM

## 2020-09-08 DIAGNOSIS — R73.9 HYPERGLYCEMIA: ICD-10-CM

## 2020-09-08 DIAGNOSIS — I10 ESSENTIAL HYPERTENSION: Primary | Chronic | ICD-10-CM

## 2020-09-08 DIAGNOSIS — Z12.5 ENCOUNTER FOR SCREENING FOR MALIGNANT NEOPLASM OF PROSTATE: ICD-10-CM

## 2020-09-08 DIAGNOSIS — R29.818 SUSPECTED SLEEP APNEA: ICD-10-CM

## 2020-09-08 DIAGNOSIS — F32.9 REACTIVE DEPRESSION: ICD-10-CM

## 2020-09-08 DIAGNOSIS — E78.2 MIXED HYPERLIPIDEMIA: Chronic | ICD-10-CM

## 2020-09-08 PROCEDURE — 90653 IIV ADJUVANT VACCINE IM: CPT | Performed by: INTERNAL MEDICINE

## 2020-09-08 PROCEDURE — G0439 PPPS, SUBSEQ VISIT: HCPCS | Performed by: INTERNAL MEDICINE

## 2020-09-08 PROCEDURE — G0008 ADMIN INFLUENZA VIRUS VAC: HCPCS | Performed by: INTERNAL MEDICINE

## 2020-09-08 PROCEDURE — 99214 OFFICE O/P EST MOD 30 MIN: CPT | Performed by: INTERNAL MEDICINE

## 2020-09-08 RX ORDER — AMLODIPINE BESYLATE 10 MG/1
10 TABLET ORAL
Qty: 30 TABLET | Refills: 2
Start: 2020-09-08 | End: 2020-12-02

## 2020-09-08 RX ORDER — DOXAZOSIN MESYLATE 1 MG/1
1 TABLET ORAL NIGHTLY
Qty: 30 TABLET | Refills: 3 | Status: SHIPPED | OUTPATIENT
Start: 2020-09-08 | End: 2020-12-03

## 2020-09-08 NOTE — ASSESSMENT & PLAN NOTE
Lab Results   Component Value Date     (H) 01/29/2019     (H) 07/24/2018     (H) 07/02/2018     Lab Results   Component Value Date    HGBA1C 5.70 (H) 03/10/2020    HGBA1C 5.3 02/28/2018    HGBA1C 5.40 02/08/2018      Maintain a low sugar/starch/carbohydrate diet and exercise regularly. Wt loss advised.  Minimize any alcohol.

## 2020-09-08 NOTE — ASSESSMENT & PLAN NOTE
Uncontrolled/worse. Start doxazosin 1 mg qHS. Take amlodipine 10 mg at bedtime. Continue HCTZ 12.5 mg and benazepril 40 mg qd. Send BP readings in 1 month.     BP Readings from Last 3 Encounters:   09/08/20 170/84   06/16/20 145/84   06/09/20 158/84

## 2020-09-08 NOTE — PROGRESS NOTES
"[facility] INTERNAL MEDICINE  FLORIAN CONKLIN M.D.      Nicolás DONALDSON Wayne / 74 y.o. / male  09/08/2020      VITAL SIGNS     Visit Vitals  /84   Pulse 95   Temp 98.1 °F (36.7 °C)   Ht 144.8 cm (57.01\")   Wt 90.7 kg (200 lb)   SpO2 98%   BMI 43.26 kg/m²       BP Readings from Last 3 Encounters:   09/08/20 170/84   06/16/20 145/84   06/09/20 158/84     Wt Readings from Last 3 Encounters:   09/08/20 90.7 kg (200 lb)   06/16/20 87.9 kg (193 lb 12.6 oz)   06/09/20 89 kg (196 lb 1.6 oz)     Body mass index is 43.26 kg/m².      MEDICATIONS     Current Outpatient Medications   Medication Sig Dispense Refill   • acetaminophen (TYLENOL) 650 MG 8 hr tablet Take 650 mg by mouth Every 8 (Eight) Hours As Needed.     • amLODIPine (NORVASC) 10 MG tablet Take 1 tablet by mouth every night at bedtime. 30 tablet 2   • benazepril (LOTENSIN) 40 MG tablet Take 1 tablet by mouth Daily. 90 tablet 3   • citalopram (CeleXA) 10 MG tablet TAKE ONE TABLET BY MOUTH DAILY 30 tablet 5   • hydroCHLOROthiazide (HYDRODIURIL) 12.5 MG tablet TAKE ONE TABLET BY MOUTH DAILY (Patient taking differently: Take 12.5 mg by mouth Daily.) 90 tablet 3   • Multiple Vitamins-Minerals (MULTIVITAMIN ADULT PO) Take 1 tablet by mouth Daily.     • pantoprazole (PROTONIX) 40 MG EC tablet TAKE ONE TABLET BY MOUTH DAILY (Patient taking differently: Take 40 mg by mouth Daily.) 30 tablet 11   • rosuvastatin (CRESTOR) 10 MG tablet Take 1 tablet by mouth Every Night. 90 tablet 3   • Selenium 200 MCG capsule Take 200 mcg by mouth Daily.         CHIEF COMPLAINT     Depression; Hypertension; and Hyperlipidemia      ASSESSMENT & PLAN     Problem List Items Addressed This Visit        High    Hypertension - Primary (Chronic)    Current Assessment & Plan     Uncontrolled/worse. Start doxazosin 1 mg qHS. Take amlodipine 10 mg at bedtime. Continue HCTZ 12.5 mg and benazepril 40 mg qd. Send BP readings in 1 month.     BP Readings from Last 3 Encounters:   09/08/20 170/84   06/16/20 145/84 "   06/09/20 158/84             Relevant Medications    hydroCHLOROthiazide (HYDRODIURIL) 12.5 MG tablet    benazepril (LOTENSIN) 40 MG tablet    amLODIPine (NORVASC) 10 MG tablet    doxazosin (CARDURA) 1 MG tablet    Other Relevant Orders    Ambulatory Referral to Sleep Medicine    TSH+Free T4    Comprehensive Metabolic Panel       Medium    Mixed hyperlipidemia (Chronic)    Overview     Continue rosuvastatin 10 mg qd.          Relevant Medications    rosuvastatin (CRESTOR) 10 MG tablet    Other Relevant Orders    Comprehensive Metabolic Panel    Reactive depression (Chronic)    Current Assessment & Plan     Continue citalopram 10 mg qd.          Relevant Medications    citalopram (CeleXA) 10 MG tablet       Low    Hyperglycemia (Chronic)    Current Assessment & Plan     Lab Results   Component Value Date     (H) 01/29/2019     (H) 07/24/2018     (H) 07/02/2018     Lab Results   Component Value Date    HGBA1C 5.70 (H) 03/10/2020    HGBA1C 5.3 02/28/2018    HGBA1C 5.40 02/08/2018      Maintain a low sugar/starch/carbohydrate diet and exercise regularly. Wt loss advised.  Minimize any alcohol.          Relevant Orders    Hemoglobin A1c    Suspected sleep apnea    Current Assessment & Plan     Sleep medicine referral.          Relevant Orders    Ambulatory Referral to Sleep Medicine      Other Visit Diagnoses     Encounter for screening for malignant neoplasm of prostate        Relevant Orders    PSA Screen        Orders Placed This Encounter   Procedures   • Fluad Quad 65+ yrs (3770-3185)   • PSA Screen   • Hemoglobin A1c   • TSH+Free T4   • Comprehensive Metabolic Panel   • Ambulatory Referral to Sleep Medicine     New Medications Ordered This Visit   Medications   • amLODIPine (NORVASC) 10 MG tablet     Sig: Take 1 tablet by mouth every night at bedtime.     Dispense:  30 tablet     Refill:  2   • doxazosin (CARDURA) 1 MG tablet     Sig: Take 1 tablet by mouth Every Night.     Dispense:  30  tablet     Refill:  3       Summary/Discussion:  •     Next Appointment with me: Visit date not found    Return in about 2 months (around 11/8/2020) for Hypertension.    _____________________________________________________________________________________    HISTORY OF PRESENT ILLNESS     Hypertension: has not been checking regularly, is compliant with medications. Wt gain noted.   Snores with daytime somnolence.  Denies headaches or chest pain.     Prior labs showed higher creatinine level. Complains of slow urinary flow and nocturia. No gross hematuria.     Hyperlipidemia on statin without problems.     Reactive depression since death of spouse in 2019 stable on citalopram.     History of elevated FBS and A1c. Last level higher.       Patient Care Team:  Eusebio Seaman MD as PCP - General (Internal Medicine)  Eusebio Seaman MD as PCP - Claims Attributed  Teodoro Ling MD as Consulting Physician (Orthopedic Surgery)      REVIEW OF SYSTEMS     Review of Systems  Wt gain  Snoring, no shortness of breath  No chest pain or palpitations   Neuro neg   Psych stable depression       PHYSICAL EXAMINATION     Physical Exam  Obese  Lungs clear to auscultation   Cardiovascular: Normal rate, regular rhythm and normal heart sounds. 1+ bilateral lower extremities edema.   Stable mood /judgment     REVIEWED DATA     Labs:     Lab Results   Component Value Date     06/09/2020    K 3.7 06/09/2020    CALCIUM 9.7 06/09/2020    AST 25 11/17/2019    ALT 39 11/17/2019    BUN 28 (H) 06/09/2020    CREATININE 1.34 (H) 06/09/2020    CREATININE 1.07 11/17/2019    CREATININE 0.99 09/12/2019    EGFRIFNONA 52 (L) 06/09/2020    EGFRIFAFRI 83 01/29/2019       Lab Results   Component Value Date    HGBA1C 5.70 (H) 03/10/2020    HGBA1C 5.3 02/28/2018    HGBA1C 5.40 02/08/2018     (H) 01/29/2019     (H) 07/24/2018     (H) 07/02/2018       Lab Results   Component Value Date     (H) 03/10/2020     (H)  01/29/2019     (H) 02/08/2018    HDL 47 03/10/2020    HDL 41 01/29/2019    HDL 45 02/08/2018    TRIG 208 (H) 03/10/2020    TRIG 185 (H) 01/29/2019    TRIG 280 (H) 02/08/2018    CHOLHDLRATIO 4.19 03/10/2020    CHOLHDLRATIO 4.37 01/29/2019    CHOLHDLRATIO 4.49 02/08/2018       Lab Results   Component Value Date    TSH 3.230 04/17/2019    FREET4 1.17 04/17/2019       Lab Results   Component Value Date    WBC 8.62 06/09/2020    HGB 15.9 06/09/2020    HGB 17.4 11/17/2019    HGB 17.7 09/12/2019     06/09/2020       Lab Results   Component Value Date    GLUCOSEU Negative 11/17/2019    BLOODU Negative 11/17/2019    NITRITEU Negative 11/17/2019    LEUKOCYTESUR Negative 11/17/2019       Imaging:         Medical Tests:         Summary of old records / correspondence / consultant report:         Request outside records:         ALLERGIES  Allergies   Allergen Reactions   • Latex Other (See Comments)     BLISTERS        PFSH:     The following portions of the patient's history were reviewed and updated as appropriate: Allergies / Current Medications / Past Medical History / Surgical History / Social History / Family History    PROBLEM LIST   Patient Active Problem List   Diagnosis   • Hypertension   • Mixed hyperlipidemia   • Obesity (BMI 30-39.9)   • Hyperglycemia   • Left inguinal hernia   • Reactive depression   • Suspected sleep apnea       PAST MEDICAL HISTORY  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 of 18   • Depression    • Diverticulosis    • Elevated PSA    • GERD (gastroesophageal reflux disease)    • Hyperlipidemia    • Hypertension        SURGICAL HISTORY  Past Surgical History:   Procedure Laterality Date   • COLONOSCOPY N/A 9/27/2017    Procedure: COLONOSCOPY into cecum and TI;  Surgeon: Braydon DHALIWAL MD;  Location: [facility] ENDOSCOPY;  Service:    • CRANIOTOMY  1963    age  of 18y ; blood clot    • ENDOSCOPY N/A 2017    Procedure: ESOPHAGOGASTRODUODENOSCOPY WITH BOPSIES;  Surgeon: Hunter Clayton MD;  [facility];  Service:    • ENDOSCOPY N/A 2017    Procedure: ESOPHAGOGASTRODUODENOSCOPY with biopsies;  Surgeon: Hunter Clayton MD;  [facility];  Service:    • HIP ARTHROPLASTY Right 2018   • INGUINAL HERNIA REPAIR Left 2020    Procedure: left INGUINAL HERNIA REPAIR LAPAROSCOPIC WITH DAVINCI ROBOT;  Surgeon: oDnavon Monae MD;  Location: [facility] MAIN OR;  Service: DaVinci;  Laterality: Left;   • KNEE ACL RECONSTRUCTION Right    • MOLE REMOVAL  2020   • TOTAL HIP ARTHROPLASTY Left 2018   • VASECTOMY         SOCIAL HISTORY  Social History     Socioeconomic History   • Marital status:      Spouse name: Surya* ()   • Number of children: 2   • Years of education: Not on file   • Highest education level: Not on file   Occupational History   • 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)   Tobacco Use   • Smoking status: Former Smoker     Years: 30.00     Types: Cigarettes     Last attempt to quit:      Years since quittin.7   • Smokeless tobacco: Never Used   • Tobacco comment: quit 20 years ago   Substance and Sexual Activity   • Alcohol use: Yes     Comment: 3-4 drinks/week   • Drug use: No   • Sexual activity: Not Currently   Social History Narrative    2019: Wife Surya*  from AML        FAMILY HISTORY  Family History   Problem Relation Age of Onset   • Lung cancer Mother         smoker;  age 64   • Anxiety disorder Mother    • Depression Mother    • Alcohol abuse Mother    • Heart attack Father 64   • Alcohol abuse Father    • Liver cancer Father    • Urolithiasis Other         all siblings   • Esophageal cancer Brother    • Depression Brother    • Hypertension Brother    • Hyperlipidemia Brother    • Diabetes Brother    • Hypertension Sister    • Hyperlipidemia Sister    • Colon cancer Neg Hx     • Prostate cancer Neg Hx    • Dementia Neg Hx    • Malig Hyperthermia Neg Hx          Examiner was wearing KN95 mask, face shield and exam gloves during the entire duration of the visit. Patient was masked the entire time.   Minimum social distance of 6 ft maintained entire visit except if physical contact was necessary as documented.     **Dragon Disclaimer:   Much of this encounter note is an electronic transcription/translation of spoken language to printed text. The electronic translation of spoken language may permit erroneous, or at times, nonsensical words or phrases to be inadvertently transcribed. Although I have reviewed the note for such errors, some may still exist.     Template created by Panda Seaman MD

## 2020-09-08 NOTE — PATIENT INSTRUCTIONS
** IMPORTANT MESSAGE FROM DR. CONKLIN **    In our office, your satisfaction is VERY important to us.     You may receive a survey from [employer] by mail or E-mail for you to provide feedback about your visit. This information is invaluable for me to know what we can do to improve our services.     I ask that you please take a few minutes to complete the survey and let us know how we are doing in serving your needs. (You may receive the survey more than once for multiple visits)    Thank You !    Dr. Conklin & Staff    _________________________________________________________________________________________________________________________      ** ADDITIONAL INSTRUCTION / REMINDERS FROM DR. CONKLIN **      Medicare Wellness  Personal Prevention Plan of Service     Date of Office Visit:  2020  Encounter Provider:  Eusebio Conklin MD  Place of Service:  [facility] PRIMARY CARE  Patient Name: Nicolás Black  :  1945    As part of the Medicare Wellness portion of your visit today, we are providing you with this personalized preventive plan of services (PPPS). This plan is based upon recommendations of the [place] Preventive Services Task Force (USPSTF) and the Advisory Committee on Immunization Practices (ACIP).    This lists the preventive care services that should be considered, and provides dates of when you are due. Items listed as completed are up-to-date and do not require any further intervention.      Age-Appropriate Screening Schedule:  (Refer to the list below for future screening recommendations based on patient's age, sex and/or medical conditions. Orders for these recommended tests are listed in the plan section. The patient has been provided with a written plan)    Health Maintenance Topics  Health Maintenance   Topic Date Due   • ZOSTER VACCINE (2 of 2) 2015   • INFLUENZA VACCINE  2020   • PROSTATE CANCER SCREENING  2020   • LIPID PANEL  03/10/2021   • MEDICARE  ANNUAL WELLNESS  09/08/2021   • TDAP/TD VACCINES (2 - Td) 10/01/2026   • COLONOSCOPY  09/27/2027   • HEPATITIS C SCREENING  Completed   • Pneumococcal Vaccine Once at 65 Years Old  Completed   • AAA SCREEN (ONE-TIME)  Completed       Health Maintenance Topics Due or Over-Due  Health Maintenance Due   Topic Date Due   • ZOSTER VACCINE (2 of 2) 05/13/2015   • INFLUENZA VACCINE  08/01/2020   • PROSTATE CANCER SCREENING  09/08/2020         ADDITIONAL RESOURCES:     For excellent information on senior health and wellness refer to the National [place] [employer] web-site at:    WWW.NIHSENIORHEALTH.GOV

## 2020-09-08 NOTE — PROGRESS NOTES
"09/08/2020      MEDICARE ANNUAL WELLNESS VISIT     Nicolás Black is a 74 y.o. male who presents for Depression; Hypertension; and Hyperlipidemia      Patient's general assessment of his health since a year ago:     - Compared to one year ago, he feels his physical health is about the same without significant change.    - Compared to one year ago, he feels his mental health is about the same without significant change.      HPI for other active medical problems:           * The required components of Health Risk Assessment (HRA) that were completed by the patient and/or my staff are contained within this note and in the scanned documents titled \"Health Risk Assessment\" within the media section of the patient's chart in [employer].       HISTORY    Recent Hospitalizations:    Recent hospitalization?:     If YES, location, date, and diagnoses:     · Location:   · Date:   · Principle Discharge Dx:   · Secondary Dx:       Patient Care Team:    Patient Care Team:  Eusebio Seaman MD as PCP - General (Internal Medicine)  Eusebio Seaman MD as PCP - Claims Attributed  Teodoro Ling MD as Consulting Physician (Orthopedic Surgery)      Allergies:  Latex    Medications:  Current Outpatient Medications   Medication Sig Dispense Refill   • acetaminophen (TYLENOL) 650 MG 8 hr tablet Take 650 mg by mouth Every 8 (Eight) Hours As Needed.     • amLODIPine (NORVASC) 10 MG tablet Take 1 tablet by mouth every night at bedtime. 30 tablet 2   • benazepril (LOTENSIN) 40 MG tablet Take 1 tablet by mouth Daily. 90 tablet 3   • citalopram (CeleXA) 10 MG tablet TAKE ONE TABLET BY MOUTH DAILY 30 tablet 5   • hydroCHLOROthiazide (HYDRODIURIL) 12.5 MG tablet TAKE ONE TABLET BY MOUTH DAILY (Patient taking differently: Take 12.5 mg by mouth Daily.) 90 tablet 3   • Multiple Vitamins-Minerals (MULTIVITAMIN ADULT PO) Take 1 tablet by mouth Daily.     • pantoprazole (PROTONIX) 40 MG EC tablet TAKE ONE TABLET BY MOUTH DAILY (Patient taking differently: " Take 40 mg by mouth Daily.) 30 tablet 11   • rosuvastatin (CRESTOR) 10 MG tablet Take 1 tablet by mouth Every Night. 90 tablet 3   • Selenium 200 MCG capsule Take 200 mcg by mouth Daily.     • doxazosin (CARDURA) 1 MG tablet Take 1 tablet by mouth Every Night. 30 tablet 3     No current facility-administered medications for this visit.         PFSH:     The following portions of the patient's history were reviewed and updated as appropriate: Allergies / Current Medications / Past Medical History / Surgical History / Social History / Family History    Problem List:  Patient Active Problem List   Diagnosis   • Hypertension   • Mixed hyperlipidemia   • Obesity (BMI 30-39.9)   • Hyperglycemia   • Left inguinal hernia   • Reactive depression   • Suspected sleep apnea       Past Medical History:  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 of 18   • Depression    • Diverticulosis    • Elevated PSA    • GERD (gastroesophageal reflux disease)    • Hyperlipidemia    • Hypertension        Past Surgical History:  Past Surgical History:   Procedure Laterality Date   • COLONOSCOPY N/A 9/27/2017    Procedure: COLONOSCOPY into cecum and TI;  Surgeon: Braydon DHALIWAL MD;  Location: [facility] ENDOSCOPY;  Service:    • CRANIOTOMY  1963    age of 18y ; blood clot    • ENDOSCOPY N/A 9/26/2017    Procedure: ESOPHAGOGASTRODUODENOSCOPY WITH BOPSIES;  Surgeon: Hunter Clayton MD;  Location: [facility] ENDOSCOPY;  Service:    • ENDOSCOPY N/A 12/21/2017    Procedure: ESOPHAGOGASTRODUODENOSCOPY with biopsies;  Surgeon: Hunter Clayton MD;  Location: [facility] ENDOSCOPY;  Service:    • HIP ARTHROPLASTY Right 02/27/2018   • INGUINAL HERNIA REPAIR Left 6/16/2020    Procedure: left INGUINAL HERNIA REPAIR LAPAROSCOPIC WITH DAVINCI ROBOT;  Surgeon: Donavon Monae MD;  Location: [facility] MAIN OR;  Service: DaVinci;  Laterality: Left;  "  • KNEE ACL RECONSTRUCTION Right    • MOLE REMOVAL  2020   • TOTAL HIP ARTHROPLASTY Left 2018   • VASECTOMY         Social History:  Social History     Socioeconomic History   • Marital status:      Spouse name: Surya* ()   • Number of children: 2   • Years of education: Not on file   • Highest education level: Not on file   Occupational History   • Occupation: 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)   Tobacco Use   • Smoking status: Former Smoker     Years: 30.00     Types: Cigarettes     Last attempt to quit:      Years since quittin.7   • Smokeless tobacco: Never Used   • Tobacco comment: quit 20 years ago   Substance and Sexual Activity   • Alcohol use: Yes     Comment: 3-4 drinks/week   • Drug use: No   • Sexual activity: Not Currently   Social History Narrative    2019: Wife Surya*  from AML        Family History:  Family History   Problem Relation Age of Onset   • Lung cancer Mother         smoker;  age 64   • Anxiety disorder Mother    • Depression Mother    • Alcohol abuse Mother    • Heart attack Father 64   • Alcohol abuse Father    • Liver cancer Father    • Urolithiasis Other         all siblings   • Esophageal cancer Brother    • Depression Brother    • Hypertension Brother    • Hyperlipidemia Brother    • Diabetes Brother    • Hypertension Sister    • Hyperlipidemia Sister    • Colon cancer Neg Hx    • Prostate cancer Neg Hx    • Dementia Neg Hx    • Malig Hyperthermia Neg Hx          PATIENT ASSESSMENT    Vitals:  /84   Pulse 95   Temp 98.1 °F (36.7 °C)   Ht 144.8 cm (57.01\")   Wt 90.7 kg (200 lb)   SpO2 98%   BMI 43.26 kg/m²   BP Readings from Last 3 Encounters:   20 170/84   20 145/84   20 158/84     Wt Readings from Last 3 Encounters:   20 90.7 kg (200 lb)   20 87.9 kg (193 lb 12.6 oz)   20 89 kg (196 lb 1.6 oz)      Body mass index is 43.26 kg/m².    There were no vitals filed for this visit.      Review of " Systems:    Review of Systems      Physical Exam:    Physical Exam      Reviewed Data:    Labs:   Lab Results   Component Value Date     06/09/2020    K 3.7 06/09/2020    CALCIUM 9.7 06/09/2020    AST 25 11/17/2019    ALT 39 11/17/2019    BUN 28 (H) 06/09/2020    CREATININE 1.34 (H) 06/09/2020    CREATININE 1.07 11/17/2019    CREATININE 0.99 09/12/2019    EGFRIFNONA 52 (L) 06/09/2020    EGFRIFAFRI 83 01/29/2019       Lab Results   Component Value Date     (H) 01/29/2019     (H) 07/24/2018     (H) 07/02/2018    HGBA1C 5.70 (H) 03/10/2020    HGBA1C 5.3 02/28/2018    HGBA1C 5.40 02/08/2018       Lab Results   Component Value Date     (H) 03/10/2020     (H) 01/29/2019     (H) 02/08/2018    HDL 47 03/10/2020    TRIG 208 (H) 03/10/2020    CHOLHDLRATIO 4.19 03/10/2020       Lab Results   Component Value Date    TSH 3.230 04/17/2019    FREET4 1.17 04/17/2019          Lab Results   Component Value Date    WBC 8.62 06/09/2020    HGB 15.9 06/09/2020    HGB 17.4 11/17/2019    HGB 17.7 09/12/2019     06/09/2020                   Lab Results   Component Value Date    PSA 2.330 06/08/2018       Imaging:          Medical Tests:          Screening for Glaucoma:  Previous screening for glaucoma?: Yes      Hearing Loss Screen:  Finger Rub Hearing Test (right ear): passed  Finger Rub Hearing Test (left ear): passed      Urinary Incontinence Screen:  Episodes of urinary incontinence? : No      Depression Screen:  PHQ-2/PHQ-9 Depression Screening 6/21/2019   Little interest or pleasure in doing things 0   Feeling down, depressed, or hopeless 0   Total Score 0   If you checked off any problems, how difficult have these problems made it for you to do your work, take care of things at home, or get along with other people? -        PHQ-2: N/A (Has diagnosis of depression and is on treatment)    PHQ-9: 0 (Negative screening for depression)       FUNCTIONAL, FALL RISK, & COGNITIVE  SCREENING (Components below):    DATA:    Functional & Cognitive Status 9/8/2020   Do you have difficulty preparing food and eating? No   Do you have difficulty bathing yourself, getting dressed or grooming yourself? No   Do you have difficulty using the toilet? No   Do you have difficulty moving around from place to place? No   Do you have trouble with steps or getting out of a bed or a chair? No   Current Diet Well Balanced Diet   Dental Exam Up to date   Eye Exam Up to date   Exercise (times per week) 3 times per week   Current Exercise Activities Include -   Do you need help using the phone?  No   Are you deaf or do you have serious difficulty hearing?  No   Do you need help with transportation? No   Do you need help shopping? No   Do you need help preparing meals?  No   Do you need help with housework?  No   Do you need help with laundry? No   Do you need help taking your medications? No   Do you need help managing money? No   Do you ever drive or ride in a car without wearing a seat belt? No   Have you felt unusual stress, anger or loneliness in the last month? No   Who do you live with? Alone   If you need help, do you have trouble finding someone available to you? No   Have you been bothered in the last four weeks by sexual problems? No   Do you have difficulty concentrating, remembering or making decisions? No         A) Assessment of Functional Ability:  (Assessment of ability to perform ADL's (showering/bathing, using toilet, dressing, feeding self, moving self around) and IADL's (use telephone, shop, prepare food, housekeep, do laundry, transport independently, take medications independently, and handle finances)    Degree of functional impairment: MILD (based on assessment noted above)      B) Assessment of Fall Risk:  Fall Risk Assessment was completed, and patient is at low risk for falls.       Need for further evaluation of gait, strength, and balance? : No    Timed Up and Go (TUG):   (>= 12 seconds  indicates high risk for falling)    Observable abnormalities included: Normal gait pattern       C. Assessment of Cognitive Function:    Mini-Cog Test:     1) Registration (3 objects): Yes   2) Number of objects recalled: 3   3) Clock Draw: Passed? : N/A       Further evaluation required? : No      COUNSELING    A. Identification of Health Risk Factors:    Risk factors include: cardiovascular risk factors, polypharmacy, depression / other psychiatric problems and obesity      B. Age-Appropriate Screening Schedule:  (Refer to the list below for future screening recommendations based on patient's age, sex and/or medical conditions. Orders for these recommended tests are listed in the plan section. The patient has been provided with a written plan)    Health Maintenance Topics  Health Maintenance   Topic Date Due   • ZOSTER VACCINE (2 of 2) 05/13/2015   • INFLUENZA VACCINE  08/01/2020   • PROSTATE CANCER SCREENING  09/08/2020   • LIPID PANEL  03/10/2021   • TDAP/TD VACCINES (2 - Td) 10/01/2026   • COLONOSCOPY  09/27/2027       Health Maintenance Topics Due or Over-Due  Health Maintenance Due   Topic Date Due   • ZOSTER VACCINE (2 of 2) 05/13/2015   • INFLUENZA VACCINE  08/01/2020   • PROSTATE CANCER SCREENING  09/08/2020         C. Advanced Care Planning:    Advance Care Planning   ACP discussion was held with the patient during this visit. Patient has an advance directive in EMR which is still valid.        D. Patient Self-Management and Personalized Health Advice:    He has been provided with personalized counseling/information (including brochures/handouts) about:     -- optimizing diet/nutrition plans, improving exercise / conditioning, weight management, alcohol use, reducing risk for cardiovascular disease (heart, stroke, vascular), mental health concerns (depression/anxiety), managing depression/anxiety, polypharmacy concerns, side effects of medications and management of sleep disorders      He has been  recommended for the following preventative services which has been performed today, will be ordered today or ordered/performed on upcoming follow-up visit:     -- nutrition counseling provided, exercise counseling provided, counseling for cardiovascular disease risk reduction, fall risk assessment / plan of care completed, urinary incontinence assessment done, screening for diabetes performed (current/reviewed labs/lab ordered), screening for prostate cancer (discussed and PSA will be performed annually), vaccination for influenza administered/recommended, vaccination for Shingrix administered  (or recommended at pharmacy), vaccination for Hepatitis A administered (or recommended at pharmacy)      E. Miscellaneous Items:    -Aspirin use counseling: Does not need ASA (and currently is not on it)    -Discussed BMI with him. The BMI is above average; BMI management plan is completed (discussed plans for weight loss)    -Reviewed use of high risk medication in the elderly: YES    -Reviewed for potential of harmful drug interactions in the elderly: YES      IV. WRAP-UP    Assessment & Plan:    1) MEDICARE ANNUAL WELLNESS VISIT    2) OTHER MEDICAL CONDITIONS ADDRESSED TODAY:            Problem List Items Addressed This Visit        High    Hypertension - Primary (Chronic)    Current Assessment & Plan     Uncontrolled/worse. Start doxazosin 1 mg qHS. Take amlodipine 10 mg at bedtime. Continue HCTZ 12.5 mg and benazepril 40 mg qd. Send BP readings in 1 month.     BP Readings from Last 3 Encounters:   09/08/20 170/84   06/16/20 145/84   06/09/20 158/84             Relevant Medications    hydroCHLOROthiazide (HYDRODIURIL) 12.5 MG tablet    benazepril (LOTENSIN) 40 MG tablet    amLODIPine (NORVASC) 10 MG tablet    doxazosin (CARDURA) 1 MG tablet    Other Relevant Orders    Ambulatory Referral to Sleep Medicine    TSH+Free T4    Comprehensive Metabolic Panel       Medium    Mixed hyperlipidemia (Chronic)    Overview      Continue rosuvastatin 10 mg qd.          Relevant Medications    rosuvastatin (CRESTOR) 10 MG tablet    Other Relevant Orders    Comprehensive Metabolic Panel    Reactive depression (Chronic)    Current Assessment & Plan     Continue citalopram 10 mg qd.          Relevant Medications    citalopram (CeleXA) 10 MG tablet       Low    Hyperglycemia (Chronic)    Current Assessment & Plan     Lab Results   Component Value Date     (H) 01/29/2019     (H) 07/24/2018     (H) 07/02/2018     Lab Results   Component Value Date    HGBA1C 5.70 (H) 03/10/2020    HGBA1C 5.3 02/28/2018    HGBA1C 5.40 02/08/2018      Maintain a low sugar/starch/carbohydrate diet and exercise regularly. Wt loss advised.  Minimize any alcohol.          Relevant Orders    Hemoglobin A1c    Suspected sleep apnea    Current Assessment & Plan     Sleep medicine referral.          Relevant Orders    Ambulatory Referral to Sleep Medicine      Other Visit Diagnoses     Medicare annual wellness visit, subsequent        Encounter for screening for malignant neoplasm of prostate        Relevant Orders    PSA Screen                    Orders Placed This Encounter   Procedures   • Fluad Quad 65+ yrs (9232-4899)   • PSA Screen   • Hemoglobin A1c   • TSH+Free T4   • Comprehensive Metabolic Panel   • Ambulatory Referral to Sleep Medicine       Discussion / Summary:        Medications as of TODAY:              Current Outpatient Medications   Medication Sig Dispense Refill   • acetaminophen (TYLENOL) 650 MG 8 hr tablet Take 650 mg by mouth Every 8 (Eight) Hours As Needed.     • amLODIPine (NORVASC) 10 MG tablet Take 1 tablet by mouth every night at bedtime. 30 tablet 2   • benazepril (LOTENSIN) 40 MG tablet Take 1 tablet by mouth Daily. 90 tablet 3   • citalopram (CeleXA) 10 MG tablet TAKE ONE TABLET BY MOUTH DAILY 30 tablet 5   • hydroCHLOROthiazide (HYDRODIURIL) 12.5 MG tablet TAKE ONE TABLET BY MOUTH DAILY (Patient taking differently: Take  12.5 mg by mouth Daily.) 90 tablet 3   • Multiple Vitamins-Minerals (MULTIVITAMIN ADULT PO) Take 1 tablet by mouth Daily.     • pantoprazole (PROTONIX) 40 MG EC tablet TAKE ONE TABLET BY MOUTH DAILY (Patient taking differently: Take 40 mg by mouth Daily.) 30 tablet 11   • rosuvastatin (CRESTOR) 10 MG tablet Take 1 tablet by mouth Every Night. 90 tablet 3   • Selenium 200 MCG capsule Take 200 mcg by mouth Daily.     • doxazosin (CARDURA) 1 MG tablet Take 1 tablet by mouth Every Night. 30 tablet 3     No current facility-administered medications for this visit.          FOLLOW-UP:            Return in about 2 months (around 11/8/2020) for Hypertension.                 Future Appointments   Date Time Provider Department Center   9/10/2020 10:20 AM [facility] None   9/17/2020  1:45 PM Sierra Cerda APRN [facility] None   11/10/2020  2:30 PM Eusebio Seaman MD [facility] None         ______________________________________________________________________      A printed After Visit Summary (AVS) including the Personalized Prevention  Plan Services (PPPS) was given to the patient at check-out today.       Examiner was wearing KN95 mask, face shield and exam gloves during the entire duration of the visit. Patient was masked the entire time.   Minimum social distance of 6 ft maintained entire visit except if physical contact was necessary as documented.     **Dragon Disclaimer:   Much of this encounter note is an electronic transcription/translation of spoken language to printed text. The electronic translation of spoken language may permit erroneous, or at times, nonsensical words or phrases to be inadvertently transcribed. Although I have reviewed the note for such errors, some may still exist.     This template was created by Panda Seaman MD.

## 2020-09-10 LAB
ALBUMIN SERPL-MCNC: 5.2 G/DL (ref 3.5–5.2)
ALBUMIN/GLOB SERPL: 2.5 G/DL
ALP SERPL-CCNC: 83 U/L (ref 39–117)
ALT SERPL-CCNC: 51 U/L (ref 1–41)
AST SERPL-CCNC: 38 U/L (ref 1–40)
BILIRUB SERPL-MCNC: 0.6 MG/DL (ref 0–1.2)
BUN SERPL-MCNC: 20 MG/DL (ref 8–23)
BUN/CREAT SERPL: 18.5 (ref 7–25)
CALCIUM SERPL-MCNC: 9.9 MG/DL (ref 8.6–10.5)
CHLORIDE SERPL-SCNC: 100 MMOL/L (ref 98–107)
CO2 SERPL-SCNC: 25.9 MMOL/L (ref 22–29)
CREAT SERPL-MCNC: 1.08 MG/DL (ref 0.76–1.27)
GLOBULIN SER CALC-MCNC: 2.1 GM/DL
GLUCOSE SERPL-MCNC: 148 MG/DL (ref 65–99)
HBA1C MFR BLD: 5.6 % (ref 4.8–5.6)
POTASSIUM SERPL-SCNC: 4 MMOL/L (ref 3.5–5.2)
PROT SERPL-MCNC: 7.3 G/DL (ref 6–8.5)
PSA SERPL-MCNC: 6.42 NG/ML (ref 0–4)
SODIUM SERPL-SCNC: 140 MMOL/L (ref 136–145)
T4 FREE SERPL-MCNC: 1.41 NG/DL (ref 0.93–1.7)
TSH SERPL DL<=0.005 MIU/L-ACNC: 3.78 UIU/ML (ref 0.27–4.2)

## 2020-09-11 ENCOUNTER — DOCUMENTATION (OUTPATIENT)
Dept: INTERNAL MEDICINE | Age: 75
End: 2020-09-11

## 2020-09-11 DIAGNOSIS — R97.20 ELEVATED PSA: Primary | ICD-10-CM

## 2020-09-11 NOTE — PROGRESS NOTES
Call patient with results:    1. PSA is elevated. Recheck PSA (total/free) in 3 months. This is IMPORTANT. Elevated PSA may be suggestive of prostate inflammation, enlarged prostate or prostate cancer     2 A1c level for blood sugar average is stable    3. Thyroid is stable     See me in 3 months for elevated PSA. Be sure to schedule labs 1 week prior to followup.

## 2020-09-12 NOTE — ASSESSMENT & PLAN NOTE
Recheck PSA in 3 months.     Lab Results   Component Value Date    PSA 6.420 (H) 09/10/2020    PSA 2.330 06/08/2018

## 2020-09-17 ENCOUNTER — OFFICE VISIT (OUTPATIENT)
Dept: SLEEP MEDICINE | Facility: HOSPITAL | Age: 75
End: 2020-09-17

## 2020-09-17 VITALS
BODY MASS INDEX: 34.49 KG/M2 | DIASTOLIC BLOOD PRESSURE: 81 MMHG | OXYGEN SATURATION: 95 % | SYSTOLIC BLOOD PRESSURE: 168 MMHG | HEIGHT: 64 IN | HEART RATE: 88 BPM | WEIGHT: 202 LBS

## 2020-09-17 DIAGNOSIS — R29.818 SUSPECTED SLEEP APNEA: ICD-10-CM

## 2020-09-17 DIAGNOSIS — I10 ESSENTIAL HYPERTENSION: Chronic | ICD-10-CM

## 2020-09-17 DIAGNOSIS — R06.83 SNORING: ICD-10-CM

## 2020-09-17 DIAGNOSIS — G47.10 HYPERSOMNIA: Primary | ICD-10-CM

## 2020-09-17 PROCEDURE — G0463 HOSPITAL OUTPT CLINIC VISIT: HCPCS

## 2020-09-17 NOTE — PROGRESS NOTES
[facility]  Telephone: [phone] / Fax: [phone] [place]  Telephone: [phone] / Fax: [phone] [place]    Referring Physician: Eusebio Seaman MD  PCP: Eusebio Seaman MD    Reason for consult:  sleep apnea    Nicolás Black is a 75 y.o.male  was seen in the [facility] Disorders Center today for evaluation of sleep apnea. He is being referred for SARAH evaluation in view of persistently elevated BP in the past year. He reports history of snoring but no associated apneas or gasping respirations. In the past 5 years he gained 20 lbs.  He denies history of stroke/MI.  He has been dealing with depression/grief after wife passed away 1 year. He is unaware of witnessed apneas as he sleeps alone. He sleeps from 9pm-9am. He falls asleep within 5-15 minutes. He takes a nap in the middle of the afternoon to feel more refreshed despite getting 12 hours of sleep per night.     SH- retired, former smoker age [age], drinks 1 caffeine per day, 1 alcohol per day.    ROS- +myalgias, +swollen ankles/legs, +depression. Rest is negative.    Nicolás Black  has a past medical history of Arthritis, Asthma, Broken legs, Deep vein thrombosis (CMS/HCC), Depression, Diverticulosis, Elevated PSA, GERD (gastroesophageal reflux disease), Hyperlipidemia, and Hypertension.    Current Medications:    Current Outpatient Medications:   •  acetaminophen (TYLENOL) 650 MG 8 hr tablet, Take 650 mg by mouth Every 8 (Eight) Hours As Needed., Disp: , Rfl:   •  amLODIPine (NORVASC) 10 MG tablet, Take 1 tablet by mouth every night at bedtime., Disp: 30 tablet, Rfl: 2  •  benazepril (LOTENSIN) 40 MG tablet, Take 1 tablet by mouth Daily., Disp: 90 tablet, Rfl: 3  •  citalopram (CeleXA) 10 MG tablet, TAKE ONE TABLET BY MOUTH DAILY, Disp: 30 tablet, Rfl: 5  •  doxazosin (CARDURA) 1 MG tablet, Take 1 tablet by mouth Every Night., Disp: 30 tablet, Rfl: 3  •  hydroCHLOROthiazide (HYDRODIURIL) 12.5 MG tablet, TAKE ONE TABLET BY  "MOUTH DAILY (Patient taking differently: Take 12.5 mg by mouth Daily.), Disp: 90 tablet, Rfl: 3  •  Multiple Vitamins-Minerals (MULTIVITAMIN ADULT PO), Take 1 tablet by mouth Daily., Disp: , Rfl:   •  pantoprazole (PROTONIX) 40 MG EC tablet, TAKE ONE TABLET BY MOUTH DAILY (Patient taking differently: Take 40 mg by mouth Daily.), Disp: 30 tablet, Rfl: 11  •  rosuvastatin (CRESTOR) 10 MG tablet, Take 1 tablet by mouth Every Night., Disp: 90 tablet, Rfl: 3  •  Selenium 200 MCG capsule, Take 200 mcg by mouth Daily., Disp: , Rfl:     I have reviewed Past Medical History, Past Surgical History, Medication List, Social History and Family History as entered in Sleep Questionnaire and EPIC.    ESS  5   Vital Signs /81   Pulse 88   Ht 162.6 cm (64\")   Wt 91.6 kg (202 lb)   SpO2 95%   BMI 34.67 kg/m²  Body mass index is 34.67 kg/m².    General Alert and oriented. No acute distress noted   Pharynx/Throat Class IV Mallampati airway, large tongue, no evidence of redundant lateral pharyngeal tissue. No oral lesions. No thrush. Moist mucous membranes.   Head Normocephalic. Symmetrical. Atraumatic.    Nose No septal deviation. No drainage   Chest Wall Normal shape. Symmetric expansion with respiration. No tenderness.   Neck Trachea midline, no thyromegaly or adenopathy    Lungs Clear to auscultation bilaterally. No wheezes. No rhonchi. No rales. Respirations regular, even and unlabored.   Heart Regular rhythm and normal rate. Normal S1 and S2. No murmur   Abdomen Soft, non-tender and non-distended. Normal bowel sounds. No masses.   Extremities Moves all extremities well. No edema   Psychiatric Normal mood and affect.        Impression:  1. Hypersomnia    2. Essential hypertension    3. Suspected sleep apnea    4. Snoring          Plan:  I discussed the pathophysiology of obstructive sleep apnea with the patient.  We discussed the adverse outcomes associated with untreated sleep-disordered breathing.  We discussed " treatment modalities of obstructive sleep apnea including CPAP device. Sleep study will be scheduled to establish a definitive diagnosis of sleep disorder breathing.  Weight loss will be strongly beneficial in order to reduce the severity of sleep-disordered breathing.  Patient has narrow oropharyngeal structure.  Caution during activities that require prolonged concentration is strongly advised.  After sleep study results are available, patient will be notified, and appointment will be scheduled to discuss sleep study results and treatment recommendations.      I appreciate the opportunity to participate in this patient's care.      DOROTA Rivero  [facility]  Phone: [phone]      Part of this note may be an electronic transcription/translation of spoken language to printed text using the Dragon Dictation System. Some errors may exist even though the document was edited.

## 2020-09-24 DIAGNOSIS — I10 ESSENTIAL HYPERTENSION: Primary | ICD-10-CM

## 2020-09-24 RX ORDER — METOPROLOL SUCCINATE 50 MG/1
50 TABLET, EXTENDED RELEASE ORAL NIGHTLY
Qty: 30 TABLET | Refills: 3 | Status: SHIPPED | OUTPATIENT
Start: 2020-09-24 | End: 2020-10-09 | Stop reason: SDUPTHER

## 2020-10-09 ENCOUNTER — DOCUMENTATION (OUTPATIENT)
Dept: INTERNAL MEDICINE | Age: 75
End: 2020-10-09

## 2020-10-09 DIAGNOSIS — I10 ESSENTIAL HYPERTENSION: ICD-10-CM

## 2020-10-09 RX ORDER — METOPROLOL SUCCINATE 50 MG/1
50 TABLET, EXTENDED RELEASE ORAL 2 TIMES DAILY
Qty: 60 TABLET | Refills: 2 | Status: SHIPPED | OUTPATIENT
Start: 2020-10-09 | End: 2021-01-11

## 2020-10-09 NOTE — TELEPHONE ENCOUNTER
Pt notified via phone and [facility] of dosage increase and when to send in B/P readings. Pt instructed to call office with further questions. MM

## 2020-10-09 NOTE — TELEPHONE ENCOUNTER
----- Message from Eusebio Seaman MD sent at 10/9/2020 12:48 PM EDT -----  Regarding: FW: Test Results Question  Contact: [phone]      ----- Message -----  From: Amanda Friedman LPN  Sent: 10/9/2020  12:42 PM EDT  To: Eusebio Seaman MD  Subject: FW: Test Results Question                          ----- Message -----  From: Nicolás Black  Sent: 10/9/2020  12:38 PM EDT  To: Jodie  [id number]  Subject: Test Results Question                            Blood pressure

## 2020-10-09 NOTE — ASSESSMENT & PLAN NOTE
PLAN UPDATE by phone via MA (Verify compliance on follow-up visit):     Home blood pressure is high with addition of doxazosin previously.   Increase metoprolol succinate 50 mg to BID.   Send blood pressure in 2 weeks.

## 2020-10-12 ENCOUNTER — HOSPITAL ENCOUNTER (OUTPATIENT)
Dept: SLEEP MEDICINE | Facility: HOSPITAL | Age: 75
Discharge: HOME | End: 2020-10-12
Admitting: NURSE PRACTITIONER

## 2020-10-12 DIAGNOSIS — I10 ESSENTIAL HYPERTENSION: ICD-10-CM

## 2020-10-12 DIAGNOSIS — R06.83 SNORING: ICD-10-CM

## 2020-10-12 DIAGNOSIS — G47.10 HYPERSOMNIA: ICD-10-CM

## 2020-10-12 PROCEDURE — 95806 SLEEP STUDY UNATT&RESP EFFT: CPT

## 2020-10-22 ENCOUNTER — TELEPHONE (OUTPATIENT)
Dept: SLEEP MEDICINE | Facility: HOSPITAL | Age: 75
End: 2020-10-22

## 2020-10-22 NOTE — TELEPHONE ENCOUNTER
LM scheduling a follow up with Sierra on 10/29/2020 at 2:30pm.  Pt advised to call back if he could not keep that appointment.

## 2020-11-02 ENCOUNTER — RESULTS ENCOUNTER (OUTPATIENT)
Dept: INTERNAL MEDICINE | Age: 75
End: 2020-11-02

## 2020-11-02 DIAGNOSIS — R97.20 ELEVATED PSA: ICD-10-CM

## 2020-11-12 ENCOUNTER — DOCUMENTATION (OUTPATIENT)
Dept: SLEEP MEDICINE | Facility: HOSPITAL | Age: 75
End: 2020-11-12

## 2020-11-12 ENCOUNTER — APPOINTMENT (OUTPATIENT)
Dept: SLEEP MEDICINE | Facility: HOSPITAL | Age: 75
End: 2020-11-12

## 2020-11-12 ENCOUNTER — TELEPHONE (OUTPATIENT)
Dept: SLEEP MEDICINE | Facility: HOSPITAL | Age: 75
End: 2020-11-12

## 2020-11-12 NOTE — TELEPHONE ENCOUNTER
----- Message from DOROTA Rivero sent at 11/12/2020  4:04 PM EST -----  2 attempts were made to call patient to perform a tele-health visit today. I left 2 messages.    Let's reschedule his visit.  He cancelled his f/u 10/29/20 too.    Cecilia

## 2020-11-12 NOTE — TELEPHONE ENCOUNTER
----- Message from Sonya Allen sent at 11/12/2020  4:32 PM EST -----  LM WITH AN APPT TIME AND DATE, MAILED LETTER  ----- Message -----  From: Sierra Cerda APRN  Sent: 11/12/2020   4:04 PM EST  To: Sonya Allen    2 attempts were made to call patient to perform a tele-health visit today. I left 2 messages.    Let's reschedule his visit.  He cancelled his f/u 10/29/20 too.    Sierra.

## 2020-11-12 NOTE — PROGRESS NOTES
wayne Black  Documented: 2020 4:43 PM  Location: [facility]  Patient #: [id number]  : 1945   / Language: Other / Race: White  Male      History of Present Illness (Sierra RAYA; 2020 4:47 PM)    The patient is a 75 year old male who presents with a sleep disorder. Phone encounter-[facility] who is scheduled to review sleep study results and discuss treatment.    I placed a call at 1:20pm(left v/m to call me) and also at 2:10 pm(left v/m to call me).    Patient called me back at 4:35pm.    In view of snoring and elevated BP we did HST on 10/13/20. Study showed very severe SARAH with AHI 54 and supine AHI 62. Lowest desaturation was 84% and he spent 60 min with sats <89%.    I reviewed SARAH pathophysiology and sleep study results with patient. We discussed adverse outcomes associated with untreated SARAH. I explained  how CPAP works to correct SARAH. After our discussion he agrees to start treatment. I will ask our staff to set up auto CPAP 5-20 cm through local DME and do overnight oximetry on CPAP RA few weeks after set up. He will be tried on dreamwear wisp mask. Patient will return for f/u in 6-8 wks to see Dr. Candelaria. Patient was advised to call if does not hear from DME in next 7-10 days. I asked patient to use the machine at least 4 hours on at least 70% of the nights. Patient was advised to contact DME if he/she experiences any mask issues in first 30 days.      Assessment & Plan (Sierra RAYA; 2020 4:51 PM)    SARAH (obstructive sleep apnea) (G47.33)  Impression: Arrange auto CPAP and ovn oximetry through the sleep lab.    RTC in 6-8 weeks to see Dr Candelaria.        Signed by DOROTA Rivero (2020 4:51 PM)

## 2020-11-13 ENCOUNTER — TELEPHONE (OUTPATIENT)
Dept: SLEEP MEDICINE | Facility: HOSPITAL | Age: 75
End: 2020-11-13

## 2020-11-13 NOTE — TELEPHONE ENCOUNTER
----- Message from Kirsten Musa sent at 11/13/2020  2:26 PM EST -----  Done   ----- Message -----  From: Sierra Cerda APRN  Sent: 11/12/2020   4:55 PM EST  To: Kirsten Musa, Jennifer Avina, [employer] Rep    Please set up auto CPAP 5-20cm with cristo ramires, ovn oximety on CPAP few weeks after set up and f/u appt in 8 weeks with Dr Candelaria.    Phone visit done today.

## 2020-11-13 NOTE — TELEPHONE ENCOUNTER
LV requesting patient to call back if he has a preferred DME ( sending to [facility] unless requests otherwise ) and I held an appointment on 1/22/21 @1015, if that is not a good time , please call back and we can reschedule.

## 2020-11-20 ENCOUNTER — APPOINTMENT (OUTPATIENT)
Dept: SLEEP MEDICINE | Facility: HOSPITAL | Age: 75
End: 2020-11-20

## 2020-11-26 LAB
PSA FREE MFR SERPL: 36.7 %
PSA FREE SERPL-MCNC: 2.02 NG/ML
PSA SERPL-MCNC: 5.5 NG/ML (ref 0–4)

## 2020-12-02 DIAGNOSIS — I10 ESSENTIAL HYPERTENSION: Chronic | ICD-10-CM

## 2020-12-02 RX ORDER — AMLODIPINE BESYLATE 10 MG/1
TABLET ORAL
Qty: 30 TABLET | Refills: 11 | Status: SHIPPED | OUTPATIENT
Start: 2020-12-02 | End: 2022-03-21

## 2020-12-03 ENCOUNTER — OFFICE VISIT (OUTPATIENT)
Dept: INTERNAL MEDICINE | Age: 75
End: 2020-12-03

## 2020-12-03 VITALS
HEIGHT: 64 IN | DIASTOLIC BLOOD PRESSURE: 76 MMHG | BODY MASS INDEX: 34.83 KG/M2 | WEIGHT: 204 LBS | SYSTOLIC BLOOD PRESSURE: 162 MMHG | TEMPERATURE: 97.1 F | HEART RATE: 68 BPM | OXYGEN SATURATION: 98 %

## 2020-12-03 DIAGNOSIS — I10 ESSENTIAL HYPERTENSION: Primary | Chronic | ICD-10-CM

## 2020-12-03 DIAGNOSIS — G47.33 OSA (OBSTRUCTIVE SLEEP APNEA): Chronic | ICD-10-CM

## 2020-12-03 DIAGNOSIS — R97.20 INCREASED PROSTATE SPECIFIC ANTIGEN (PSA) VELOCITY: Chronic | ICD-10-CM

## 2020-12-03 PROBLEM — R29.818 SUSPECTED SLEEP APNEA: Chronic | Status: ACTIVE | Noted: 2020-09-08

## 2020-12-03 PROCEDURE — 99214 OFFICE O/P EST MOD 30 MIN: CPT | Performed by: INTERNAL MEDICINE

## 2020-12-03 RX ORDER — DOXAZOSIN 2 MG/1
2 TABLET ORAL NIGHTLY
Qty: 30 TABLET | Refills: 5 | Status: SHIPPED | OUTPATIENT
Start: 2020-12-03 | End: 2021-06-22

## 2020-12-03 NOTE — PROGRESS NOTES
"[facility]  FLORIAN CONKLIN M.D.      Nicolás DONALDSON Wayne / 75 y.o. / male  12/03/2020    CHIEF COMPLAINT     Hypertension and Elevated PSA      ASSESSMENT & PLAN     Problem List Items Addressed This Visit        High    Hypertension - Primary (Chronic)    Current Assessment & Plan     Still high but is about to get on CPAP for SARAH.   Will increase doxazosin to 2 mg qHS (also helping with his urination).     BP Readings from Last 3 Encounters:   12/03/20 162/76   09/17/20 168/81   09/08/20 170/84             Relevant Medications    hydroCHLOROthiazide (HYDRODIURIL) 12.5 MG tablet    benazepril (LOTENSIN) 40 MG tablet    metoprolol succinate XL (TOPROL-XL) 50 MG 24 hr tablet    amLODIPine (NORVASC) 10 MG tablet    doxazosin (CARDURA) 2 MG tablet       Medium    SARAH (obstructive sleep apnea) (Chronic)    Current Assessment & Plan     Just received CPAP machine. To start using CPAP.          Increased prostate specific antigen (PSA) velocity (Chronic)    Current Assessment & Plan     Lab Results   Component Value Date    PSA 5.5 (H) 11/25/2020    PSA 6.420 (H) 09/10/2020    PSA 2.330 06/08/2018      Still elevated. I recommended seeing a urologist but he would prefer to recheck in 4 months and decide then.     Starting doxazosin for blood pressure in September seems to help his urination symptoms.              No orders of the defined types were placed in this encounter.    New Medications Ordered This Visit   Medications   • doxazosin (CARDURA) 2 MG tablet     Sig: Take 1 tablet by mouth Every Night.     Dispense:  30 tablet     Refill:  5       Summary/Discussion:  •     Next Appointment with me: 4/2/2021    Return in about 4 months (around 4/3/2021) for Hypertension and elevated PSA velocity.    _____________________________________________________________________________________    VITAL SIGNS     Visit Vitals  /76   Pulse 68   Temp 97.1 °F (36.2 °C)   Ht 162.6 cm (64\")   Wt 92.5 kg (204 lb)   SpO2 98% "   BMI 35.02 kg/m²       BP Readings from Last 3 Encounters:   12/03/20 162/76   09/17/20 168/81   09/08/20 170/84     Wt Readings from Last 3 Encounters:   12/03/20 92.5 kg (204 lb)   09/17/20 91.6 kg (202 lb)   09/08/20 90.7 kg (200 lb)     Body mass index is 35.02 kg/m².      MEDICATIONS     Current Outpatient Medications   Medication Sig Dispense Refill   • acetaminophen (TYLENOL) 650 MG 8 hr tablet Take 650 mg by mouth Every 8 (Eight) Hours As Needed.     • amLODIPine (NORVASC) 10 MG tablet TAKE ONE TABLET BY MOUTH DAILY 30 tablet 11   • benazepril (LOTENSIN) 40 MG tablet Take 1 tablet by mouth Daily. 90 tablet 3   • doxazosin (CARDURA) 1 MG tablet Take 1 tablet by mouth Every Night. 30 tablet 5   • hydroCHLOROthiazide (HYDRODIURIL) 12.5 MG tablet TAKE ONE TABLET BY MOUTH DAILY (Patient taking differently: Take 12.5 mg by mouth Daily.) 90 tablet 3   • metoprolol succinate XL (TOPROL-XL) 50 MG 24 hr tablet Take 1 tablet by mouth 2 (Two) Times a Day. 60 tablet 2   • Multiple Vitamins-Minerals (MULTIVITAMIN ADULT PO) Take 1 tablet by mouth Daily.     • pantoprazole (PROTONIX) 40 MG EC tablet TAKE ONE TABLET BY MOUTH DAILY (Patient taking differently: Take 40 mg by mouth Daily.) 30 tablet 11   • rosuvastatin (CRESTOR) 10 MG tablet Take 1 tablet by mouth Every Night. 90 tablet 3   • Selenium 200 MCG capsule Take 200 mcg by mouth Daily.     • citalopram (CeleXA) 10 MG tablet TAKE ONE TABLET BY MOUTH DAILY 30 tablet 5     No current facility-administered medications for this visit.        _____________________________________________________________________________________    HISTORY OF PRESENT ILLNESS     Abnormal PSA velocity: starting doxazosin for hypertension seems to help with urine flow.   Lab Results   Component Value Date    PSA 5.5 (H) 11/25/2020    PSA 6.420 (H) 09/10/2020    PSA 2.330 06/08/2018      Hypertension: blood pressure remains high here and home. 10/9 increased metoprolol succinate to 50 mg BID.  In September started doxazosin 1 mg qHS. Has not been monitoring sodium intake.     Severe SARAH on sleep study, to start using CPAP.       Patient Care Team:  Eusebio Seaman MD as PCP - General (Internal Medicine)  Teodoro Ling MD as Consulting Physician (Orthopedic Surgery)      REVIEW OF SYSTEMS     Review of Systems  Constitutional neg except per HPI   Resp neg for cough or shortness of breath ; + SARAH   CV neg chest pain, edema or palpitations    improved urine flow   Neuro neg headaches        PHYSICAL EXAMINATION     Physical Exam  Obese   Alert with normal thought and judgment   Trace bilateral lower extremities edema     REVIEWED DATA     Labs:     Lab Results   Component Value Date     09/10/2020    K 4.0 09/10/2020    CALCIUM 9.9 09/10/2020    AST 38 09/10/2020    ALT 51 (H) 09/10/2020    BUN 20 09/10/2020    CREATININE 1.08 09/10/2020    CREATININE 1.34 (H) 06/09/2020    CREATININE 1.07 11/17/2019    EGFRIFNONA 67 09/10/2020    EGFRIFAFRI 81 09/10/2020       Lab Results   Component Value Date    HGBA1C 5.60 09/10/2020    HGBA1C 5.70 (H) 03/10/2020    HGBA1C 5.3 02/28/2018     (H) 09/10/2020     (H) 01/29/2019     (H) 07/24/2018       Lab Results   Component Value Date     (H) 03/10/2020     (H) 01/29/2019     (H) 02/08/2018    HDL 47 03/10/2020    HDL 41 01/29/2019    HDL 45 02/08/2018    TRIG 208 (H) 03/10/2020    TRIG 185 (H) 01/29/2019    TRIG 280 (H) 02/08/2018    CHOLHDLRATIO 4.19 03/10/2020    CHOLHDLRATIO 4.37 01/29/2019    CHOLHDLRATIO 4.49 02/08/2018       Lab Results   Component Value Date    TSH 3.780 09/10/2020    FREET4 1.41 09/10/2020       Lab Results   Component Value Date    WBC 8.62 06/09/2020    HGB 15.9 06/09/2020    HGB 17.4 11/17/2019    HGB 17.7 09/12/2019     06/09/2020       Lab Results   Component Value Date    GLUCOSEU Negative 11/17/2019    BLOODU Negative 11/17/2019    NITRITEU Negative 11/17/2019    LEUKOCYTESUR  Negative 11/17/2019       Imaging:           Medical Tests:           Summary of old records / correspondence / consultant report:           Request outside records:           ALLERGIES  Allergies   Allergen Reactions   • Latex Other (See Comments)     BLISTERS        PFSH:     The following portions of the patient's history were reviewed and updated as appropriate: Allergies / Current Medications / Past Medical History / Surgical History / Social History / Family History    PROBLEM LIST   Patient Active Problem List   Diagnosis   • Hypertension   • Mixed hyperlipidemia   • Obesity (BMI 30-39.9)   • Hyperglycemia   • Left inguinal hernia   • Reactive depression   • SARAH (obstructive sleep apnea)   • Increased prostate specific antigen (PSA) velocity       PAST MEDICAL HISTORY  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 of 18   • Depression    • Diverticulosis    • Elevated PSA    • GERD (gastroesophageal reflux disease)    • Hyperlipidemia    • Hypertension        SURGICAL HISTORY  Past Surgical History:   Procedure Laterality Date   • COLONOSCOPY N/A 9/27/2017    Procedure: COLONOSCOPY into cecum and TI;  Surgeon: Braydon DHALIWAL MD;  Location: [facility] ENDOSCOPY;  Service:    • CRANIOTOMY  1963    age of 18y ; blood clot    • ENDOSCOPY N/A 9/26/2017    Procedure: ESOPHAGOGASTRODUODENOSCOPY WITH BOPSIES;  Surgeon: Hunter Clayton MD;  Location: [facility] ENDOSCOPY;  Service:    • ENDOSCOPY N/A 12/21/2017    Procedure: ESOPHAGOGASTRODUODENOSCOPY with biopsies;  Surgeon: Hunter Clayton MD;  Location: [facility] ENDOSCOPY;  Service:    • HIP ARTHROPLASTY Right 02/27/2018   • INGUINAL HERNIA REPAIR Left 6/16/2020    Procedure: left INGUINAL HERNIA REPAIR LAPAROSCOPIC WITH DAVINCI ROBOT;  Surgeon: Donavon Monae MD;  Location: [facility] MAIN OR;  Service: DaVinci;  Laterality: Left;   • KNEE ACL  RECONSTRUCTION Right    • MOLE REMOVAL  2020   • TOTAL HIP ARTHROPLASTY Left 2018   • VASECTOMY         SOCIAL HISTORY  Social History     Socioeconomic History   • Marital status:      Spouse name: Surya* ()   • Number of children: 2   • Years of education: Not on file   • Highest education level: Not on file   Occupational History   • Occupation: 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)   Tobacco Use   • Smoking status: Former Smoker     Years: 30.00     Types: Cigarettes     Quit date:      Years since quittin.9   • Smokeless tobacco: Never Used   • Tobacco comment: quit 20 years ago   Substance and Sexual Activity   • Alcohol use: Yes     Comment: 3-4 drinks/week   • Drug use: No   • Sexual activity: Not Currently   Social History Narrative    2019: Wife Surya*  from AML        FAMILY HISTORY  Family History   Problem Relation Age of Onset   • Lung cancer Mother         smoker;  age 64   • Anxiety disorder Mother    • Depression Mother    • Alcohol abuse Mother    • Heart attack Father 64   • Alcohol abuse Father    • Liver cancer Father    • Urolithiasis Other         all siblings   • Esophageal cancer Brother    • Depression Brother    • Hypertension Brother    • Hyperlipidemia Brother    • Diabetes Brother    • Hypertension Sister    • Hyperlipidemia Sister    • Colon cancer Neg Hx    • Prostate cancer Neg Hx    • Dementia Neg Hx    • Malig Hyperthermia Neg Hx          Examiner was wearing KN95 mask, face shield and exam gloves during the entire duration of the visit. Patient was masked the entire time.   Minimum social distance of 6 ft maintained entire visit except if physical contact was necessary as documented.     **Dragon Disclaimer:   Much of this encounter note is an electronic transcription/translation of spoken language to printed text. The electronic translation of spoken language may permit erroneous, or at times, nonsensical words or phrases to be inadvertently  transcribed. Although I have reviewed the note for such errors, some may still exist.     Template created by Panda Seaman MD

## 2020-12-03 NOTE — ASSESSMENT & PLAN NOTE
Lab Results   Component Value Date    PSA 5.5 (H) 11/25/2020    PSA 6.420 (H) 09/10/2020    PSA 2.330 06/08/2018      Still elevated. I recommended seeing a urologist but he would prefer to recheck in 4 months and decide then.     Starting doxazosin for blood pressure in September seems to help his urination symptoms.

## 2020-12-03 NOTE — ASSESSMENT & PLAN NOTE
Still high but is about to get on CPAP for SARAH.   Will increase doxazosin to 2 mg qHS (also helping with his urination).     BP Readings from Last 3 Encounters:   12/03/20 162/76   09/17/20 168/81   09/08/20 170/84

## 2021-01-11 DIAGNOSIS — I10 ESSENTIAL HYPERTENSION: ICD-10-CM

## 2021-01-11 RX ORDER — METOPROLOL SUCCINATE 50 MG/1
TABLET, EXTENDED RELEASE ORAL
Qty: 60 TABLET | Refills: 11 | Status: SHIPPED | OUTPATIENT
Start: 2021-01-11 | End: 2021-11-29 | Stop reason: SDUPTHER

## 2021-01-22 ENCOUNTER — APPOINTMENT (OUTPATIENT)
Dept: SLEEP MEDICINE | Facility: HOSPITAL | Age: 76
End: 2021-01-22

## 2021-02-16 DIAGNOSIS — F32.9 REACTIVE DEPRESSION: ICD-10-CM

## 2021-02-16 RX ORDER — CITALOPRAM 10 MG/1
TABLET ORAL
Qty: 30 TABLET | Refills: 5 | Status: SHIPPED | OUTPATIENT
Start: 2021-02-16 | End: 2021-03-15

## 2021-02-19 ENCOUNTER — APPOINTMENT (OUTPATIENT)
Dept: SLEEP MEDICINE | Facility: HOSPITAL | Age: 76
End: 2021-02-19

## 2021-03-02 DIAGNOSIS — Z23 IMMUNIZATION DUE: ICD-10-CM

## 2021-03-15 ENCOUNTER — OFFICE VISIT (OUTPATIENT)
Dept: SURGERY | Facility: CLINIC | Age: 76
End: 2021-03-15

## 2021-03-15 VITALS — BODY MASS INDEX: 37.11 KG/M2 | WEIGHT: 217.4 LBS | HEIGHT: 64 IN

## 2021-03-15 DIAGNOSIS — R10.32 LEFT GROIN PAIN: Primary | ICD-10-CM

## 2021-03-15 PROCEDURE — 99213 OFFICE O/P EST LOW 20 MIN: CPT | Performed by: SURGERY

## 2021-03-15 NOTE — PROGRESS NOTES
Chief complaint: Question inguinal hernia recurrence    History of presenting illness:  75-year-old gentleman who underwent da Kamlesh robot-assisted left inguinal hernia repair with mesh placement done June 2020 says that around a month or 2 months ago he noticed swelling in the left groin.  There is no significant pain associated with this.    Review of systems:  Constitutional: Negative for fever, chills, change in weight  Respiratory: Negative for cough or wheezing    Physical exam:  Body mass index 37.3  General: Awake and alert, no distress  Eyes: Extraocular movements are intact, no icterus  Respiratory: No use of accessory muscles, good bilateral chest expansion  Abdomen: Soft, benign, trocar sites are well-healed, no incisional hernia  Genitourinary: In the left groin there is a firm lesion, which feels more like seroma to me than anything else.  It is nontender.  There is nothing reducible here.  With cough and strain I do not notice any significant bulge.    Assessment and plan:  -75-year-old gentleman who is about 9 months out from laparoscopic left inguinal hernia repair with da Kamlesh robot  -I doubt this represents a recurrence, but I think a CT to evaluate this is appropriate.  Obviously if there is evidence of recurrence we would have to consider repeat repair, probably reasonable to look at open repair in that situation  -However, I suspect this is more likely a fluid collection and if that is the case, I will discussed with the patient what he would like to do with it, although it is not bothering him at this time.    Donavon Monae MD  General and Endoscopic Surgery  [facility]    [address], [address]  [place], [place], [address]  P: [phone]  F: [phone]

## 2021-03-16 ENCOUNTER — IMMUNIZATION (OUTPATIENT)
Dept: VACCINE CLINIC | Facility: HOSPITAL | Age: 76
End: 2021-03-16

## 2021-03-16 PROCEDURE — 0001A: CPT | Performed by: INTERNAL MEDICINE

## 2021-03-16 PROCEDURE — 91300 HC SARSCOV02 VAC 30MCG/0.3ML IM: CPT | Performed by: INTERNAL MEDICINE

## 2021-03-26 ENCOUNTER — HOSPITAL ENCOUNTER (OUTPATIENT)
Dept: CT IMAGING | Facility: HOSPITAL | Age: 76
Discharge: HOME | End: 2021-03-26
Admitting: SURGERY

## 2021-03-26 DIAGNOSIS — R10.32 LEFT GROIN PAIN: ICD-10-CM

## 2021-03-26 PROCEDURE — 72192 CT PELVIS W/O DYE: CPT

## 2021-04-01 ENCOUNTER — DOCUMENTATION (OUTPATIENT)
Dept: SURGERY | Facility: CLINIC | Age: 76
End: 2021-04-01

## 2021-04-01 NOTE — PROGRESS NOTES
Called and left voicemail explaining CT findings.  Small amount of fluid in the scrotum, I do not see any sign for hernia recurrence.  Would not recommend aspiration due to risk of infection.    Donavon Monae MD  General and Endoscopic Surgery  [facility]    [address], [address]  [place], [place], [address]  P: [phone]  F: [phone]

## 2021-04-02 ENCOUNTER — OFFICE VISIT (OUTPATIENT)
Dept: INTERNAL MEDICINE | Age: 76
End: 2021-04-02

## 2021-04-02 VITALS
HEART RATE: 65 BPM | SYSTOLIC BLOOD PRESSURE: 132 MMHG | HEIGHT: 64 IN | OXYGEN SATURATION: 98 % | WEIGHT: 208 LBS | DIASTOLIC BLOOD PRESSURE: 84 MMHG | BODY MASS INDEX: 35.51 KG/M2 | TEMPERATURE: 97.3 F

## 2021-04-02 DIAGNOSIS — G47.33 OSA (OBSTRUCTIVE SLEEP APNEA): Chronic | ICD-10-CM

## 2021-04-02 DIAGNOSIS — R97.20 INCREASED PROSTATE SPECIFIC ANTIGEN (PSA) VELOCITY: Chronic | ICD-10-CM

## 2021-04-02 DIAGNOSIS — I10 ESSENTIAL HYPERTENSION: Primary | Chronic | ICD-10-CM

## 2021-04-02 DIAGNOSIS — E78.2 MIXED HYPERLIPIDEMIA: Chronic | ICD-10-CM

## 2021-04-02 PROCEDURE — 99214 OFFICE O/P EST MOD 30 MIN: CPT | Performed by: INTERNAL MEDICINE

## 2021-04-02 NOTE — ASSESSMENT & PLAN NOTE
Check PSA (total/free). If PSA is higher I recommended seeing a urologist. Discussed PSA level interpretation, velocity change in relation to risk for prostate cancer. Urinating better with higher dose of doxazosin with improvement in blood pressure.     Lab Results   Component Value Date    PSA 5.5 (H) 11/25/2020    PSA 6.420 (H) 09/10/2020    PSA 2.330 06/08/2018

## 2021-04-02 NOTE — ASSESSMENT & PLAN NOTE
Improved with higher dose of doxazosin. Not able to tolerate CPAP.   Continue current blood pressure medications.

## 2021-04-02 NOTE — PROGRESS NOTES
I N T E R N A L  M E D I C I N E  J U N O H  K I M,  M D      ENCOUNTER DATE:  04/02/2021    Nicolás DONALDSON Wayne / 75 y.o. / male      CHIEF COMPLAINT / REASON FOR OFFICE VISIT     Hypertension and elevated PSA velocity      ASSESSMENT & PLAN     Problem List Items Addressed This Visit        High    Hypertension - Primary (Chronic)    Current Assessment & Plan     Improved with higher dose of doxazosin. Not able to tolerate CPAP.   Continue current blood pressure medications.          Relevant Medications    hydroCHLOROthiazide (HYDRODIURIL) 12.5 MG tablet    benazepril (LOTENSIN) 40 MG tablet    amLODIPine (NORVASC) 10 MG tablet    doxazosin (CARDURA) 2 MG tablet    metoprolol succinate XL (TOPROL-XL) 50 MG 24 hr tablet    Other Relevant Orders    Lipid Panel With / Chol / HDL Ratio    Comprehensive Metabolic Panel    Increased prostate specific antigen (PSA) velocity (Chronic)    Current Assessment & Plan     Check PSA (total/free). If PSA is higher I recommended seeing a urologist. Discussed PSA level interpretation, velocity change in relation to risk for prostate cancer. Urinating better with higher dose of doxazosin with improvement in blood pressure.     Lab Results   Component Value Date    PSA 5.5 (H) 11/25/2020    PSA 6.420 (H) 09/10/2020    PSA 2.330 06/08/2018             Relevant Orders    Urinalysis With Culture If Indicated -    PSA, Total & Free       Medium    Mixed hyperlipidemia (Chronic)    Overview     Continue rosuvastatin 10 mg qd.          Relevant Medications    rosuvastatin (CRESTOR) 10 MG tablet    Other Relevant Orders    Lipid Panel With / Chol / HDL Ratio    SARAH (obstructive sleep apnea) (Chronic)    Current Assessment & Plan     Was not able to tolerate CPAP. Advised to follow-up with sleep medicine.              Orders Placed This Encounter   Procedures   • Lipid Panel With / Chol / HDL Ratio   • Comprehensive Metabolic Panel   • Urinalysis With Culture If Indicated -   • PSA, Total  "& Free     No orders of the defined types were placed in this encounter.      SUMMARY/DISCUSSION  •       Next Appointment with me: Visit date not found    Return in about 6 months (around 10/2/2021) for Reassess chronic medical problems.      VITAL SIGNS     Visit Vitals  /84 (BP Location: Left arm)   Pulse 65   Temp 97.3 °F (36.3 °C)   Ht 162.6 cm (64\")   Wt 94.3 kg (208 lb)   SpO2 98%   BMI 35.70 kg/m²       BP Readings from Last 3 Encounters:   04/02/21 132/84   12/03/20 162/76   09/17/20 168/81     Wt Readings from Last 3 Encounters:   04/02/21 94.3 kg (208 lb)   03/15/21 98.6 kg (217 lb 6.4 oz)   12/03/20 92.5 kg (204 lb)     Body mass index is 35.7 kg/m².      MEDICATIONS AT THE TIME OF OFFICE VISIT     Current Outpatient Medications on File Prior to Visit   Medication Sig   • acetaminophen (TYLENOL) 650 MG 8 hr tablet Take 650 mg by mouth Every 8 (Eight) Hours As Needed.   • amLODIPine (NORVASC) 10 MG tablet TAKE ONE TABLET BY MOUTH DAILY   • benazepril (LOTENSIN) 40 MG tablet Take 1 tablet by mouth Daily.   • doxazosin (CARDURA) 2 MG tablet Take 1 tablet by mouth Every Night.   • hydroCHLOROthiazide (HYDRODIURIL) 12.5 MG tablet TAKE ONE TABLET BY MOUTH DAILY (Patient taking differently: Take 12.5 mg by mouth Daily.)   • metoprolol succinate XL (TOPROL-XL) 50 MG 24 hr tablet TAKE ONE TABLET BY MOUTH TWICE A DAY   • Multiple Vitamins-Minerals (MULTIVITAMIN ADULT PO) Take 1 tablet by mouth Daily.   • pantoprazole (PROTONIX) 40 MG EC tablet TAKE ONE TABLET BY MOUTH DAILY (Patient taking differently: Take 40 mg by mouth Daily.)   • rosuvastatin (CRESTOR) 10 MG tablet Take 1 tablet by mouth Every Night.   • Selenium 200 MCG capsule Take 200 mcg by mouth Daily.     No current facility-administered medications on file prior to visit.          HISTORY OF PRESENT ILLNESS     Urinating better and blood pressure is improved with higher dose of doxazosin.   Reports history of prostate biopsy > 15 yrs ago.   Denies " family history of prostate cancer.   Denies gross hematuria.         REVIEW OF SYSTEMS     Mild intended wt loss, no night sweat   No chest pain   SARAH not able to use CPAP   improved urination without gross hematuria, dysuria     PHYSICAL EXAMINATION     Physical Exam  Obese   Cardiovascular: Normal rate, regular rhythm.   Pulm/Chest: Effort normal, breath sounds normal.   Abdomen: protuberant abdomen  Normal thought and judgment       REVIEWED DATA     Labs:     Lab Results   Component Value Date    PSA 5.5 (H) 11/25/2020    PSA 6.420 (H) 09/10/2020    PSA 2.330 06/08/2018      Lab Results   Component Value Date     09/10/2020    K 4.0 09/10/2020    CALCIUM 9.9 09/10/2020    AST 38 09/10/2020    ALT 51 (H) 09/10/2020    BUN 20 09/10/2020    CREATININE 1.08 09/10/2020    CREATININE 1.34 (H) 06/09/2020    CREATININE 1.07 11/17/2019    EGFRIFNONA 67 09/10/2020    EGFRIFAFRI 81 09/10/2020       Lab Results   Component Value Date    HGBA1C 5.60 09/10/2020    HGBA1C 5.70 (H) 03/10/2020    HGBA1C 5.3 02/28/2018       Lab Results   Component Value Date     (H) 03/10/2020     (H) 01/29/2019    HDL 47 03/10/2020    TRIG 208 (H) 03/10/2020       Lab Results   Component Value Date    TSH 3.780 09/10/2020    FREET4 1.41 09/10/2020       Lab Results   Component Value Date    WBC 8.62 06/09/2020    HGB 15.9 06/09/2020     06/09/2020         Imaging:           Medical Tests:           Summary of old records / correspondence / consultant report:           Request outside records:             *Examiner was wearing KN95 mask, face shield and exam gloves during the entire duration of the visit. Patient was masked the entire time.   Minimum social distance of 6 ft maintained entire visit except if physical contact was necessary as documented.     **Dragon Disclaimer:   Much of this encounter note is an electronic transcription/translation of spoken language to printed text. The electronic translation of  spoken language may permit erroneous, or at times, nonsensical words or phrases to be inadvertently transcribed. Although I have reviewed the note for such errors, some may still exist.     Template created by Panda Seaman MD

## 2021-04-03 LAB
ALBUMIN SERPL-MCNC: 4.6 G/DL (ref 3.5–5.2)
ALBUMIN/GLOB SERPL: 2.1 G/DL
ALP SERPL-CCNC: 70 U/L (ref 39–117)
ALT SERPL-CCNC: 42 U/L (ref 1–41)
APPEARANCE UR: CLEAR
AST SERPL-CCNC: 26 U/L (ref 1–40)
BACTERIA #/AREA URNS HPF: ABNORMAL /HPF
BILIRUB SERPL-MCNC: 0.4 MG/DL (ref 0–1.2)
BILIRUB UR QL STRIP: NEGATIVE
BUN SERPL-MCNC: 19 MG/DL (ref 8–23)
BUN/CREAT SERPL: 15.6 (ref 7–25)
CALCIUM SERPL-MCNC: 10 MG/DL (ref 8.6–10.5)
CASTS URNS MICRO: ABNORMAL
CASTS URNS QL MICRO: PRESENT /LPF
CHLORIDE SERPL-SCNC: 103 MMOL/L (ref 98–107)
CHOLEST SERPL-MCNC: 166 MG/DL (ref 0–200)
CHOLEST/HDLC SERPL: 4.05 {RATIO}
CO2 SERPL-SCNC: 28.7 MMOL/L (ref 22–29)
COLOR UR: YELLOW
CREAT SERPL-MCNC: 1.22 MG/DL (ref 0.76–1.27)
EPI CELLS #/AREA URNS HPF: ABNORMAL /HPF (ref 0–10)
GLOBULIN SER CALC-MCNC: 2.2 GM/DL
GLUCOSE SERPL-MCNC: 134 MG/DL (ref 65–99)
GLUCOSE UR QL: NEGATIVE
HDLC SERPL-MCNC: 41 MG/DL (ref 40–60)
HGB UR QL STRIP: NEGATIVE
KETONES UR QL STRIP: NEGATIVE
LDLC SERPL CALC-MCNC: 64 MG/DL (ref 0–100)
LEUKOCYTE ESTERASE UR QL STRIP: NEGATIVE
MICRO URNS: ABNORMAL
MUCOUS THREADS URNS QL MICRO: PRESENT /HPF
NITRITE UR QL STRIP: NEGATIVE
PH UR STRIP: 5 [PH] (ref 5–7.5)
POTASSIUM SERPL-SCNC: 4.3 MMOL/L (ref 3.5–5.2)
PROT SERPL-MCNC: 6.8 G/DL (ref 6–8.5)
PROT UR QL STRIP: ABNORMAL
PSA FREE MFR SERPL: 31.7 %
PSA FREE SERPL-MCNC: 0.92 NG/ML
PSA SERPL-MCNC: 2.9 NG/ML (ref 0–4)
RBC #/AREA URNS HPF: ABNORMAL /HPF (ref 0–2)
SODIUM SERPL-SCNC: 143 MMOL/L (ref 136–145)
SP GR UR: 1.02 (ref 1–1.03)
TRIGL SERPL-MCNC: 394 MG/DL (ref 0–150)
URINALYSIS REFLEX: ABNORMAL
UROBILINOGEN UR STRIP-MCNC: 0.2 MG/DL (ref 0.2–1)
VLDLC SERPL CALC-MCNC: 61 MG/DL (ref 5–40)
WBC #/AREA URNS HPF: ABNORMAL /HPF (ref 0–5)

## 2021-04-05 ENCOUNTER — TELEPHONE (OUTPATIENT)
Dept: INTERNAL MEDICINE | Age: 76
End: 2021-04-05

## 2021-04-05 NOTE — PROGRESS NOTES
Call patient with results:    1. PSA is lower close to baseline. Recheck in 6 months.     2. Fasting glucose is again high. Verify whether he was fasting. Schedule him for 70 gram oral glucose tolerance test for diabetes testing within 2 weeks with lab. (Impaired fasting glucose)    3. LDL is lower but triglycerides are high. Maintain a low sugar/starch/carbohydydrate diet. Avoid any alcohol and concentrated sugars. Weight loss strongly advised. Come fasting on his next office visit with me.     See me in 3 months.

## 2021-04-05 NOTE — TELEPHONE ENCOUNTER
----- Message from Eusebio Seaman MD sent at 4/5/2021  6:44 AM EDT -----  Call patient with results:    1. PSA is lower close to baseline. Recheck in 6 months.     2. Fasting glucose is again high. Verify whether he was fasting. Schedule him for 70 gram oral glucose tolerance test for diabetes testing within 2 weeks with lab. (Impaired fasting glucose)    3. LDL is lower but triglycerides are high. Maintain a low sugar/starch/carbohydydrate diet. Avoid any alcohol and concentrated sugars. Weight loss strongly advised. Come fasting on his next office visit with me.     See me in 3 months.

## 2021-04-06 ENCOUNTER — IMMUNIZATION (OUTPATIENT)
Dept: VACCINE CLINIC | Facility: HOSPITAL | Age: 76
End: 2021-04-06

## 2021-04-06 PROCEDURE — 0002A: CPT | Performed by: INTERNAL MEDICINE

## 2021-04-06 PROCEDURE — 91300 HC SARSCOV02 VAC 30MCG/0.3ML IM: CPT | Performed by: INTERNAL MEDICINE

## 2021-05-27 DIAGNOSIS — I10 ESSENTIAL HYPERTENSION: Chronic | ICD-10-CM

## 2021-05-27 RX ORDER — PANTOPRAZOLE SODIUM 40 MG/1
40 TABLET, DELAYED RELEASE ORAL DAILY
Qty: 90 TABLET | Refills: 3 | Status: SHIPPED | OUTPATIENT
Start: 2021-05-27 | End: 2021-06-09

## 2021-05-27 RX ORDER — BENAZEPRIL HYDROCHLORIDE 40 MG/1
TABLET, FILM COATED ORAL
Qty: 90 TABLET | Refills: 3 | Status: SHIPPED | OUTPATIENT
Start: 2021-05-27 | End: 2022-07-11

## 2021-06-09 RX ORDER — PANTOPRAZOLE SODIUM 40 MG/1
TABLET, DELAYED RELEASE ORAL
Qty: 90 TABLET | Refills: 3 | Status: SHIPPED | OUTPATIENT
Start: 2021-06-09 | End: 2021-09-15 | Stop reason: SDUPTHER

## 2021-06-21 ENCOUNTER — OFFICE VISIT (OUTPATIENT)
Dept: SURGERY | Facility: CLINIC | Age: 76
End: 2021-06-21

## 2021-06-21 VITALS — BODY MASS INDEX: 34.76 KG/M2 | HEIGHT: 64 IN | WEIGHT: 203.6 LBS

## 2021-06-21 DIAGNOSIS — L98.9 SKIN LESION: Primary | ICD-10-CM

## 2021-06-21 PROCEDURE — 11104 PUNCH BX SKIN SINGLE LESION: CPT | Performed by: SURGERY

## 2021-06-21 PROCEDURE — 88305 TISSUE EXAM BY PATHOLOGIST: CPT | Performed by: SURGERY

## 2021-06-21 NOTE — PROGRESS NOTES
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  No pain and no drainage.    Past medical history: Obesity, hypertension, hyperlipidemia, left inguinal hernia, sleep apnea    Past surgical history: Da Kamlesh robot-assisted left inguinal hernia repair by me June 2020, left hip arthroplasty, right hip arthroplasty, colonoscopy 2017    Medications: Reviewed and reconciled    Allergies: Latex    Social history: Former smoker who quit greater than 20 years ago    Physical exam:  Body mass index 34.9  General: Awake and alert, no distress  Eyes: Extraocular wounds intact without icterus  Neck: Supple, trachea midline  Chest: Well-healed skin incision without sign of infection or lesion recurrence  Respiratory: No use of accessory muscles, good bilateral chest expansion  Genitourinary: No evidence of hernia recurrence on the left  Skin: On the medial aspect of the inferior portion of the right knee, there is a approximately 9 mm raised skin lesion suspicious for skin cancer.    Assessment and plan:  -Suspicious skin lesion right lower extremity  -Plan for punch biopsy today  -See procedure note below      Procedure note:    Preoperative diagnosis: Suspicious skin lesion right lower extremity    Postoperative diagnosis: Same    Procedure performed: Punch biopsy right lower extremity suspicious skin lesion    Surgeon: Donavon Monae MD    Anesthesia: Local    Specimens: Biopsy of suspicious skin lesion    Estimated blood loss: Less than 5 cc    Complications: None    Description of procedure:  After discussion with patient, he was brought to the procedure room and laid supine on the table.  His right lower extremity was sterilely prepped and draped.  The area in question was  infiltrated with a mixture of lidocaine and Marcaine.  The 4 mm punch biopsy device was used to take a representative sample along the inferomedial border of this skin lesion.  Direct pressure was applied for hemostasis.  A single 4-0 nylon suture was used to close the opening.  Sterile dressing was placed over this.  He tolerated this well.    Donavon Monae MD  General and Endoscopic Surgery  [facility]    [address], [address]  [place], [place], [address]  P: [phone]  F: [phone]

## 2021-06-22 DIAGNOSIS — I10 ESSENTIAL HYPERTENSION: Chronic | ICD-10-CM

## 2021-06-22 RX ORDER — DOXAZOSIN 2 MG/1
TABLET ORAL
Qty: 30 TABLET | Refills: 11 | Status: SHIPPED | OUTPATIENT
Start: 2021-06-22 | End: 2022-07-11

## 2021-06-25 LAB
LAB AP CASE REPORT: NORMAL
LAB AP CLINICAL INFORMATION: NORMAL
LAB AP INTRADEPARTMENTAL CONSULT: NORMAL
PATH REPORT.FINAL DX SPEC: NORMAL
PATH REPORT.GROSS SPEC: NORMAL

## 2021-07-07 ENCOUNTER — OFFICE VISIT (OUTPATIENT)
Dept: SURGERY | Facility: CLINIC | Age: 76
End: 2021-07-07

## 2021-07-07 DIAGNOSIS — C44.722 SQUAMOUS CELL CARCINOMA OF RIGHT LOWER LEG: Primary | ICD-10-CM

## 2021-07-07 PROCEDURE — 88305 TISSUE EXAM BY PATHOLOGIST: CPT | Performed by: SURGERY

## 2021-07-07 PROCEDURE — 12032 INTMD RPR S/A/T/EXT 2.6-7.5: CPT | Performed by: SURGERY

## 2021-07-07 PROCEDURE — 11603 EXC TR-EXT MAL+MARG 2.1-3 CM: CPT | Performed by: SURGERY

## 2021-07-07 NOTE — PROGRESS NOTES
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  This was evaluated with a punch biopsy and this returned evidence of squamous cell cancer.  He presents at this time for formal excision.    Description of procedure:  After discussion with patient, he was brought to the procedure room.  His right lower extremity was correctly identified and then sterilely prepped and draped.  His prior suture was removed.  The area in question was marked and I planned a transversely oriented elliptical incision with narrow margins.  This area was then infiltrated with a mixture of lidocaine and Marcaine.  An elliptical incision of approximately 3 x 2 cm was then made around the lesion and then carried through the skin into the subcutaneous tissue.  The lesion was removed in its entirety all the way into the deep subcutaneous tissue.  It was then marked with a short stitch superior and long stitch lateral.  The underlying subcutaneous tissues were then undermined for a couple of millimeters in each direction.  Hemostasis was gained with the Hyfrecator device.  The deep tissues were then closed with interrupted 3-0 Vicryl sutures and the skin was closed with a running 4-0 nylon.  Sterile dressings were applied.  He tolerated this well.    Donavon Monae MD  General and Endoscopic Surgery  [facility]    [address],  [address]  [place], [place], [address]  P: [phone]  F: [phone]

## 2021-07-21 ENCOUNTER — OFFICE VISIT (OUTPATIENT)
Dept: SURGERY | Facility: CLINIC | Age: 76
End: 2021-07-21

## 2021-07-21 VITALS — WEIGHT: 203.2 LBS | HEIGHT: 64 IN | BODY MASS INDEX: 34.69 KG/M2

## 2021-07-21 DIAGNOSIS — C44.722 SQUAMOUS CELL CARCINOMA OF RIGHT LOWER LEG: Primary | ICD-10-CM

## 2021-07-21 PROCEDURE — 99212 OFFICE O/P EST SF 10 MIN: CPT | Performed by: SURGERY

## 2021-07-21 NOTE — PROGRESS NOTES
Follow-up excision skin cancer from right lower extremity    Subjective:  No complaints.  No pain.    Objective:  General: Awake and alert, no distress  Extremities: Right lower extremity incision healing nicely, no sign of edema, infection.    Pathology reviewed with patient demonstrates squamous cell cancer, keratoacanthoma type, margins are clear    Assessment and plan:  -Status post excision of skin cancer from right lower extremity  -Sutures are removed today  -Steri-Strips placed, he can remove those 1 week from now  -Follow-up as needed    Donavon Monae MD  General and Endoscopic Surgery  [facility]    [address], [address]  [place], [place], [address]  P: [phone]  F: [phone]

## 2021-09-13 ENCOUNTER — TELEPHONE (OUTPATIENT)
Dept: INTERNAL MEDICINE | Age: 76
End: 2021-09-13

## 2021-09-13 NOTE — TELEPHONE ENCOUNTER
Caller: Nicolás Black    Relationship: Self    Best call back number: [phone] (H)    What medication are you requesting: SOMETHING PRESCRIPTION FOR POISON IVY    What are your current symptoms: POISON IVY FROM KNEES DOWN TO FEET, CHEST, ARMS, FOREHEAD, AND BACK, CALAMINE, CALADRYL, ANTI-ITCH SPRAY HAVE NOT WORKED ON IT    How long have you been experiencing symptoms: AT LEAST 2 WEEKS    Have you had these symptoms before:    [x] Yes  [] No    Have you been treated for these symptoms before:   [x] Yes  [] No    If a prescription is needed, what is your preferred pharmacy and phone number: [facility] [address] - [phone] [facility] [phone] FX     Additional notes: PATIENT IS REQUESTING SOMETHING BE CALLED IN FOR WHAT HE BELIEVES TO BE POISON IVY, PATIENT IS GOING TO SEND PICTURES OF RASH ON ARMS THROUGH [employer], PLEASE ADVISE PATIENT WHEN SENT TO PHARMACY [facility]

## 2021-09-15 ENCOUNTER — OFFICE VISIT (OUTPATIENT)
Dept: INTERNAL MEDICINE | Age: 76
End: 2021-09-15

## 2021-09-15 VITALS
HEIGHT: 64 IN | OXYGEN SATURATION: 98 % | WEIGHT: 188.4 LBS | DIASTOLIC BLOOD PRESSURE: 86 MMHG | TEMPERATURE: 98.8 F | SYSTOLIC BLOOD PRESSURE: 128 MMHG | BODY MASS INDEX: 32.17 KG/M2 | HEART RATE: 75 BPM

## 2021-09-15 DIAGNOSIS — L23.7 POISON IVY DERMATITIS: Primary | ICD-10-CM

## 2021-09-15 PROCEDURE — 99213 OFFICE O/P EST LOW 20 MIN: CPT | Performed by: NURSE PRACTITIONER

## 2021-09-15 RX ORDER — PANTOPRAZOLE SODIUM 40 MG/1
40 TABLET, DELAYED RELEASE ORAL DAILY
Qty: 90 TABLET | Refills: 3 | Status: SHIPPED | OUTPATIENT
Start: 2021-09-15 | End: 2022-09-14

## 2021-09-15 RX ORDER — PREDNISONE 20 MG/1
20 TABLET ORAL DAILY
Qty: 18 TABLET | Refills: 0 | Status: SHIPPED | OUTPATIENT
Start: 2021-09-15 | End: 2021-10-05

## 2021-09-15 NOTE — PROGRESS NOTES
"    I N T E R N A L  M E D I C I N E  RONNIE FALLON, APRN      ENCOUNTER DATE:  09/15/2021    Nicolás DONALDSON Wayne / 76 y.o. / male      CHIEF COMPLAINT / REASON FOR OFFICE VISIT     Rash (arms,chest, legs, back x3 wks)      ASSESSMENT & PLAN     1. Poison ivy dermatitis  -Initiate prednisone with titration slowly to discontinue  -Bactroban ointment on left foot as he is beginning to develop potential infection post poison ivy dermatitis    No orders of the defined types were placed in this encounter.    New Medications Ordered This Visit   Medications   • predniSONE (DELTASONE) 20 MG tablet     Sig: Take 1 tablet by mouth Daily. 40mg 5 days, 20mg 5 days, 10mg 5 days     Dispense:  18 tablet     Refill:  0   • mupirocin (Bactroban) 2 % ointment     Sig: Apply 1 application topically to the appropriate area as directed 3 (Three) Times a Day.     Dispense:  1 each     Refill:  0   • pantoprazole (PROTONIX) 40 MG EC tablet     Sig: Take 1 tablet by mouth Daily.     Dispense:  90 tablet     Refill:  3       SUMMARY/DISCUSSION  • Follow-up if symptoms do not improve or worsen, instructed to observe for any signs of developing skin infection    Next Appointment with me: Visit date not found    No follow-ups on file.      VITAL SIGNS     Visit Vitals  /86 (Cuff Size: Adult)   Pulse 75   Temp 98.8 °F (37.1 °C) (Temporal)   Ht 162.6 cm (64\")   Wt 85.5 kg (188 lb 6.4 oz)   SpO2 98%   BMI 32.34 kg/m²       Wt Readings from Last 3 Encounters:   09/15/21 85.5 kg (188 lb 6.4 oz)   07/21/21 92.2 kg (203 lb 3.2 oz)   06/21/21 92.4 kg (203 lb 9.6 oz)     Body mass index is 32.34 kg/m².      MEDICATIONS AT THE TIME OF OFFICE VISIT     Current Outpatient Medications on File Prior to Visit   Medication Sig   • acetaminophen (TYLENOL) 650 MG 8 hr tablet Take 650 mg by mouth Every 8 (Eight) Hours As Needed.   • amLODIPine (NORVASC) 10 MG tablet TAKE ONE TABLET BY MOUTH DAILY   • benazepril (LOTENSIN) 40 MG tablet TAKE ONE TABLET BY MOUTH " DAILY   • doxazosin (CARDURA) 2 MG tablet TAKE ONE TABLET BY MOUTH ONCE NIGHTLY   • hydroCHLOROthiazide (HYDRODIURIL) 12.5 MG tablet TAKE ONE TABLET BY MOUTH DAILY (Patient taking differently: Take 12.5 mg by mouth Daily.)   • metoprolol succinate XL (TOPROL-XL) 50 MG 24 hr tablet TAKE ONE TABLET BY MOUTH TWICE A DAY   • Multiple Vitamins-Minerals (MULTIVITAMIN ADULT PO) Take 1 tablet by mouth Daily.   • rosuvastatin (CRESTOR) 10 MG tablet Take 1 tablet by mouth Every Night.   • Selenium 200 MCG capsule Take 200 mcg by mouth Daily.   • [DISCONTINUED] pantoprazole (PROTONIX) 40 MG EC tablet TAKE ONE TABLET BY MOUTH DAILY **MUST CALL MD FOR APPOINTMENT     No current facility-administered medications on file prior to visit.         HISTORY OF PRESENT ILLNESS     Patient presents with 3 weeks of rash that she developed after a fishing trip with a friend in which he spent time in the woods.  After, he developed vesicular rash which started in his upper extremities and has since spread to his trunk and lower extremities.  No signs of systemic infection such as fever or chills.  He does appear to have some mild erythema in his left foot with fascicular drainage.  He is using chamomile lotion at this time to improve symptoms with minimal effect.    REVIEW OF SYSTEMS     Constitutional neg except per HPI   Skin pruritic rash to upper extremities, lower extremities and trunk    PHYSICAL EXAMINATION     Physical Exam  Constitutional  No distress  Skin vesicular rash to upper extremities, lower extremities, and trunk; mild erythema and scabbing to left foot    REVIEWED DATA     Labs:   Lab Results   Component Value Date    HGBA1C 5.60 09/10/2020         Imaging:           Medical Tests:             Summary of old records / correspondence / consultant report:           Request outside records:           *Examiner was wearing medical surgical mask, face shield and exam gloves during the entire duration of the visit. Patient was  masked the entire time.   Minimum social distance of 6 ft maintained entire visit except if physical contact was necessary as documented.     **Deborah Disclaimer:   Much of this encounter note is an electronic transcription/translation of spoken language to printed text. The electronic translation of spoken language may permit erroneous, or at times, nonsensical words or phrases to be inadvertently transcribed. Although I have reviewed the note for such errors, some may still exist.

## 2021-09-16 DIAGNOSIS — I10 ESSENTIAL HYPERTENSION: Chronic | ICD-10-CM

## 2021-09-17 RX ORDER — HYDROCHLOROTHIAZIDE 12.5 MG/1
12.5 TABLET ORAL DAILY
Qty: 90 TABLET | Refills: 3 | Status: SHIPPED | OUTPATIENT
Start: 2021-09-17 | End: 2022-03-09 | Stop reason: SINTOL

## 2021-10-05 ENCOUNTER — OFFICE VISIT (OUTPATIENT)
Dept: INTERNAL MEDICINE | Age: 76
End: 2021-10-05

## 2021-10-05 VITALS
TEMPERATURE: 98 F | BODY MASS INDEX: 33.12 KG/M2 | OXYGEN SATURATION: 96 % | DIASTOLIC BLOOD PRESSURE: 74 MMHG | WEIGHT: 194 LBS | HEIGHT: 64 IN | SYSTOLIC BLOOD PRESSURE: 130 MMHG | HEART RATE: 86 BPM

## 2021-10-05 DIAGNOSIS — F32.9 REACTIVE DEPRESSION: Chronic | ICD-10-CM

## 2021-10-05 DIAGNOSIS — L30.9 DERMATITIS: ICD-10-CM

## 2021-10-05 DIAGNOSIS — I10 PRIMARY HYPERTENSION: Chronic | ICD-10-CM

## 2021-10-05 DIAGNOSIS — E78.2 MIXED HYPERLIPIDEMIA: Chronic | ICD-10-CM

## 2021-10-05 DIAGNOSIS — R97.20 INCREASED PROSTATE SPECIFIC ANTIGEN (PSA) VELOCITY: Chronic | ICD-10-CM

## 2021-10-05 DIAGNOSIS — L30.9 SEVERE ECZEMA: Primary | ICD-10-CM

## 2021-10-05 DIAGNOSIS — G47.33 OSA (OBSTRUCTIVE SLEEP APNEA): Chronic | ICD-10-CM

## 2021-10-05 PROCEDURE — 99214 OFFICE O/P EST MOD 30 MIN: CPT | Performed by: INTERNAL MEDICINE

## 2021-10-05 RX ORDER — HYDROXYZINE HYDROCHLORIDE 25 MG/1
25 TABLET, FILM COATED ORAL EVERY 8 HOURS PRN
Qty: 45 TABLET | Refills: 0 | Status: SHIPPED | OUTPATIENT
Start: 2021-10-05 | End: 2022-02-08 | Stop reason: SDUPTHER

## 2021-10-05 NOTE — PROGRESS NOTES
I N T E R N A L  M E D I C I N E  J U N O H  K I M,  M D      ENCOUNTER DATE:  10/05/2021    Nicolás DONALDSON Wayne / 76 y.o. / male      CHIEF COMPLAINT / REASON FOR OFFICE VISIT     Hypertension, Hyperlipidemia, Hyperglycemia, and Reactive depression      ASSESSMENT & PLAN     Problem List Items Addressed This Visit        High    Hypertension (Chronic)    Overview     Continue amlodipine 10 mg qd and doxazosin 2 mg qHS.     BP Readings from Last 3 Encounters:   10/05/21 130/74   09/15/21 128/86   04/02/21 132/84             Relevant Medications    amLODIPine (NORVASC) 10 MG tablet    metoprolol succinate XL (TOPROL-XL) 50 MG 24 hr tablet    benazepril (LOTENSIN) 40 MG tablet    doxazosin (CARDURA) 2 MG tablet    hydroCHLOROthiazide (HYDRODIURIL) 12.5 MG tablet    Increased prostate specific antigen (PSA) velocity (Chronic)    Current Assessment & Plan     Lab Results   Component Value Date    PSA 2.9 04/02/2021    PSA 5.5 (H) 11/25/2020    PSA 6.420 (H) 09/10/2020        Recheck PSA on follow-up.             Medium    Mixed hyperlipidemia (Chronic)    Overview     Continue rosuvastatin 10 mg qd.          Relevant Medications    rosuvastatin (CRESTOR) 10 MG tablet    Reactive depression (Chronic)    Overview     Wife Surya passed away 2019    Continue citalopram 10 mg qd.          Relevant Medications    hydrOXYzine (ATARAX) 25 MG tablet    SARAH (obstructive sleep apnea) (Chronic)    Overview     Unable to tolerate CPAP          Eczema - Primary    Current Assessment & Plan     Extensive severe eczema/dermatitis. Short term response to prednisone with persistent problem. Will refer to dermatologist this week for evaluation. Would avoid more prednisone until evaluated by dermatologist. Recommended Gold Bond Eczema lotion and will prescribe hydroxyzine for PRN use. Avoid harsh soaps and wash with tepid water.          Relevant Medications    mupirocin (Bactroban) 2 % ointment    hydrOXYzine (ATARAX) 25 MG tablet    Other  "Relevant Orders    Ambulatory Referral to Dermatology (Completed)      Other Visit Diagnoses     Dermatitis        Relevant Medications    hydrOXYzine (ATARAX) 25 MG tablet    Other Relevant Orders    Ambulatory Referral to Dermatology (Completed)        Orders Placed This Encounter   Procedures   • Ambulatory Referral to Dermatology     New Medications Ordered This Visit   Medications   • hydrOXYzine (ATARAX) 25 MG tablet     Sig: Take 1 tablet by mouth Every 8 (Eight) Hours As Needed for Itching.     Dispense:  45 tablet     Refill:  0       SUMMARY/DISCUSSION  •       Next Appointment with me: Visit date not found    Return in about 4 months (around 2/5/2022) for Reassess chronic medical problems.      VITAL SIGNS     Visit Vitals  /74 (BP Location: Left arm)   Pulse 86   Temp 98 °F (36.7 °C)   Ht 162.6 cm (64\")   Wt 88 kg (194 lb)   SpO2 96%   BMI 33.30 kg/m²       BP Readings from Last 3 Encounters:   10/05/21 130/74   09/15/21 128/86   04/02/21 132/84     Wt Readings from Last 3 Encounters:   10/05/21 88 kg (194 lb)   09/15/21 85.5 kg (188 lb 6.4 oz)   07/21/21 92.2 kg (203 lb 3.2 oz)     Body mass index is 33.3 kg/m².      MEDICATIONS AT THE TIME OF OFFICE VISIT     Current Outpatient Medications on File Prior to Visit   Medication Sig   • acetaminophen (TYLENOL) 650 MG 8 hr tablet Take 650 mg by mouth Every 8 (Eight) Hours As Needed.   • amLODIPine (NORVASC) 10 MG tablet TAKE ONE TABLET BY MOUTH DAILY   • benazepril (LOTENSIN) 40 MG tablet TAKE ONE TABLET BY MOUTH DAILY   • doxazosin (CARDURA) 2 MG tablet TAKE ONE TABLET BY MOUTH ONCE NIGHTLY   • hydroCHLOROthiazide (HYDRODIURIL) 12.5 MG tablet Take 1 tablet by mouth Daily.   • metoprolol succinate XL (TOPROL-XL) 50 MG 24 hr tablet TAKE ONE TABLET BY MOUTH TWICE A DAY   • Multiple Vitamins-Minerals (MULTIVITAMIN ADULT PO) Take 1 tablet by mouth Daily.   • mupirocin (Bactroban) 2 % ointment Apply 1 application topically to the appropriate area as " directed 3 (Three) Times a Day.   • pantoprazole (PROTONIX) 40 MG EC tablet Take 1 tablet by mouth Daily.   • rosuvastatin (CRESTOR) 10 MG tablet Take 1 tablet by mouth Every Night.   • Selenium 200 MCG capsule Take 200 mcg by mouth Daily.   • [DISCONTINUED] predniSONE (DELTASONE) 20 MG tablet Take 1 tablet by mouth Daily. 40mg 5 days, 20mg 5 days, 10mg 5 days     No current facility-administered medications on file prior to visit.          HISTORY OF PRESENT ILLNESS     Saw APRN a month ago for extensive rash involving his entire body (save the face) and was thought to have poison ivy dermatitis and prescribed oral prednisone and he did have some short term improvement but worsened. Has intensely pruritic rash on his trunk, arms/legs. No fever, chills, pain or purulent drainage. This problem started about 3 weeks prior to her presentation a month ago. Denies any change in medications. No new vitamins or supplements. No change in soap, detergent. No suspicious contact with anything unusual.     Hypertension remains stable on multidrug regimen.     Patient Care Team:  Eusebio Seaman MD as PCP - General (Internal Medicine)  Teodoro Ling MD as Consulting Physician (Orthopedic Surgery)    REVIEW OF SYSTEMS     Review of Systems   No fever or chills  No chest pain or shortness of breath  No urticaria or angioedema   GI neg     PHYSICAL EXAMINATION     Physical Exam  No acute distress, obese  Cardiovascular: Normal rate, regular rhythm.  Pulm/Chest: Effort normal, breath sounds normal.   Extensive eczema/dermatitis on his trunk, arms/hands and legs   No evident infection or cellulitis       REVIEWED DATA     Labs:     Lab Results   Component Value Date     04/02/2021    K 4.3 04/02/2021    CALCIUM 10.0 04/02/2021    AST 26 04/02/2021    ALT 42 (H) 04/02/2021    BUN 19 04/02/2021    CREATININE 1.22 04/02/2021    CREATININE 1.08 09/10/2020    CREATININE 1.34 (H) 06/09/2020    EGFRIFNONA 58 (L) 04/02/2021     EGFRIFAFRI 71 04/02/2021       Lab Results   Component Value Date    HGBA1C 5.60 09/10/2020    HGBA1C 5.70 (H) 03/10/2020    HGBA1C 5.3 02/28/2018       Lab Results   Component Value Date    LDL 64 04/02/2021     (H) 03/10/2020    HDL 41 04/02/2021    TRIG 394 (H) 04/02/2021       Lab Results   Component Value Date    TSH 3.780 09/10/2020    TSH 3.230 04/17/2019    TSH 4.270 (H) 02/08/2018    FREET4 1.41 09/10/2020    FREET4 1.17 04/17/2019    FREET4 1.18 02/08/2018       Lab Results   Component Value Date    WBC 8.62 06/09/2020    HGB 15.9 06/09/2020     06/09/2020       No results found for: MICROALBUR        Imaging:           Medical Tests:           Summary of old records / correspondence / consultant report:           Request outside records:             *Examiner was wearing KN95 mask and eye protection during the entire duration of the visit. Patient was masked the entire time. Minimum social distance of 6 ft maintained entire visit except if physical contact was necessary as documented.     **Dragon Disclaimer: Much of this encounter note is an electronic transcription/translation of spoken language to printed text. The electronic translation of spoken language may permit erroneous, or at times, nonsensical words or phrases to be inadvertently transcribed. Although I have reviewed the note for such errors, some may still exist.       Template created by Panda Seaman MD

## 2021-10-05 NOTE — PATIENT INSTRUCTIONS
** IMPORTANT MESSAGE FROM DR. CONKLIN **    In our office, your satisfaction is VERY important to us.     You may receive a survey from [facility] by mail or E-mail for you to provide feedback about your visit. This information is invaluable for me to know what we can do to improve our services.     I ask that you please take a few minutes to complete the survey and let us know how we are doing in serving your needs. (You may receive the survey more than once for multiple visits)    Thank You !    Dr. Conklin & Staff    _________________________________________________________________________________________________________________________      ** ADDITIONAL INSTRUCTION / REMINDERS FROM DR. CONKLIN **

## 2021-10-05 NOTE — ASSESSMENT & PLAN NOTE
Lab Results   Component Value Date    PSA 2.9 04/02/2021    PSA 5.5 (H) 11/25/2020    PSA 6.420 (H) 09/10/2020        Recheck PSA on follow-up.

## 2021-10-05 NOTE — ASSESSMENT & PLAN NOTE
Extensive severe eczema/dermatitis. Short term response to prednisone with persistent problem. Will refer to dermatologist this week for evaluation. Would avoid more prednisone until evaluated by dermatologist. Recommended Gold Bond Eczema lotion and will prescribe hydroxyzine for PRN use. Avoid harsh soaps and wash with tepid water.

## 2021-11-29 DIAGNOSIS — I10 ESSENTIAL HYPERTENSION: ICD-10-CM

## 2021-11-29 RX ORDER — METOPROLOL SUCCINATE 50 MG/1
50 TABLET, EXTENDED RELEASE ORAL 2 TIMES DAILY
Qty: 90 TABLET | Refills: 3 | Status: SHIPPED | OUTPATIENT
Start: 2021-11-29 | End: 2022-06-16

## 2022-02-08 ENCOUNTER — OFFICE VISIT (OUTPATIENT)
Dept: INTERNAL MEDICINE | Age: 77
End: 2022-02-08

## 2022-02-08 VITALS
WEIGHT: 196 LBS | OXYGEN SATURATION: 98 % | HEART RATE: 74 BPM | BODY MASS INDEX: 33.46 KG/M2 | HEIGHT: 64 IN | SYSTOLIC BLOOD PRESSURE: 120 MMHG | DIASTOLIC BLOOD PRESSURE: 76 MMHG | TEMPERATURE: 97.5 F

## 2022-02-08 DIAGNOSIS — L30.9 SEVERE ECZEMA: ICD-10-CM

## 2022-02-08 DIAGNOSIS — F32.9 REACTIVE DEPRESSION: Chronic | ICD-10-CM

## 2022-02-08 DIAGNOSIS — E78.2 MIXED HYPERLIPIDEMIA: ICD-10-CM

## 2022-02-08 DIAGNOSIS — I10 PRIMARY HYPERTENSION: Primary | Chronic | ICD-10-CM

## 2022-02-08 PROCEDURE — G0008 ADMIN INFLUENZA VIRUS VAC: HCPCS | Performed by: INTERNAL MEDICINE

## 2022-02-08 PROCEDURE — 90662 IIV NO PRSV INCREASED AG IM: CPT | Performed by: INTERNAL MEDICINE

## 2022-02-08 PROCEDURE — 99214 OFFICE O/P EST MOD 30 MIN: CPT | Performed by: INTERNAL MEDICINE

## 2022-02-08 RX ORDER — HYDROXYZINE HYDROCHLORIDE 25 MG/1
25 TABLET, FILM COATED ORAL EVERY 8 HOURS PRN
Qty: 45 TABLET | Refills: 0 | Status: SHIPPED | OUTPATIENT
Start: 2022-02-08 | End: 2022-07-26

## 2022-02-08 NOTE — PATIENT INSTRUCTIONS
** IMPORTANT MESSAGE FROM DR. CONKLIN **    In our office, your satisfaction is VERY important to us.     You may receive a survey from [employer] by mail or E-mail for you to provide feedback about your visit. This information is invaluable for me to know what we can do to improve our services.     I ask that you please take a few minutes to complete the survey and let us know how we are doing in serving your needs. (You may receive the survey more than once for multiple visits)    Thank You !    Dr. Conklin    _________________________________________________________________________________________________________________________      ** ADDITIONAL INSTRUCTION / REMINDERS FROM DR. CONKLIN **    Discuss with your dermatologist re: current medications and eczema.

## 2022-02-08 NOTE — PROGRESS NOTES
I N T E R N A L  M E D I C I N E  J U N O H  K I M,  M D      ENCOUNTER DATE:  02/08/2022    Nicolás DONALDSON Wayne / 76 y.o. / male      CHIEF COMPLAINT / REASON FOR OFFICE VISIT     Hypertension, Hyperlipidemia, Obesity, Increased prostate specific antigen (PSA) velocity, and Severe eczema      ASSESSMENT & PLAN     Problem List Items Addressed This Visit        High    Hypertension - Primary (Chronic)    Overview     Continue amlodipine 10 mg , benazepril 40 mg, HCTZ 12.5 mg and doxazosin 2 mg qHS.          Relevant Medications    amLODIPine (NORVASC) 10 MG tablet    benazepril (LOTENSIN) 40 MG tablet    doxazosin (CARDURA) 2 MG tablet    hydroCHLOROthiazide (HYDRODIURIL) 12.5 MG tablet    metoprolol succinate XL (TOPROL-XL) 50 MG 24 hr tablet    Other Relevant Orders    Comprehensive Metabolic Panel    Severe eczema    Current Assessment & Plan     Refill hydroxyzine. Follow-up with dermatologist. Review medications with dermatologist.         Relevant Medications    mupirocin (Bactroban) 2 % ointment    hydrOXYzine (ATARAX) 25 MG tablet       Medium    Mixed hyperlipidemia (Chronic)    Overview     Continue rosuvastatin 10 mg qd.          Relevant Medications    rosuvastatin (CRESTOR) 10 MG tablet    Other Relevant Orders    Comprehensive Metabolic Panel    Lipid Panel With / Chol / HDL Ratio    Reactive depression (Chronic)    Overview     Wife Surya passed away 2019         Current Assessment & Plan     Has stopped citalopram on his own. Doing okay.          Relevant Medications    hydrOXYzine (ATARAX) 25 MG tablet        Orders Placed This Encounter   Procedures   • Fluzone High-Dose 65+yrs   • Comprehensive Metabolic Panel   • Lipid Panel With / Chol / HDL Ratio     New Medications Ordered This Visit   Medications   • hydrOXYzine (ATARAX) 25 MG tablet     Sig: Take 1 tablet by mouth Every 8 (Eight) Hours As Needed for Itching.     Dispense:  45 tablet     Refill:  0       SUMMARY/DISCUSSION  •       Next  "Appointment with me: Visit date not found    Return in about 5 months (around 7/8/2022) for COMBINED AWV AND MEDICAL F/U (15 MIN).      VITAL SIGNS     Visit Vitals  /76 (BP Location: Left arm)   Pulse 74   Temp 97.5 °F (36.4 °C)   Ht 162.6 cm (64\")   Wt 88.9 kg (196 lb)   SpO2 98%   BMI 33.64 kg/m²       BP Readings from Last 3 Encounters:   02/08/22 120/76   10/05/21 130/74   09/15/21 128/86     Wt Readings from Last 3 Encounters:   02/08/22 88.9 kg (196 lb)   10/05/21 88 kg (194 lb)   09/15/21 85.5 kg (188 lb 6.4 oz)     Body mass index is 33.64 kg/m².      MEDICATIONS AT THE TIME OF OFFICE VISIT     Current Outpatient Medications on File Prior to Visit   Medication Sig   • acetaminophen (TYLENOL) 650 MG 8 hr tablet Take 650 mg by mouth Every 8 (Eight) Hours As Needed.   • amLODIPine (NORVASC) 10 MG tablet TAKE ONE TABLET BY MOUTH DAILY   • benazepril (LOTENSIN) 40 MG tablet TAKE ONE TABLET BY MOUTH DAILY   • doxazosin (CARDURA) 2 MG tablet TAKE ONE TABLET BY MOUTH ONCE NIGHTLY   • hydroCHLOROthiazide (HYDRODIURIL) 12.5 MG tablet Take 1 tablet by mouth Daily.   • metoprolol succinate XL (TOPROL-XL) 50 MG 24 hr tablet Take 1 tablet by mouth 2 (Two) Times a Day.   • Multiple Vitamins-Minerals (MULTIVITAMIN ADULT PO) Take 1 tablet by mouth Daily.   • mupirocin (Bactroban) 2 % ointment Apply 1 application topically to the appropriate area as directed 3 (Three) Times a Day.   • pantoprazole (PROTONIX) 40 MG EC tablet Take 1 tablet by mouth Daily.   • rosuvastatin (CRESTOR) 10 MG tablet Take 1 tablet by mouth Every Night.   • Selenium 200 MCG capsule Take 200 mcg by mouth Daily.   • hydrOXYzine (ATARAX) 25 MG tablet Take 1 tablet by mouth Every 8 (Eight) Hours As Needed for Itching.     No current facility-administered medications on file prior to visit.          HISTORY OF PRESENT ILLNESS     Hypertension is well controlled on multidrug regimen without significant side effects.  On rosuvastatin 10 mg without " problems for hyperlipidemia.  Patient stopped taking citalopram on his own for reactive depression and seems to be doing okay.  He is seen his dermatologist for severe eczema.  Needs a refill on hydroxyzine.      Patient Care Team:  Eusebio Seaman MD as PCP - General (Internal Medicine)  Teodoro Ling MD as Consulting Physician (Orthopedic Surgery)    REVIEW OF SYSTEMS     Review of Systems   Wt gain  No chest pain or shortness of breath  Severe eczem and pruritus   Mood stable     PHYSICAL EXAMINATION     Physical Exam  General: No acute distress; obese  Psych: Normal thought and judgment   Cardiovascular Rate: normal. Rhythm: regular. Heart sounds: normal.   Pulm/Chest: Effort normal, breath sounds normal.   Skin: eczema on his arms       REVIEWED DATA     Labs:     Lab Results   Component Value Date     04/02/2021    K 4.3 04/02/2021    CALCIUM 10.0 04/02/2021    AST 26 04/02/2021    ALT 42 (H) 04/02/2021    BUN 19 04/02/2021    CREATININE 1.22 04/02/2021    CREATININE 1.08 09/10/2020    CREATININE 1.34 (H) 06/09/2020    EGFRIFNONA 58 (L) 04/02/2021    EGFRIFAFRI 71 04/02/2021       Lab Results   Component Value Date    HGBA1C 5.60 09/10/2020    HGBA1C 5.70 (H) 03/10/2020    HGBA1C 5.3 02/28/2018       Lab Results   Component Value Date    LDL 64 04/02/2021     (H) 03/10/2020    HDL 41 04/02/2021    TRIG 394 (H) 04/02/2021       Lab Results   Component Value Date    TSH 3.780 09/10/2020    TSH 3.230 04/17/2019    TSH 4.270 (H) 02/08/2018    FREET4 1.41 09/10/2020    FREET4 1.17 04/17/2019    FREET4 1.18 02/08/2018       Lab Results   Component Value Date    WBC 8.62 06/09/2020    HGB 15.9 06/09/2020     06/09/2020     Lab Results   Component Value Date    PSA 2.9 04/02/2021    PSA 5.5 (H) 11/25/2020    PSA 6.420 (H) 09/10/2020      No results found for: MICROALBUR        Imaging:           Medical Tests:           Summary of old records / correspondence / consultant report:            Request outside records:             *Examiner was wearing KN95 mask and eye protection during the entire duration of the visit. Patient was masked the entire time. Minimum social distance of 6 ft maintained entire visit except if physical contact was necessary as documented.       Template created by Panda Seaman MD

## 2022-02-09 LAB
ALBUMIN SERPL-MCNC: 5 G/DL (ref 3.7–4.7)
ALBUMIN/GLOB SERPL: 1.9 {RATIO} (ref 1.2–2.2)
ALP SERPL-CCNC: 77 IU/L (ref 44–121)
ALT SERPL-CCNC: 62 IU/L (ref 0–44)
AST SERPL-CCNC: 46 IU/L (ref 0–40)
BILIRUB SERPL-MCNC: 0.5 MG/DL (ref 0–1.2)
BUN SERPL-MCNC: 30 MG/DL (ref 8–27)
BUN/CREAT SERPL: 17 (ref 10–24)
CALCIUM SERPL-MCNC: 10.8 MG/DL (ref 8.6–10.2)
CHLORIDE SERPL-SCNC: 101 MMOL/L (ref 96–106)
CHOLEST SERPL-MCNC: 301 MG/DL (ref 100–199)
CHOLEST/HDLC SERPL: 7.7 RATIO (ref 0–5)
CO2 SERPL-SCNC: 24 MMOL/L (ref 20–29)
CREAT SERPL-MCNC: 1.72 MG/DL (ref 0.76–1.27)
GLOBULIN SER CALC-MCNC: 2.7 G/DL (ref 1.5–4.5)
GLUCOSE SERPL-MCNC: 137 MG/DL (ref 65–99)
HDLC SERPL-MCNC: 39 MG/DL
LDLC SERPL CALC-MCNC: 186 MG/DL (ref 0–99)
POTASSIUM SERPL-SCNC: 4.5 MMOL/L (ref 3.5–5.2)
PROT SERPL-MCNC: 7.7 G/DL (ref 6–8.5)
SODIUM SERPL-SCNC: 145 MMOL/L (ref 134–144)
TRIGL SERPL-MCNC: 379 MG/DL (ref 0–149)
VLDLC SERPL CALC-MCNC: 76 MG/DL (ref 5–40)

## 2022-02-09 NOTE — PROGRESS NOTES
CALL PATIENT with results:    His kidney function is abnormal. Cholesterol is significantly worse. Liver labs are abnormal.   - verify whether he is compliant with all his meds   - verify if he has been using any over-the-counter NSAIDs   - verify whether he’s drinking adequate amount of water   - hold hydrochlorothiazide for now until further notice   - recheck CMP in 2 weeks along with UA with micro (Dx acute renal insufficiency)  - see me in 3 weeks to discuss above labs.

## 2022-02-16 DIAGNOSIS — N28.9 ACUTE RENAL INSUFFICIENCY: Primary | ICD-10-CM

## 2022-03-09 ENCOUNTER — OFFICE VISIT (OUTPATIENT)
Dept: INTERNAL MEDICINE | Age: 77
End: 2022-03-09

## 2022-03-09 VITALS
OXYGEN SATURATION: 99 % | SYSTOLIC BLOOD PRESSURE: 126 MMHG | HEART RATE: 70 BPM | DIASTOLIC BLOOD PRESSURE: 74 MMHG | BODY MASS INDEX: 34.49 KG/M2 | HEIGHT: 64 IN | WEIGHT: 202 LBS | TEMPERATURE: 97.5 F

## 2022-03-09 DIAGNOSIS — I10 PRIMARY HYPERTENSION: Primary | Chronic | ICD-10-CM

## 2022-03-09 DIAGNOSIS — E66.9 OBESITY (BMI 30-39.9): Chronic | ICD-10-CM

## 2022-03-09 DIAGNOSIS — E78.2 MIXED HYPERLIPIDEMIA: Chronic | ICD-10-CM

## 2022-03-09 PROCEDURE — 99214 OFFICE O/P EST MOD 30 MIN: CPT | Performed by: INTERNAL MEDICINE

## 2022-03-09 NOTE — ASSESSMENT & PLAN NOTE
Previously discontinued HCTZ 12.5 mg due to prerenal azotemia. Corrected after discontinuation. Blood pressure remains stable.     Continue metoprolol succinate 50 BID, benazepril 40 mg, and amlodipine 10 mg qd, and doxazosin 2 mg qHS.     BP Readings from Last 3 Encounters:   03/09/22 126/74   02/08/22 120/76   10/05/21 130/74       Lab Results   Component Value Date    CREATININE 1.25 03/02/2022    CREATININE 1.72 (H) 02/08/2022    CREATININE 1.22 04/02/2021    BUN 17 03/02/2022    EGFRIFNONA 38 (L) 02/08/2022    EGFRIFAFRI 44 (L) 02/08/2022

## 2022-03-09 NOTE — ASSESSMENT & PLAN NOTE
Advised him on weight loss. Avoid sugars/juice and decrease carbohydrates.     Wt Readings from Last 3 Encounters:   03/09/22 91.6 kg (202 lb)   02/08/22 88.9 kg (196 lb)   10/05/21 88 kg (194 lb)      Body mass index is 34.67 kg/m².

## 2022-03-09 NOTE — ASSESSMENT & PLAN NOTE
Lab Results   Component Value Date     (H) 02/08/2022    LDL 64 04/02/2021     (H) 03/10/2020    HDL 39 (L) 02/08/2022        Probably was not taking rosuvastatin on last lab. Resume rosuvastatin 10 mg daily.

## 2022-03-09 NOTE — PROGRESS NOTES
I N T E R N A L  M E D I C I N E  J U N O H  K I M,  M D      ENCOUNTER DATE:  03/09/2022    Nicolás DONALDSON Wayne / 76 y.o. / male      CHIEF COMPLAINT / REASON FOR OFFICE VISIT     abnormal lab result  (kidney function is abnormal. Cholesterol is significantly worse. Liver labs are abnormal)      ASSESSMENT & PLAN     Problem List Items Addressed This Visit        High    Hypertension - Primary (Chronic)    Current Assessment & Plan     Previously discontinued HCTZ 12.5 mg due to prerenal azotemia. Corrected after discontinuation. Blood pressure remains stable.     Continue metoprolol succinate 50 BID, benazepril 40 mg, and amlodipine 10 mg qd, and doxazosin 2 mg qHS.     BP Readings from Last 3 Encounters:   03/09/22 126/74   02/08/22 120/76   10/05/21 130/74       Lab Results   Component Value Date    CREATININE 1.25 03/02/2022    CREATININE 1.72 (H) 02/08/2022    CREATININE 1.22 04/02/2021    BUN 17 03/02/2022    EGFRIFNONA 38 (L) 02/08/2022    EGFRIFAFRI 44 (L) 02/08/2022               Relevant Medications    amLODIPine (NORVASC) 10 MG tablet    benazepril (LOTENSIN) 40 MG tablet    doxazosin (CARDURA) 2 MG tablet    metoprolol succinate XL (TOPROL-XL) 50 MG 24 hr tablet       Medium    Mixed hyperlipidemia (Chronic)    Overview     Continue rosuvastatin 10 mg qd.            Current Assessment & Plan     Lab Results   Component Value Date     (H) 02/08/2022    LDL 64 04/02/2021     (H) 03/10/2020    HDL 39 (L) 02/08/2022        Probably was not taking rosuvastatin on last lab. Resume rosuvastatin 10 mg daily.            Relevant Medications    rosuvastatin (CRESTOR) 10 MG tablet       Low    Obesity (BMI 30-39.9) (Chronic)    Current Assessment & Plan     Advised him on weight loss. Avoid sugars/juice and decrease carbohydrates.     Wt Readings from Last 3 Encounters:   03/09/22 91.6 kg (202 lb)   02/08/22 88.9 kg (196 lb)   10/05/21 88 kg (194 lb)      Body mass index is 34.67 kg/m².             "    No orders of the defined types were placed in this encounter.    No orders of the defined types were placed in this encounter.      SUMMARY/DISCUSSION  •       Next Appointment with me: 7/26/2022    Return for Next scheduled follow up.      VITAL SIGNS     Visit Vitals  /74 (BP Location: Left arm)   Pulse 70   Temp 97.5 °F (36.4 °C)   Ht 162.6 cm (64\")   Wt 91.6 kg (202 lb)   SpO2 99%   BMI 34.67 kg/m²       BP Readings from Last 3 Encounters:   03/09/22 126/74   02/08/22 120/76   10/05/21 130/74     Wt Readings from Last 3 Encounters:   03/09/22 91.6 kg (202 lb)   02/08/22 88.9 kg (196 lb)   10/05/21 88 kg (194 lb)     Body mass index is 34.67 kg/m².    Blood pressure readings recorded on patient flowsheet:  No flowsheet data found.       MEDICATIONS AT THE TIME OF OFFICE VISIT     Current Outpatient Medications on File Prior to Visit   Medication Sig   • acetaminophen (TYLENOL) 650 MG 8 hr tablet Take 650 mg by mouth Every 8 (Eight) Hours As Needed.   • amLODIPine (NORVASC) 10 MG tablet TAKE ONE TABLET BY MOUTH DAILY   • benazepril (LOTENSIN) 40 MG tablet TAKE ONE TABLET BY MOUTH DAILY   • doxazosin (CARDURA) 2 MG tablet TAKE ONE TABLET BY MOUTH ONCE NIGHTLY   • hydrOXYzine (ATARAX) 25 MG tablet Take 1 tablet by mouth Every 8 (Eight) Hours As Needed for Itching.   • metoprolol succinate XL (TOPROL-XL) 50 MG 24 hr tablet Take 1 tablet by mouth 2 (Two) Times a Day.   • Multiple Vitamins-Minerals (MULTIVITAMIN ADULT PO) Take 1 tablet by mouth Daily.   • pantoprazole (PROTONIX) 40 MG EC tablet Take 1 tablet by mouth Daily.   • rosuvastatin (CRESTOR) 10 MG tablet Take 1 tablet by mouth Every Night.   • Selenium 200 MCG capsule Take 200 mcg by mouth Daily.   • [DISCONTINUED] mupirocin (Bactroban) 2 % ointment Apply 1 application topically to the appropriate area as directed 3 (Three) Times a Day.   • hydroCHLOROthiazide (HYDRODIURIL) 12.5 MG tablet Take 1 tablet by mouth Daily.     No current " facility-administered medications on file prior to visit.          HISTORY OF PRESENT ILLNESS     Previously we discontinued hydrochlorothiazide due to prerenal azotemia and hypercalcemia.  Repeat labs show improved.  In creatinine, BUN/creatinine ratio and normalization hypercalcemia.  He was also noted to have higher than baseline AST/ALT which has also normalized.  His LDL was noted to be significantly higher than before and this is probably due to poor compliance. His weight continues to increase and he reports to drinking a lot of juice.      Patient Care Team:  Eusebio Seaman MD as PCP - General (Internal Medicine)  Teodoro Ling MD as Consulting Physician (Orthopedic Surgery)    REVIEW OF SYSTEMS     Review of Systems   Weight gain   No headaches or chest pain   No shortness of breath    negative     PHYSICAL EXAMINATION     Physical Exam  Obese   Alert with normal thought and judgment.       REVIEWED DATA     Labs:     Lab Results   Component Value Date     (H) 03/02/2022    K 4.3 03/02/2022    CALCIUM 9.5 03/02/2022    AST 29 03/02/2022    ALT 38 03/02/2022    BUN 17 03/02/2022    CREATININE 1.25 03/02/2022    CREATININE 1.72 (H) 02/08/2022    CREATININE 1.22 04/02/2021    EGFRIFNONA 38 (L) 02/08/2022    EGFRIFAFRI 44 (L) 02/08/2022     Lab Results   Component Value Date    AST 29 03/02/2022    AST 46 (H) 02/08/2022    AST 26 04/02/2021      Lab Results   Component Value Date    ALT 38 03/02/2022    ALT 62 (H) 02/08/2022    ALT 42 (H) 04/02/2021      Lab Results   Component Value Date    BILITOT 0.3 03/02/2022    ALBUMIN 4.5 03/02/2022    ALKPHOS 68 03/02/2022       Lab Results   Component Value Date    HGBA1C 5.60 09/10/2020    HGBA1C 5.70 (H) 03/10/2020    HGBA1C 5.3 02/28/2018       Lab Results   Component Value Date     (H) 02/08/2022    LDL 64 04/02/2021    HDL 39 (L) 02/08/2022    TRIG 379 (H) 02/08/2022       Lab Results   Component Value Date    TSH 3.780 09/10/2020    TSH 3.230  04/17/2019    TSH 4.270 (H) 02/08/2018    FREET4 1.41 09/10/2020    FREET4 1.17 04/17/2019    FREET4 1.18 02/08/2018       Lab Results   Component Value Date    WBC 8.62 06/09/2020    HGB 15.9 06/09/2020     06/09/2020     Lab Results   Component Value Date    PSA 2.9 04/02/2021    PSA 5.5 (H) 11/25/2020    PSA 6.420 (H) 09/10/2020      No results found for: MICROALBUR        Imaging:           Medical Tests:           Summary of old records / correspondence / consultant report:           Request outside records:             *Examiner was wearing KN95 mask and eye protection during the entire duration of the visit. Patient was masked the entire time. Minimum social distance of 6 ft maintained entire visit except if physical contact was necessary as documented.       Template created by Panda Seaman MD

## 2022-03-11 DIAGNOSIS — E78.2 MIXED HYPERLIPIDEMIA: Chronic | ICD-10-CM

## 2022-03-11 RX ORDER — ROSUVASTATIN CALCIUM 10 MG/1
TABLET, COATED ORAL
Qty: 90 TABLET | Refills: 3 | Status: SHIPPED | OUTPATIENT
Start: 2022-03-11 | End: 2023-03-16

## 2022-03-20 DIAGNOSIS — I10 ESSENTIAL HYPERTENSION: Chronic | ICD-10-CM

## 2022-03-21 RX ORDER — AMLODIPINE BESYLATE 10 MG/1
TABLET ORAL
Qty: 90 TABLET | Refills: 3 | Status: SHIPPED | OUTPATIENT
Start: 2022-03-21 | End: 2023-03-16

## 2022-06-16 DIAGNOSIS — I10 ESSENTIAL HYPERTENSION: ICD-10-CM

## 2022-06-16 RX ORDER — METOPROLOL SUCCINATE 50 MG/1
TABLET, EXTENDED RELEASE ORAL
Qty: 180 TABLET | Refills: 3 | Status: SHIPPED | OUTPATIENT
Start: 2022-06-16

## 2022-07-09 DIAGNOSIS — I10 ESSENTIAL HYPERTENSION: Chronic | ICD-10-CM

## 2022-07-11 RX ORDER — BENAZEPRIL HYDROCHLORIDE 40 MG/1
TABLET, FILM COATED ORAL
Qty: 90 TABLET | Refills: 0 | Status: SHIPPED | OUTPATIENT
Start: 2022-07-11 | End: 2022-08-19 | Stop reason: SDUPTHER

## 2022-07-11 RX ORDER — DOXAZOSIN 2 MG/1
TABLET ORAL
Qty: 90 TABLET | Refills: 0 | Status: SHIPPED | OUTPATIENT
Start: 2022-07-11 | End: 2022-10-13 | Stop reason: SDUPTHER

## 2022-07-26 ENCOUNTER — OFFICE VISIT (OUTPATIENT)
Dept: INTERNAL MEDICINE | Age: 77
End: 2022-07-26

## 2022-07-26 VITALS
OXYGEN SATURATION: 98 % | WEIGHT: 206 LBS | HEIGHT: 64 IN | SYSTOLIC BLOOD PRESSURE: 110 MMHG | BODY MASS INDEX: 35.17 KG/M2 | HEART RATE: 68 BPM | TEMPERATURE: 97.3 F | DIASTOLIC BLOOD PRESSURE: 70 MMHG

## 2022-07-26 DIAGNOSIS — Z00.00 MEDICARE ANNUAL WELLNESS VISIT, SUBSEQUENT: Primary | ICD-10-CM

## 2022-07-26 DIAGNOSIS — I10 PRIMARY HYPERTENSION: Chronic | ICD-10-CM

## 2022-07-26 DIAGNOSIS — R97.20 INCREASED PROSTATE SPECIFIC ANTIGEN (PSA) VELOCITY: Chronic | ICD-10-CM

## 2022-07-26 DIAGNOSIS — E66.9 OBESITY (BMI 30-39.9): Chronic | ICD-10-CM

## 2022-07-26 DIAGNOSIS — E78.2 MIXED HYPERLIPIDEMIA: Chronic | ICD-10-CM

## 2022-07-26 DIAGNOSIS — R73.9 HYPERGLYCEMIA: Chronic | ICD-10-CM

## 2022-07-26 PROBLEM — F32.9 REACTIVE DEPRESSION: Chronic | Status: RESOLVED | Noted: 2020-09-08 | Resolved: 2022-07-26

## 2022-07-26 PROBLEM — K40.90 LEFT INGUINAL HERNIA: Status: RESOLVED | Noted: 2020-06-01 | Resolved: 2022-07-26

## 2022-07-26 PROCEDURE — G0439 PPPS, SUBSEQ VISIT: HCPCS | Performed by: INTERNAL MEDICINE

## 2022-07-26 PROCEDURE — 1159F MED LIST DOCD IN RCRD: CPT | Performed by: INTERNAL MEDICINE

## 2022-07-26 PROCEDURE — 0051A COVID-19 (PFIZER) 12+ YRS: CPT | Performed by: INTERNAL MEDICINE

## 2022-07-26 PROCEDURE — 1170F FXNL STATUS ASSESSED: CPT | Performed by: INTERNAL MEDICINE

## 2022-07-26 PROCEDURE — 91305 COVID-19 (PFIZER) 12+ YRS: CPT | Performed by: INTERNAL MEDICINE

## 2022-07-26 NOTE — ASSESSMENT & PLAN NOTE
Maintain a low sugar/starch/carbohydrate diet. Weight loss advised.     Wt Readings from Last 3 Encounters:   07/26/22 93.4 kg (206 lb)   03/09/22 91.6 kg (202 lb)   02/08/22 88.9 kg (196 lb)     Body mass index is 35.36 kg/m².

## 2022-07-26 NOTE — ASSESSMENT & PLAN NOTE
Check PSA.     Lab Results   Component Value Date    PSA 2.9 04/02/2021    PSA 5.5 (H) 11/25/2020    PSA 6.420 (H) 09/10/2020

## 2022-07-26 NOTE — PROGRESS NOTES
I N T E R N A L  M E D I C I N E  J U N O H  K I M,  M D      ENCOUNTER DATE:  2022    Nicolás DONALDSON Wayne / 76 y.o. / male        MEDICARE ANNUAL WELLNESS VISIT       Chief Complaint: Medicare Wellness-subsequent (20) and chronic medical problems       Patient's general assessment of his health since a year ago:     - Compared to one year ago, he feels his physical health is about the same without significant change.    - Compared to one year ago, he feels his mental health is about the same without significant change.      HPI for other active medical problems:     Patient is experiencing ongoing weight gain.  He has hyperglycemia and obesity with BMI currently standing at 35.36.  He is relatively sedentary at home and has limited social interactions.  He denies worsening mood symptoms since his wife  in 2019.  Hypertension remains stable on multidrug regimen without significant side effects.  He is on rosuvastatin 10 mg daily for hyperlipidemia.  PSA level has been coming back down close to his baseline.  He denies significant change in urination or gross hematuria.        HISTORY       Recent Hospitalizations:    Recent hospitalization?:     If YES, location, date, and diagnoses:     · Location:   · Date:   · Principle Discharge Dx:   · Secondary Dx:       Patient Care Team:    Patient Care Team:  Eusebio Seaman MD as PCP - General (Internal Medicine)  Teodoro Ling MD as Consulting Physician (Orthopedic Surgery)      Allergies:  Latex    Medications:  Current Outpatient Medications on File Prior to Visit   Medication Sig Dispense Refill   • acetaminophen (TYLENOL) 650 MG 8 hr tablet Take 650 mg by mouth Every 8 (Eight) Hours As Needed.     • amLODIPine (NORVASC) 10 MG tablet TAKE ONE TABLET BY MOUTH DAILY 90 tablet 3   • benazepril (LOTENSIN) 40 MG tablet TAKE ONE TABLET BY MOUTH DAILY 90 tablet 0   • doxazosin (CARDURA) 2 MG tablet TAKE ONE TABLET BY MOUTH ONCE NIGHTLY 90 tablet 0   •  PAST MEDICAL & SURGICAL HISTORY:  Osteoporosis  CP (cerebral palsy)  Scoliosis  Mentally challenged  Epilepsy  COPD (chronic obstructive pulmonary disease)  PVD (peripheral vascular disease)  Osteoporosis  Chest pain  Scoliosis  Seizure  MR (mental retardation), severe: immobilty sec to severe dissability  No significant past surgical history      MEDICATIONS  (STANDING):  acetaminophen  Suppository .. 325 milliGRAM(s) Rectal once  ALBUTerol    90 MICROgram(s) HFA Inhaler 2 Puff(s) Inhalation every 6 hours  apixaban 5 milliGRAM(s) Oral two times a day  ascorbic acid 500 milliGRAM(s) Oral two times a day  calcium carbonate 1250 mG  + Vitamin D (OsCal 500 + D) 1 Tablet(s) Oral daily  cholecalciferol 2000 Unit(s) Oral two times a day  diVALproex Sprinkle 375 milliGRAM(s) Oral at bedtime  diVALproex Sprinkle 250 milliGRAM(s) Oral daily  furosemide    Tablet 20 milliGRAM(s) Oral daily  lactulose Syrup 10 Gram(s) Oral three times a day  latanoprost 0.005% Ophthalmic Solution 1 Drop(s) Both EYES at bedtime  midodrine. 2.5 milliGRAM(s) Oral three times a day  montelukast 10 milliGRAM(s) Oral at bedtime  pantoprazole   Suspension 40 milliGRAM(s) Oral daily  potassium chloride   Powder 40 milliEquivalent(s) Oral once  senna 2 Tablet(s) Oral at bedtime  sodium chloride 0.9%. 1000 milliLiter(s) (50 mL/Hr) IV Continuous <Continuous>  tiotropium 18 MICROgram(s) Capsule 1 Capsule(s) Inhalation daily  verapamil 40 milliGRAM(s) Oral two times a day    MEDICATIONS  (PRN):  acetaminophen  Suppository .. 650 milliGRAM(s) Rectal every 4 hours PRN Temp greater or equal to 38C (100.4F)  acetaminophen  Suppository .. 650 milliGRAM(s) Rectal every 4 hours PRN Mild Pain (1 - 3)  LORazepam   Injectable 0.5 milliGRAM(s) IV Push every 3 hours PRN acute seizures      Patient is a 60y old  Female who presents with a chief complaint of covid19+  was sent for low oxygen (30 Mar 2020 15:23)      Vital Signs Last 24 Hrs  T(C): 38.4 (30 Mar 2020 16:49), Max: 38.8 (30 Mar 2020 13:42)  T(F): 101.1 (30 Mar 2020 16:49), Max: 101.9 (30 Mar 2020 13:42)  HR: 106 (30 Mar 2020 16:49) (75 - 116)  BP: 124/68 (30 Mar 2020 16:49) (106/65 - 129/72)  BP(mean): --  RR: 22 (30 Mar 2020 13:04) (18 - 22)  SpO2: 93% (30 Mar 2020 13:04) (92% - 95%)            REVIEW OF SYSTEMS:    Constitutional: No fever, weight loss or fatigue  Eyes: No eye pain, visual disturbances, or discharge  ENT:  No difficulty hearing, tinnitus, vertigo; No sinus or throat pain  Neck: No pain or stiffness  Breasts: No pain, masses or nipple discharge  Respiratory: No cough, wheezing, chills or hemoptysis  Cardiovascular: No chest pain, palpitations, shortness of breath, dizziness or leg swelling  Gastrointestinal: No abdominal or epigastric pain. No nausea, vomiting or hematemesis; No diarrhea or constipation. No melena or hematochezia.  Genitourinary: No dysuria, frequency, hematuria or incontinence  Rectal: No pain, hemorrhoids or incontinence  Neurological: No headaches, memory loss, loss of strength, numbness or tremors  Skin: No itching, burning, rashes or lesions   Lymph Nodes: No enlarged glands  Endocrine: No heat or cold intolerance; No hair loss  Musculoskeletal: No joint pain or swelling; No muscle, back or extremity pain  Psychiatric: No depression, anxiety, mood swings or difficulty sleeping  Heme/Lymph: No easy bruising or bleeding gums  Allergy and Immunologic: No hives or eczema    PHYSICAL EXAM:  on venti mask  seems more comfortable  Constitutional: NAD,  Respiratory: occ wheez   Cardiovascular:tachy  Gastrointestinal: BS+, soft, NT/ND  Extremities: + peripheral edema.chronic  Neurological: A/O, no focal deficits  Skin: No rashes      decubiti: none                          13.2   6.90  )-----------( 219      ( 30 Mar 2020 14:26 )             40.3     03-30    145  |  111<H>  |  7   ----------------------------<  87  4.0   |  28  |  0.41<L>    Ca    8.3<L>      30 Mar 2020 14:26    TPro  6.6  /  Alb  2.7<L>  /  TBili  0.3  /  DBili  x   /  AST  35  /  ALT  24  /  AlkPhos  49  03-30    PT/INR - ( 29 Mar 2020 00:27 )   PT: 12.5 sec;   INR: 1.11 ratio         PTT - ( 29 Mar 2020 00:27 )  PTT:25.1 sec        03-30 @ 14:26  -  BNP:  3539  .Blood Blood-Peripheral  03-29 @ 10:42   No growth to date.  --  --        Urine Culture:  03-29 @ 10:42    --        No growth to date.        Radiology: metoprolol succinate XL (TOPROL-XL) 50 MG 24 hr tablet TAKE ONE TABLET BY MOUTH TWICE A  tablet 3   • pantoprazole (PROTONIX) 40 MG EC tablet Take 1 tablet by mouth Daily. 90 tablet 3   • rosuvastatin (CRESTOR) 10 MG tablet TAKE ONE TABLET BY MOUTH ONCE NIGHTLY 90 tablet 3   • Selenium 200 MCG capsule Take 200 mcg by mouth Daily.     • Multiple Vitamins-Minerals (MULTIVITAMIN ADULT PO) Take 1 tablet by mouth Daily.     • [DISCONTINUED] hydrOXYzine (ATARAX) 25 MG tablet Take 1 tablet by mouth Every 8 (Eight) Hours As Needed for Itching. 45 tablet 0     No current facility-administered medications on file prior to visit.        PFSH:     The following portions of the patient's history were reviewed and updated as appropriate: Allergies / Current Medications / Past Medical History / Surgical History / Social History / Family History    Problem List:  Patient Active Problem List   Diagnosis   • Hypertension   • Mixed hyperlipidemia   • Obesity (BMI 30-39.9)   • Hyperglycemia   • SARAH (obstructive sleep apnea)   • Increased prostate specific antigen (PSA) velocity   • Severe eczema       Past Medical History:  Past Medical History:   Diagnosis Date   • Arthritis    • Asthma     remote history   • Broken legs    • Deep vein thrombosis (HCC)     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 of 18   • Depression    • Diverticulitis    • Diverticulosis    • Elevated PSA    • GERD (gastroesophageal reflux disease)    • Hearing loss    • Hyperlipidemia    • Hypertension    • Inguinal hernia    • Peptic ulcer    • Reactive depression 9/8/2020    Wife Surya passed away 2019   • Rheumatoid arthritis (HCC)    • Skin cancer    • Sleep apnea        Past Surgical History:  Past Surgical History:   Procedure Laterality Date   • COLONOSCOPY N/A 9/27/2017    Procedure: COLONOSCOPY into cecum and TI;  Surgeon: Braydon DHALIWAL MD;  Location: [facility] ENDOSCOPY;  Service:    • CRANIOTOMY  1963    age of 18y ; blood clot    • ENDOSCOPY N/A 9/26/2017     Procedure: ESOPHAGOGASTRODUODENOSCOPY WITH BOPSIES;  Surgeon: Hunter Clayton MD;  Location: [facility] ENDOSCOPY;  Service:    • ENDOSCOPY N/A 2017    Procedure: ESOPHAGOGASTRODUODENOSCOPY with biopsies;  Surgeon: Hunter Clayton MD;  Location: [facility] ENDOSCOPY;  Service:    • HIP ARTHROPLASTY Right 2018   • INGUINAL HERNIA REPAIR Left 2020    Procedure: left INGUINAL HERNIA REPAIR LAPAROSCOPIC WITH DAVINCI ROBOT;  Surgeon: Donavon Monae MD;  Location: [facility] MAIN OR;  Service: DaVinci;  Laterality: Left;   • KNEE ACL RECONSTRUCTION Right    • MOLE REMOVAL  2020   • PROSTATE BIOPSY      ~   • TOTAL HIP ARTHROPLASTY Left 2018   • VASECTOMY         Social History:  Social History     Socioeconomic History   • Marital status:      Spouse name: Tony ()   • Number of children: 2   Tobacco Use   • Smoking status: Former Smoker     Years: 30.00     Types: Cigarettes     Quit date:      Years since quittin.5   • Smokeless tobacco: Never Used   • Tobacco comment: quit 20 years ago   Vaping Use   • Vaping Use: Never used   Substance and Sexual Activity   • Alcohol use: Yes     Comment: 1-2 days (2 drinks)   • Drug use: Not Currently     Types: Marijuana   • Sexual activity: Not Currently       Family History:  Family History   Problem Relation Age of Onset   • Lung cancer Mother         smoker;  age 64   • Anxiety disorder Mother    • Depression Mother    • Alcohol abuse Mother    • Heart attack Father 64   • Alcohol abuse Father    • Liver cancer Father    • Urolithiasis Other         all siblings   • Esophageal cancer Brother    • Depression Brother    • Hypertension Brother    • Hyperlipidemia Brother    • Diabetes Brother    • Kidney disease Brother    • Hypertension Sister    • Hyperlipidemia Sister    • Colon cancer Neg Hx    • Prostate cancer Neg Hx    • Dementia Neg Hx    • Malig Hyperthermia Neg Hx          PATIENT ASSESSMENT     Vitals:  Vitals:     "07/26/22 1257   BP: 110/70   BP Location: Left arm   Pulse: 68   Temp: 97.3 °F (36.3 °C)   SpO2: 98%   Weight: 93.4 kg (206 lb)   Height: 162.6 cm (64\")       BP Readings from Last 3 Encounters:   07/26/22 110/70   03/09/22 126/74   02/08/22 120/76     Wt Readings from Last 3 Encounters:   07/26/22 93.4 kg (206 lb)   03/09/22 91.6 kg (202 lb)   02/08/22 88.9 kg (196 lb)      Body mass index is 35.36 kg/m².    Blood pressure readings recorded on patient flowsheet:  No flowsheet data found.         Review of Systems:    Review of Systems  Constitutional neg except per HPI   Resp neg  CV neg   GI negative   Psych negative for worsening depression     Physical Exam:    Physical Exam  General: No acute distress, weight gain noted   Psych: Normal thought and judgment   Cardiovascular Rate: normal. Rhythm: regular. Heart sounds: normal.   Pulm/Chest: Effort normal, breath sounds normal.     Reviewed Data:    Labs:   Lab Results   Component Value Date     (H) 03/02/2022    K 4.3 03/02/2022    CALCIUM 9.5 03/02/2022    AST 29 03/02/2022    ALT 38 03/02/2022    BUN 17 03/02/2022    CREATININE 1.25 03/02/2022    CREATININE 1.72 (H) 02/08/2022    CREATININE 1.22 04/02/2021    EGFRIFNONA 38 (L) 02/08/2022    EGFRIFAFRI 44 (L) 02/08/2022       Lab Results   Component Value Date     (H) 07/24/2018     (H) 07/02/2018     (H) 02/28/2018    HGBA1C 5.60 09/10/2020    HGBA1C 5.70 (H) 03/10/2020    HGBA1C 5.3 02/28/2018       Lab Results   Component Value Date     (H) 02/08/2022    LDL 64 04/02/2021     (H) 03/10/2020    HDL 39 (L) 02/08/2022    TRIG 379 (H) 02/08/2022    CHOLHDLRATIO 7.7 (H) 02/08/2022       Lab Results   Component Value Date    TSH 3.780 09/10/2020    FREET4 1.41 09/10/2020          Lab Results   Component Value Date    WBC 8.62 06/09/2020    HGB 15.9 06/09/2020    HGB 17.4 11/17/2019    HGB 17.7 09/12/2019     06/09/2020                   Lab Results   Component Value " Date    PSA 2.9 04/02/2021    PSA 5.5 (H) 11/25/2020    PSA 6.420 (H) 09/10/2020       Imaging:          Medical Tests:          Screening for Glaucoma:  Previous screening for glaucoma?: Yes      Hearing Loss Screen:  Finger Rub Hearing Test (right ear): passed  Finger Rub Hearing Test (left ear): passed      Urinary Incontinence Screen:  Episodes of urinary incontinence? : No      Depression Screen:  PHQ-2/PHQ-9 Depression Screening 7/26/2022   Retired PHQ-9 Total Score -   Retired Total Score -   Little Interest or Pleasure in Doing Things 0-->not at all   Feeling Down, Depressed or Hopeless 0-->not at all   PHQ-9: Brief Depression Severity Measure Score 0        PHQ-2: 0 (Not depressed)    PHQ-9: 0 (Negative screening for depression)       FUNCTIONAL, FALL RISK, & COGNITIVE SCREENING (Components below):    DATA:    Functional & Cognitive Status 7/26/2022   Do you have difficulty preparing food and eating? No   Do you have difficulty bathing yourself, getting dressed or grooming yourself? No   Do you have difficulty using the toilet? No   Do you have difficulty moving around from place to place? No   Do you have trouble with steps or getting out of a bed or a chair? No   Current Diet Well Balanced Diet   Dental Exam Up to date   Eye Exam Not up to date   Exercise (times per week) 1 times per week   Current Exercises Include Walking   Current Exercise Activities Include -   Do you need help using the phone?  No   Are you deaf or do you have serious difficulty hearing?  Yes   Do you need help with transportation? No   Do you need help shopping? No   Do you need help preparing meals?  No   Do you need help with housework?  No   Do you need help with laundry? No   Do you need help taking your medications? No   Do you need help managing money? No   Do you ever drive or ride in a car without wearing a seat belt? No   Have you felt unusual stress, anger or loneliness in the last month? -   Who do you live with? -   If  you need help, do you have trouble finding someone available to you? -   Have you been bothered in the last four weeks by sexual problems? -   Do you have difficulty concentrating, remembering or making decisions? No         A) Assessment of Functional Ability:  (Assessment of ability to perform ADL's (showering/bathing, using toilet, dressing, feeding self, moving self around) and IADL's (use telephone, shop, prepare food, housekeep, do laundry, transport independently, take medications independently, and handle finances)    Degree of functional impairment: MILD (based on assessment noted above)      B) Assessment of Fall Risk:  Fall Risk Assessment was completed, and patient is at low risk for falls.       Need for further evaluation of gait, strength, and balance? : No    Timed Up and Go (TUG):   (>= 12 seconds indicates high risk for falling)    Observable abnormalities included: Normal gait pattern       C. Assessment of Cognitive Function:    Mini-Cog Test:     1) Registration (3 objects): Yes   2) Number of objects recalled: 3   3) Clock Draw: Passed? : N/A       Further evaluation required? : No        COUNSELING       A. Identification of Health Risk Factors:    Risk factors include: cardiovascular risk factors, history of tobacco use and obesity      B. Age-Appropriate Screening Schedule:  (Refer to the list below for future screening recommendations based on patient's age, sex and/or medical conditions. Orders for these recommended tests are listed in the plan section. The patient has been provided with a written plan)    Health Maintenance Topics  Health Maintenance   Topic Date Due   • PROSTATE CANCER SCREENING  04/02/2022   • ZOSTER VACCINE (2 of 2) 07/26/2022 (Originally 5/13/2015)   • INFLUENZA VACCINE  10/01/2022   • LIPID PANEL  02/08/2023   • TDAP/TD VACCINES (3 - Td or Tdap) 10/01/2026       Health Maintenance Topics Due or Over-Due  Health Maintenance Due   Topic Date Due   • Pneumococcal  Vaccine 65+ (2 - PCV) 09/21/2017   • PROSTATE CANCER SCREENING  04/02/2022   • COVID-19 Vaccine (4 - Booster for Pfizer series) 05/07/2022         C. Advanced Care Planning:    Advance Care Planning   ACP discussion was held with the patient during this visit. Patient has an advance directive in EMR which is still valid.        D. Patient Self-Management and Personalized Health Advice:    He has been provided with personalized counseling/information (including brochures/handouts) about:     -- optimizing diet/nutrition plans, improving exercise / conditioning, weight management, reducing risk for cardiovascular disease (heart, stroke, vascular) and management of sleep disorders      He has been recommended for the following preventative services which has been performed today, will be ordered today or ordered/performed on upcoming follow-up visit:     -- NUTRITION counseling provided, EXERCISE counseling provided, CARDIOVASCULAR disease risk reduction counseling performed, FALL RISK assessment / plan of care completed, URINARY incontinence assessment done, PROSTATE CANCER screening (discussed and PSA ordered +/- SANDRA performed), **COLORECTAL cancer screening (colonoscopy/Cologuard discussed or ordered), vaccination for PNEUMOVAX/PCV administered (or recommended), vaccination for SHINGRIX administered  (or recommended), COVID-19 vaccination (and/or booster) recommendations provided      E. Miscellaneous Items:    -Aspirin use counseling: Does not need ASA (and currently is not on it)    -Discussed BMI with him. The BMI is above average; BMI management plan is completed (discussed plans for weight loss)    -Reviewed use of high risk medication in the elderly: YES    -Reviewed for potential of harmful drug interactions in the elderly: YES        WRAP UP       Assessment & Plan:    1) MEDICARE ANNUAL WELLNESS VISIT    2) OTHER MEDICAL CONDITIONS ADDRESSED TODAY:            Problem List Items Addressed This Visit         High    Mixed hyperlipidemia (Chronic)    Overview     Continue rosuvastatin 10 mg qd.            Current Assessment & Plan     Previously LDL was noted to be significantly higher.  He probably was not taking his medication regularly at that time.  He reports to having been taking rosuvastatin daily since last visit.  Check lipid panel and continue rosuvastatin 10 mg.    Lab Results   Component Value Date     (H) 02/08/2022    LDL 64 04/02/2021     (H) 03/10/2020     Lab Results   Component Value Date    HDL 39 (L) 02/08/2022    HDL 41 04/02/2021    HDL 47 03/10/2020     Lab Results   Component Value Date    TRIG 379 (H) 02/08/2022    TRIG 394 (H) 04/02/2021    TRIG 208 (H) 03/10/2020     Lab Results   Component Value Date    CHOLHDLRATIO 7.7 (H) 02/08/2022    CHOLHDLRATIO 4.05 04/02/2021    CHOLHDLRATIO 4.19 03/10/2020               Relevant Medications    rosuvastatin (CRESTOR) 10 MG tablet    Other Relevant Orders    Comprehensive Metabolic Panel    Lipid Panel With / Chol / HDL Ratio       Medium    Hypertension (Chronic)    Overview     Continue metoprolol XL 50 mg twice daily, doxazosin 2 mg nightly, benazepril 4 mg daily and amlodipine 10 mg daily           Relevant Medications    amLODIPine (NORVASC) 10 MG tablet    metoprolol succinate XL (TOPROL-XL) 50 MG 24 hr tablet    benazepril (LOTENSIN) 40 MG tablet    doxazosin (CARDURA) 2 MG tablet    Other Relevant Orders    Comprehensive Metabolic Panel       Low    Obesity (BMI 30-39.9) (Chronic)    Current Assessment & Plan     Maintain a low sugar/starch/carbohydrate diet. Weight loss advised.     Wt Readings from Last 3 Encounters:   07/26/22 93.4 kg (206 lb)   03/09/22 91.6 kg (202 lb)   02/08/22 88.9 kg (196 lb)     Body mass index is 35.36 kg/m².              Hyperglycemia (Chronic)    Current Assessment & Plan     Check A1c. Maintain a low sugar/starch/carbohydrate diet. Weight loss advised.     Lab Results   Component Value Date    HGBA1C  5.60 09/10/2020    HGBA1C 5.70 (H) 03/10/2020    HGBA1C 5.3 02/28/2018               Relevant Orders    Hemoglobin A1c    Increased prostate specific antigen (PSA) velocity (Chronic)    Current Assessment & Plan     Check PSA.     Lab Results   Component Value Date    PSA 2.9 04/02/2021    PSA 5.5 (H) 11/25/2020    PSA 6.420 (H) 09/10/2020               Relevant Orders    PSA DIAGNOSTIC      Other Visit Diagnoses     Medicare annual wellness visit, subsequent    -  Primary                    Orders Placed This Encounter   Procedures   • COVID-19 Vaccine (Pfizer) Gray Cap   • Comprehensive Metabolic Panel   • Hemoglobin A1c   • Lipid Panel With / Chol / HDL Ratio   • PSA DIAGNOSTIC       Discussion / Summary:        Medications as of TODAY:              Current Outpatient Medications   Medication Sig Dispense Refill   • acetaminophen (TYLENOL) 650 MG 8 hr tablet Take 650 mg by mouth Every 8 (Eight) Hours As Needed.     • amLODIPine (NORVASC) 10 MG tablet TAKE ONE TABLET BY MOUTH DAILY 90 tablet 3   • benazepril (LOTENSIN) 40 MG tablet TAKE ONE TABLET BY MOUTH DAILY 90 tablet 0   • doxazosin (CARDURA) 2 MG tablet TAKE ONE TABLET BY MOUTH ONCE NIGHTLY 90 tablet 0   • metoprolol succinate XL (TOPROL-XL) 50 MG 24 hr tablet TAKE ONE TABLET BY MOUTH TWICE A  tablet 3   • pantoprazole (PROTONIX) 40 MG EC tablet Take 1 tablet by mouth Daily. 90 tablet 3   • rosuvastatin (CRESTOR) 10 MG tablet TAKE ONE TABLET BY MOUTH ONCE NIGHTLY 90 tablet 3   • Selenium 200 MCG capsule Take 200 mcg by mouth Daily.     • Multiple Vitamins-Minerals (MULTIVITAMIN ADULT PO) Take 1 tablet by mouth Daily.       No current facility-administered medications for this visit.         FOLLOW-UP:            Return in about 6 months (around 1/26/2023) for Hypertension, Hyperlipidemia, Schedule AWV with DR. CONKLIN for next year.               No future appointments.        After Visit Summary (AVS) including the Personalized Prevention  Plan Services  (PPPS) was either printed and given to the patient at check-out today and/or sent to [facility] for review.         *Examiner was wearing KN95 mask and eye protection during the entire duration of the visit. Patient was masked the entire time. Minimum social distance of 6 ft maintained entire visit except if physical contact was necessary as documented.       Template created by Panda Seaman MD

## 2022-07-26 NOTE — ASSESSMENT & PLAN NOTE
Check A1c. Maintain a low sugar/starch/carbohydrate diet. Weight loss advised.     Lab Results   Component Value Date    HGBA1C 5.60 09/10/2020    HGBA1C 5.70 (H) 03/10/2020    HGBA1C 5.3 02/28/2018

## 2022-07-26 NOTE — ASSESSMENT & PLAN NOTE
Previously LDL was noted to be significantly higher.  He probably was not taking his medication regularly at that time.  He reports to having been taking rosuvastatin daily since last visit.  Check lipid panel and continue rosuvastatin 10 mg.    Lab Results   Component Value Date     (H) 02/08/2022    LDL 64 04/02/2021     (H) 03/10/2020     Lab Results   Component Value Date    HDL 39 (L) 02/08/2022    HDL 41 04/02/2021    HDL 47 03/10/2020     Lab Results   Component Value Date    TRIG 379 (H) 02/08/2022    TRIG 394 (H) 04/02/2021    TRIG 208 (H) 03/10/2020     Lab Results   Component Value Date    CHOLHDLRATIO 7.7 (H) 02/08/2022    CHOLHDLRATIO 4.05 04/02/2021    CHOLHDLRATIO 4.19 03/10/2020

## 2022-07-27 ENCOUNTER — DOCUMENTATION (OUTPATIENT)
Dept: INTERNAL MEDICINE | Age: 77
End: 2022-07-27

## 2022-07-27 DIAGNOSIS — E78.2 MIXED HYPERLIPIDEMIA: Primary | ICD-10-CM

## 2022-07-27 DIAGNOSIS — R73.9 HYPERGLYCEMIA: ICD-10-CM

## 2022-07-27 LAB
ALBUMIN SERPL-MCNC: 4.4 G/DL (ref 3.7–4.7)
ALBUMIN/GLOB SERPL: 1.8 {RATIO} (ref 1.2–2.2)
ALP SERPL-CCNC: 73 IU/L (ref 44–121)
ALT SERPL-CCNC: 32 IU/L (ref 0–44)
AST SERPL-CCNC: 23 IU/L (ref 0–40)
BILIRUB SERPL-MCNC: 0.5 MG/DL (ref 0–1.2)
BUN SERPL-MCNC: 20 MG/DL (ref 8–27)
BUN/CREAT SERPL: 16 (ref 10–24)
CALCIUM SERPL-MCNC: 9.6 MG/DL (ref 8.6–10.2)
CHLORIDE SERPL-SCNC: 108 MMOL/L (ref 96–106)
CHOLEST SERPL-MCNC: 201 MG/DL (ref 100–199)
CHOLEST/HDLC SERPL: 5.9 RATIO (ref 0–5)
CO2 SERPL-SCNC: 23 MMOL/L (ref 20–29)
CREAT SERPL-MCNC: 1.22 MG/DL (ref 0.76–1.27)
EGFRCR SERPLBLD CKD-EPI 2021: 62 ML/MIN/1.73
GLOBULIN SER CALC-MCNC: 2.5 G/DL (ref 1.5–4.5)
GLUCOSE SERPL-MCNC: 114 MG/DL (ref 65–99)
HBA1C MFR BLD: 5.9 % (ref 4.8–5.6)
HDLC SERPL-MCNC: 34 MG/DL
LDLC SERPL CALC-MCNC: 122 MG/DL (ref 0–99)
POTASSIUM SERPL-SCNC: 4.4 MMOL/L (ref 3.5–5.2)
PROT SERPL-MCNC: 6.9 G/DL (ref 6–8.5)
PSA SERPL-MCNC: 2.7 NG/ML (ref 0–4)
SODIUM SERPL-SCNC: 145 MMOL/L (ref 134–144)
TRIGL SERPL-MCNC: 254 MG/DL (ref 0–149)
VLDLC SERPL CALC-MCNC: 45 MG/DL (ref 5–40)

## 2022-07-27 NOTE — PROGRESS NOTES
CALL PATIENT with results:    A1c level for blood sugar average is little higher but overall in decent range.     LDL (bad cholesterol) is significantly better but remains high.     PSA (for prostate cancer screening) is overall stable     **Schedule for fasting lab appt 1 week prior to followup in 6 months.

## 2022-08-19 DIAGNOSIS — I10 ESSENTIAL HYPERTENSION: Chronic | ICD-10-CM

## 2022-08-19 RX ORDER — BENAZEPRIL HYDROCHLORIDE 40 MG/1
40 TABLET, FILM COATED ORAL DAILY
Qty: 90 TABLET | Refills: 3 | Status: SHIPPED | OUTPATIENT
Start: 2022-08-19

## 2022-09-14 RX ORDER — PANTOPRAZOLE SODIUM 40 MG/1
TABLET, DELAYED RELEASE ORAL
Qty: 90 TABLET | Refills: 3 | Status: SHIPPED | OUTPATIENT
Start: 2022-09-14

## 2022-09-20 ENCOUNTER — OFFICE VISIT (OUTPATIENT)
Dept: INTERNAL MEDICINE | Age: 77
End: 2022-09-20

## 2022-09-20 VITALS
DIASTOLIC BLOOD PRESSURE: 82 MMHG | WEIGHT: 207 LBS | HEIGHT: 64 IN | TEMPERATURE: 97.7 F | HEART RATE: 64 BPM | BODY MASS INDEX: 35.34 KG/M2 | OXYGEN SATURATION: 97 % | SYSTOLIC BLOOD PRESSURE: 110 MMHG

## 2022-09-20 DIAGNOSIS — E78.2 MIXED HYPERLIPIDEMIA: Chronic | ICD-10-CM

## 2022-09-20 DIAGNOSIS — I10 PRIMARY HYPERTENSION: Primary | Chronic | ICD-10-CM

## 2022-09-20 PROCEDURE — G0009 ADMIN PNEUMOCOCCAL VACCINE: HCPCS | Performed by: INTERNAL MEDICINE

## 2022-09-20 PROCEDURE — 99214 OFFICE O/P EST MOD 30 MIN: CPT | Performed by: INTERNAL MEDICINE

## 2022-09-20 PROCEDURE — 90677 PCV20 VACCINE IM: CPT | Performed by: INTERNAL MEDICINE

## 2022-09-20 NOTE — PATIENT INSTRUCTIONS
** IMPORTANT MESSAGE FROM DR. CONKLIN **    In our office, your satisfaction is VERY important to us.     You may receive a survey from [employer] by mail or E-mail for you to provide feedback about your visit. This information is invaluable for me to know what we can do to improve our services.     I ask that you please take a few minutes to complete the survey and let us know how we are doing in serving your needs. (You may receive the survey more than once for multiple visits)    Thank You !    Dr. Conklin    _________________________________________________________________________________________________________________________      ** ADDITIONAL INSTRUCTION / REMINDERS FROM DR. CONKLIN **    Get your FLU shot at the pharmacy 1st week of October.     Remember to get the NEW COVID BOOSTER SHOT IN NOVEMBER AT YOUR PHARMACY.     Get your Shingrix vaccines (2 shot series) at your pharmacy at any time.

## 2022-09-20 NOTE — PROGRESS NOTES
"    I N T E R N A L  M E D I C I N E  J U N O H  K I M,  M D      ENCOUNTER DATE:  09/20/2022    Nicolás DONALDSON Wayne / 77 y.o. / male      CHIEF COMPLAINT / REASON FOR OFFICE VISIT     Hyperlipidemia and Hypertension      ASSESSMENT & PLAN     Problem List Items Addressed This Visit        High    Hypertension - Primary (Chronic)    Overview     Continue metoprolol XL 50 mg twice daily, doxazosin 2 mg nightly, benazepril 4 mg daily and amlodipine 10 mg daily         Relevant Medications    amLODIPine (NORVASC) 10 MG tablet    metoprolol succinate XL (TOPROL-XL) 50 MG 24 hr tablet    doxazosin (CARDURA) 2 MG tablet    benazepril (LOTENSIN) 40 MG tablet    Mixed hyperlipidemia (Chronic)    Overview     Continue rosuvastatin 10 mg qd.          Relevant Medications    rosuvastatin (CRESTOR) 10 MG tablet        Orders Placed This Encounter   Procedures   • Pneumococcal Conjugate Vaccine 20-Valent All     No orders of the defined types were placed in this encounter.      SUMMARY/DISCUSSION  •       Next Appointment with me: 1/27/2023    Return for Next scheduled follow up.      VITAL SIGNS     Vitals:    09/20/22 1302   BP: 110/82   BP Location: Left arm   Pulse: 64   Temp: 97.7 °F (36.5 °C)   SpO2: 97%   Weight: 93.9 kg (207 lb)   Height: 162.6 cm (64\")       BP Readings from Last 3 Encounters:   09/20/22 110/82   07/26/22 110/70   03/09/22 126/74     Wt Readings from Last 3 Encounters:   09/20/22 93.9 kg (207 lb)   07/26/22 93.4 kg (206 lb)   03/09/22 91.6 kg (202 lb)     Body mass index is 35.53 kg/m².    Blood pressure readings recorded on patient flowsheet:  No flowsheet data found.       MEDICATIONS AT THE TIME OF OFFICE VISIT     Current Outpatient Medications on File Prior to Visit   Medication Sig   • acetaminophen (TYLENOL) 650 MG 8 hr tablet Take 650 mg by mouth Every 8 (Eight) Hours As Needed.   • amLODIPine (NORVASC) 10 MG tablet TAKE ONE TABLET BY MOUTH DAILY   • benazepril (LOTENSIN) 40 MG tablet Take 1 tablet " by mouth Daily.   • doxazosin (CARDURA) 2 MG tablet TAKE ONE TABLET BY MOUTH ONCE NIGHTLY   • metoprolol succinate XL (TOPROL-XL) 50 MG 24 hr tablet TAKE ONE TABLET BY MOUTH TWICE A DAY   • pantoprazole (PROTONIX) 40 MG EC tablet TAKE ONE TABLET BY MOUTH DAILY   • rosuvastatin (CRESTOR) 10 MG tablet TAKE ONE TABLET BY MOUTH ONCE NIGHTLY   • Selenium 200 MCG capsule Take 200 mcg by mouth Daily.   • [DISCONTINUED] Multiple Vitamins-Minerals (MULTIVITAMIN ADULT PO) Take 1 tablet by mouth Daily.     No current facility-administered medications on file prior to visit.          HISTORY OF PRESENT ILLNESS     Hypertension remains stable on multidrug regimen.  Denies chest pains, vision change or headaches.  Denies excessive fatigue or lightheadedness.  He is on rosuvastatin 10 mg and he has been better about taking the medication on regular basis.  Previous LDL level was still above 120.  He continues to gain weight and his BMI currently stands at 35.53 and weight of 207 pounds.  He has not been watching his diet closely.      Patient Care Team:  Eusebio Seaman MD as PCP - General (Internal Medicine)  Teodoro Ling MD as Consulting Physician (Orthopedic Surgery)    REVIEW OF SYSTEMS     Review of Systems   Constitutional neg except per HPI   Resp neg  CV neg     PHYSICAL EXAMINATION     Physical Exam  General: No acute distress  Psych: Normal thought and judgment   Cardiovascular Rate: normal. Rhythm: regular. Heart sounds: normal.   Pulm/Chest: Effort normal, breath sounds normal.       REVIEWED DATA     Labs:     Lab Results   Component Value Date     (H) 07/26/2022    K 4.4 07/26/2022    CALCIUM 9.6 07/26/2022    AST 23 07/26/2022    ALT 32 07/26/2022    BUN 20 07/26/2022    CREATININE 1.22 07/26/2022    CREATININE 1.25 03/02/2022    CREATININE 1.72 (H) 02/08/2022    EGFRIFNONA 38 (L) 02/08/2022    EGFRIFAFRI 44 (L) 02/08/2022       Lab Results   Component Value Date    HGBA1C 5.9 (H) 07/26/2022    HGBA1C  5.60 09/10/2020    HGBA1C 5.70 (H) 03/10/2020       Lab Results   Component Value Date     (H) 07/26/2022     (H) 02/08/2022    LDL 64 04/02/2021    HDL 34 (L) 07/26/2022    HDL 39 (L) 02/08/2022    TRIG 254 (H) 07/26/2022    TRIG 379 (H) 02/08/2022       Lab Results   Component Value Date    TSH 3.780 09/10/2020    TSH 3.230 04/17/2019    TSH 4.270 (H) 02/08/2018    FREET4 1.41 09/10/2020    FREET4 1.17 04/17/2019    FREET4 1.18 02/08/2018       Lab Results   Component Value Date    WBC 8.62 06/09/2020    HGB 15.9 06/09/2020     06/09/2020     Lab Results   Component Value Date    PSA 2.7 07/26/2022    PSA 2.9 04/02/2021    PSA 5.5 (H) 11/25/2020        No results found for: MALBCRERATIO       Imaging:           Medical Tests:           Summary of old records / correspondence / consultant report:           Request outside records:             *Examiner was wearing KN95 mask and eye protection during the entire duration of the visit. Patient was masked the entire time. Minimum social distance of 6 ft maintained entire visit except if physical contact was necessary as documented.       Template created by Panda Seaman MD

## 2022-10-13 DIAGNOSIS — I10 ESSENTIAL HYPERTENSION: Chronic | ICD-10-CM

## 2022-10-13 RX ORDER — DOXAZOSIN 2 MG/1
2 TABLET ORAL NIGHTLY
Qty: 90 TABLET | Refills: 0 | Status: SHIPPED | OUTPATIENT
Start: 2022-10-13 | End: 2023-01-11

## 2023-01-11 DIAGNOSIS — I10 ESSENTIAL HYPERTENSION: Chronic | ICD-10-CM

## 2023-01-11 RX ORDER — DOXAZOSIN 2 MG/1
TABLET ORAL
Qty: 90 TABLET | Refills: 3 | Status: SHIPPED | OUTPATIENT
Start: 2023-01-11

## 2023-01-17 ENCOUNTER — TELEPHONE (OUTPATIENT)
Dept: INTERNAL MEDICINE | Age: 78
End: 2023-01-17
Payer: MEDICARE

## 2023-01-17 NOTE — TELEPHONE ENCOUNTER
Patient has body aches, coughing up mucus, no fever, nasal congestion, symptoms x 1 week.   Would like a prescription called in if possible    3

## 2023-01-19 ENCOUNTER — OFFICE VISIT (OUTPATIENT)
Dept: INTERNAL MEDICINE | Age: 78
End: 2023-01-19
Payer: MEDICARE

## 2023-01-19 ENCOUNTER — HOSPITAL ENCOUNTER (OUTPATIENT)
Dept: GENERAL RADIOLOGY | Facility: HOSPITAL | Age: 78
Discharge: HOME | End: 2023-01-19
Admitting: NURSE PRACTITIONER
Payer: MEDICARE

## 2023-01-19 VITALS
BODY MASS INDEX: 34.69 KG/M2 | HEART RATE: 88 BPM | HEIGHT: 64 IN | OXYGEN SATURATION: 95 % | WEIGHT: 203.2 LBS | SYSTOLIC BLOOD PRESSURE: 118 MMHG | TEMPERATURE: 97.8 F | DIASTOLIC BLOOD PRESSURE: 72 MMHG

## 2023-01-19 DIAGNOSIS — J18.9 COMMUNITY ACQUIRED PNEUMONIA OF RIGHT LOWER LOBE OF LUNG: Primary | ICD-10-CM

## 2023-01-19 LAB
EXPIRATION DATE: NORMAL
FLUAV AG UPPER RESP QL IA.RAPID: NOT DETECTED
FLUBV AG UPPER RESP QL IA.RAPID: NOT DETECTED
INTERNAL CONTROL: NORMAL
Lab: NORMAL
SARS-COV-2 AG UPPER RESP QL IA.RAPID: NOT DETECTED

## 2023-01-19 PROCEDURE — 99213 OFFICE O/P EST LOW 20 MIN: CPT | Performed by: NURSE PRACTITIONER

## 2023-01-19 PROCEDURE — 71046 X-RAY EXAM CHEST 2 VIEWS: CPT

## 2023-01-19 PROCEDURE — 87428 SARSCOV & INF VIR A&B AG IA: CPT | Performed by: NURSE PRACTITIONER

## 2023-01-19 RX ORDER — GUAIFENESIN AND DEXTROMETHORPHAN HYDROBROMIDE 600; 30 MG/1; MG/1
1 TABLET, EXTENDED RELEASE ORAL 2 TIMES DAILY PRN
Qty: 28 TABLET | Refills: 0 | Status: SHIPPED | OUTPATIENT
Start: 2023-01-19 | End: 2023-02-02

## 2023-01-19 RX ORDER — FLUTICASONE PROPIONATE 50 MCG
2 SPRAY, SUSPENSION (ML) NASAL DAILY
Qty: 11.1 ML | Refills: 0 | Status: SHIPPED | OUTPATIENT
Start: 2023-01-19

## 2023-01-19 RX ORDER — DEXTROMETHORPHAN HYDROBROMIDE AND PROMETHAZINE HYDROCHLORIDE 15; 6.25 MG/5ML; MG/5ML
5 SYRUP ORAL 4 TIMES DAILY PRN
Qty: 118 ML | Refills: 0 | Status: SHIPPED | OUTPATIENT
Start: 2023-01-19 | End: 2023-02-22

## 2023-01-19 NOTE — PROGRESS NOTES
"    I N T E R N A L  M E D I C I N E  Christina Carias, APRN    ENCOUNTER DATE:  01/19/2023    Nicolás DONALDSON Wayne / 77 y.o. / male      CHIEF COMPLAINT / REASON FOR OFFICE VISIT     Cough (Yellow mucus, opaque), Generalized Body Aches, Nasal Congestion, and Fatigue      ASSESSMENT & PLAN     Diagnoses and all orders for this visit:    1. Community acquired pneumonia of right lower lobe of lung (Primary)  -     XR Chest PA & Lateral  -     POCT SARS-CoV-2 Antigen MAXIMO + Flu-negative in the office       - Amoxicillin 875 mg 1 tablet two times daily for 10 days and Azithromycin 500 mg daily for 7 days        SUMMARY/DISCUSSION  • Discussion of Pneumonia and Management. Instructed on Flonase, Promethazine, and Amoxicillin and Azithromycin with prescriptions escribed to patient pharmacy  • Instructed to go to the ER if no improvement in 3-5 days or fever >1001  • Follow up with Dr Seaman as scheduled and as needed  • I spent 25 min in direct care of this patient on this date of service. This time includes times spent by me in the following activities: Preparing for the visit, obtaining and/or reviewing a separately obtained history, performing a medically appropriate examination and/or evaluation, reviewing medical records, reviewing tests, ordering medications, tests, or procedures, counseling and educating the patient, documenting information in the medical record and reviewing office note/correspondence from other providers.     Return in about 1 month (around 2/22/2023) for Next scheduled follow up.      VITAL SIGNS     Visit Vitals  /72 (BP Location: Right arm, Patient Position: Sitting, Cuff Size: Adult)   Pulse 88   Temp 97.8 °F (36.6 °C) (Temporal)   Ht 162.6 cm (64\")   Wt 92.2 kg (203 lb 3.2 oz)   SpO2 95%   BMI 34.88 kg/m²           BP Readings from Last 3 Encounters:   01/19/23 118/72   09/20/22 110/82   07/26/22 110/70     Wt Readings from Last 3 Encounters:   01/19/23 92.2 kg (203 lb 3.2 oz)   09/20/22 93.9 kg " (207 lb)   07/26/22 93.4 kg (206 lb)     Body mass index is 34.88 kg/m².    Blood pressure readings recorded on patient flowsheet:  No flowsheet data found.          MEDICATIONS AT THE TIME OF OFFICE VISIT     Current Outpatient Medications on File Prior to Visit   Medication Sig Dispense Refill   • acetaminophen (TYLENOL) 650 MG 8 hr tablet Take 650 mg by mouth Every 8 (Eight) Hours As Needed.     • amLODIPine (NORVASC) 10 MG tablet TAKE ONE TABLET BY MOUTH DAILY 90 tablet 3   • benazepril (LOTENSIN) 40 MG tablet Take 1 tablet by mouth Daily. 90 tablet 3   • doxazosin (CARDURA) 2 MG tablet TAKE ONE TABLET BY MOUTH ONCE NIGHTLY 90 tablet 3   • metoprolol succinate XL (TOPROL-XL) 50 MG 24 hr tablet TAKE ONE TABLET BY MOUTH TWICE A  tablet 3   • pantoprazole (PROTONIX) 40 MG EC tablet TAKE ONE TABLET BY MOUTH DAILY 90 tablet 3   • rosuvastatin (CRESTOR) 10 MG tablet TAKE ONE TABLET BY MOUTH ONCE NIGHTLY 90 tablet 3   • Selenium 200 MCG capsule Take 200 mcg by mouth Daily.       No current facility-administered medications on file prior to visit.        HISTORY OF PRESENT ILLNESS     77 year old male being seen today for complaint of Cough with productive Yellow mucus, Generalized Body Aches, Nasal Congestion, and Fatigue ongoing for past week. States has not taken anything over the counter. States no loss of appetite, fever, or increased shortness of breath.       Patient Care Team:  Eusebio Seaman MD as PCP - General (Internal Medicine)  Teodoro Ling MD as Consulting Physician (Orthopedic Surgery)    REVIEW OF SYSTEMS     Review of Systems   Constitutional: Positive for fatigue. Negative for fever.   HENT: Positive for congestion.    Respiratory: Positive for cough. Negative for shortness of breath.    Musculoskeletal: Positive for myalgias.   Neurological: Negative for dizziness and headaches.          PHYSICAL EXAMINATION     Physical Exam  HENT:      Right Ear: Tympanic membrane normal.      Left  Ear: Tympanic membrane normal.      Nose: Congestion and rhinorrhea present.      Mouth/Throat:      Mouth: Mucous membranes are moist.      Pharynx: Oropharyngeal exudate present. No posterior oropharyngeal erythema.   Cardiovascular:      Rate and Rhythm: Normal rate and regular rhythm.      Pulses: Normal pulses.      Heart sounds: Normal heart sounds.   Pulmonary:      Effort: Pulmonary effort is normal. No respiratory distress.      Breath sounds: Rales present.   Chest:      Chest wall: Tenderness present.   Skin:     General: Skin is warm and dry.   Neurological:      Mental Status: He is alert and oriented to person, place, and time.             REVIEWED DATA     Labs:     Lab Results   Component Value Date     (H) 07/26/2022    K 4.4 07/26/2022    CALCIUM 9.6 07/26/2022    AST 23 07/26/2022    ALT 32 07/26/2022    BUN 20 07/26/2022    CREATININE 1.22 07/26/2022    CREATININE 1.25 03/02/2022    CREATININE 1.72 (H) 02/08/2022    EGFRIFNONA 38 (L) 02/08/2022    EGFRIFAFRI 44 (L) 02/08/2022       Lab Results   Component Value Date    HGBA1C 5.9 (H) 07/26/2022    HGBA1C 5.60 09/10/2020    HGBA1C 5.70 (H) 03/10/2020       Lab Results   Component Value Date     (H) 07/26/2022     (H) 02/08/2022    LDL 64 04/02/2021    HDL 34 (L) 07/26/2022    HDL 39 (L) 02/08/2022    TRIG 254 (H) 07/26/2022    TRIG 379 (H) 02/08/2022       Lab Results   Component Value Date    TSH 3.780 09/10/2020    TSH 3.230 04/17/2019    TSH 4.270 (H) 02/08/2018    FREET4 1.41 09/10/2020    FREET4 1.17 04/17/2019    FREET4 1.18 02/08/2018       Lab Results   Component Value Date    WBC 8.62 06/09/2020    HGB 15.9 06/09/2020     06/09/2020       No results found for: MALBCRERATIO       Imaging:     XR CHEST PA AND LATERAL-     Clinical: Cough and congestion     COMPARISON: None     FINDINGS: Atherosclerotic calcification of the aorta, cardiac size  within normal limits. No pleural effusion or pulmonary edema. There  are  linear areas of scarring versus discoid atelectasis at the left lung  base, suggestion of minimal patchy right lower lobe infiltrates. No  gross consolidation within either lung.     CONCLUSION: Linear areas of discoid atelectasis or scarring at the left  base, subtle infiltrate suggested in the right lower lobe.     This report was finalized on 1/19/2023 8:54 PM by Dr. Alejandro Gilbert M.D.      Medical Tests:           Summary of old records / correspondence / consultant report:           Request outside records:           DOROTA English

## 2023-01-20 PROBLEM — J18.9 COMMUNITY ACQUIRED PNEUMONIA: Status: ACTIVE | Noted: 2023-01-20

## 2023-01-20 RX ORDER — ERYTHROMYCIN STEARATE 250 MG
250 TABLET ORAL 3 TIMES DAILY
Qty: 21 TABLET | Refills: 0 | Status: SHIPPED | OUTPATIENT
Start: 2023-01-20 | End: 2023-01-20 | Stop reason: RX

## 2023-01-20 RX ORDER — AZITHROMYCIN 500 MG/1
500 TABLET, FILM COATED ORAL DAILY
Qty: 7 TABLET | Refills: 0 | Status: SHIPPED | OUTPATIENT
Start: 2023-01-20 | End: 2023-01-27

## 2023-01-20 RX ORDER — AMOXICILLIN 875 MG/1
875 TABLET, COATED ORAL 2 TIMES DAILY
Qty: 20 TABLET | Refills: 0 | Status: SHIPPED | OUTPATIENT
Start: 2023-01-20 | End: 2023-01-30

## 2023-02-22 ENCOUNTER — OFFICE VISIT (OUTPATIENT)
Dept: INTERNAL MEDICINE | Age: 78
End: 2023-02-22
Payer: MEDICARE

## 2023-02-22 ENCOUNTER — HOSPITAL ENCOUNTER (OUTPATIENT)
Dept: GENERAL RADIOLOGY | Facility: HOSPITAL | Age: 78
Discharge: HOME | End: 2023-02-22
Admitting: INTERNAL MEDICINE
Payer: MEDICARE

## 2023-02-22 VITALS
HEART RATE: 82 BPM | SYSTOLIC BLOOD PRESSURE: 142 MMHG | HEIGHT: 64 IN | OXYGEN SATURATION: 98 % | DIASTOLIC BLOOD PRESSURE: 80 MMHG | TEMPERATURE: 97.9 F | WEIGHT: 204 LBS | BODY MASS INDEX: 34.83 KG/M2

## 2023-02-22 DIAGNOSIS — E11.65 TYPE 2 DIABETES MELLITUS WITH HYPERGLYCEMIA, WITHOUT LONG-TERM CURRENT USE OF INSULIN: Primary | Chronic | ICD-10-CM

## 2023-02-22 DIAGNOSIS — J18.9 COMMUNITY ACQUIRED PNEUMONIA OF RIGHT LOWER LOBE OF LUNG: ICD-10-CM

## 2023-02-22 DIAGNOSIS — E78.2 MIXED HYPERLIPIDEMIA: Chronic | ICD-10-CM

## 2023-02-22 DIAGNOSIS — E66.9 OBESITY (BMI 30-39.9): Chronic | ICD-10-CM

## 2023-02-22 DIAGNOSIS — I10 PRIMARY HYPERTENSION: Chronic | ICD-10-CM

## 2023-02-22 PROBLEM — E11.9 DM (DIABETES MELLITUS), TYPE 2: Chronic | Status: ACTIVE | Noted: 2020-03-10

## 2023-02-22 PROCEDURE — 91312 COVID-19 (PFIZER) BIVALENT BOOSTER 12+YRS: CPT | Performed by: INTERNAL MEDICINE

## 2023-02-22 PROCEDURE — 0124A COVID-19 (PFIZER) BIVALENT BOOSTER 12+YRS: CPT | Performed by: INTERNAL MEDICINE

## 2023-02-22 PROCEDURE — 99214 OFFICE O/P EST MOD 30 MIN: CPT | Performed by: INTERNAL MEDICINE

## 2023-02-22 PROCEDURE — 71046 X-RAY EXAM CHEST 2 VIEWS: CPT

## 2023-02-22 RX ORDER — METFORMIN HYDROCHLORIDE 500 MG/1
TABLET, EXTENDED RELEASE ORAL
Qty: 30 TABLET | Refills: 3 | Status: SHIPPED | OUTPATIENT
Start: 2023-02-22

## 2023-02-22 RX ORDER — MULTIPLE VITAMINS W/ MINERALS TAB 9MG-400MCG
1 TAB ORAL DAILY
COMMUNITY

## 2023-02-22 NOTE — ASSESSMENT & PLAN NOTE
Blood pressure is marginally elevated here.  Advised him to monitor and record blood pressure readings at home.  He was advised to call if his blood pressure is consistently greater than 130/80.    BP Readings from Last 3 Encounters:   02/22/23 142/80   01/19/23 118/72   09/20/22 110/82

## 2023-02-22 NOTE — PROGRESS NOTES
I N T E R N A L  M E D I C I N E    J U N O H  K I M,  M D      ENCOUNTER DATE:  02/22/2023    Nicolás DONALDSON Wayne / 77 y.o. / male    CHIEF COMPLAINT / REASON FOR OFFICE VISIT     Hypertension (6 month follow up)      ASSESSMENT & PLAN     Problem List Items Addressed This Visit        High    DM (diabetes mellitus), type 2 (HCC) - Primary (Chronic)    Current Assessment & Plan     This is a new diagnosis.  This is based on fasting glucose greater than 125 under multiple occasions.  His current A1c is 6.0 which is higher than before.    Recommended starting metformin  mg daily with dinner.  Maintain low carbohydrate diet.  Make dinner the lightest meal of the day if possible.  I advised him to avoid alcohol as much as possible.  Work on weight loss and increasing physical activity.    Lab Results   Component Value Date    GLUCOSE 154 (H) 02/15/2023    GLUCOSE 114 (H) 07/26/2022    GLUCOSE 141 (H) 03/02/2022     Lab Results   Component Value Date    HGBA1C 6.0 (H) 02/15/2023    HGBA1C 5.9 (H) 07/26/2022    HGBA1C 5.60 09/10/2020             Relevant Medications    metFORMIN ER (GLUCOPHAGE-XR) 500 MG 24 hr tablet       Medium    Hypertension (Chronic)    Overview     Continue metoprolol XL 50 mg twice daily, doxazosin 2 mg nightly, benazepril 4 mg daily and amlodipine 10 mg daily         Current Assessment & Plan     Blood pressure is marginally elevated here.  Advised him to monitor and record blood pressure readings at home.  He was advised to call if his blood pressure is consistently greater than 130/80.    BP Readings from Last 3 Encounters:   02/22/23 142/80   01/19/23 118/72   09/20/22 110/82             Relevant Medications    amLODIPine (NORVASC) 10 MG tablet    metoprolol succinate XL (TOPROL-XL) 50 MG 24 hr tablet    benazepril (LOTENSIN) 40 MG tablet    doxazosin (CARDURA) 2 MG tablet    Mixed hyperlipidemia (Chronic)    Overview     Continue rosuvastatin 10 mg qd.          Relevant Medications     "rosuvastatin (CRESTOR) 10 MG tablet       Low    Obesity (BMI 30-39.9) (Chronic)    Current Assessment & Plan     He has new diagnosis of type 2 diabetes and will start metformin  mg daily.  I advised him to minimize alcohol and follow a low carbohydrate diet as well as increase physical activity.  Weight loss was strongly advised.  Later on we could consider GLP-1 agonist therapy or SGLT2 if needed.    Wt Readings from Last 3 Encounters:   02/22/23 92.5 kg (204 lb)   01/19/23 92.2 kg (203 lb 3.2 oz)   09/20/22 93.9 kg (207 lb)     Body mass index is 35 kg/m².                 Unprioritized    Community acquired pneumonia    Current Assessment & Plan     Patient is clinically better.  Check follow-up chest x-ray         Relevant Medications    fluticasone (FLONASE) 50 MCG/ACT nasal spray    Other Relevant Orders    XR Chest 2 View (Completed)     Orders Placed This Encounter   Procedures   • XR Chest 2 View   • COVID-19 Bivalent Booster (Pf[employer]) 12+yrs     New Medications Ordered This Visit   Medications   • metFORMIN ER (GLUCOPHAGE-XR) 500 MG 24 hr tablet     Sig: Take 1 tablet with dinner.     Dispense:  30 tablet     Refill:  3       SUMMARY/DISCUSSION  •       Next Appointment with me: Visit date not found    Return in about 3 months (around 5/22/2023) for Diabetes, Hypertension.      VITAL SIGNS     Vitals:    02/22/23 1414   BP: 142/80   Pulse: 82   Temp: 97.9 °F (36.6 °C)   SpO2: 98%   Weight: 92.5 kg (204 lb)   Height: 162.6 cm (64.02\")       BP Readings from Last 3 Encounters:   02/22/23 142/80   01/19/23 118/72   09/20/22 110/82     Wt Readings from Last 3 Encounters:   02/22/23 92.5 kg (204 lb)   01/19/23 92.2 kg (203 lb 3.2 oz)   09/20/22 93.9 kg (207 lb)     Body mass index is 35 kg/m².    Blood pressure readings recorded on patient flowsheet:  No flowsheet data found.       MEDICATIONS AT THE TIME OF OFFICE VISIT     Current Outpatient Medications on File Prior to Visit   Medication Sig   • " acetaminophen (TYLENOL) 650 MG 8 hr tablet Take 650 mg by mouth Every 8 (Eight) Hours As Needed.   • amLODIPine (NORVASC) 10 MG tablet TAKE ONE TABLET BY MOUTH DAILY   • benazepril (LOTENSIN) 40 MG tablet Take 1 tablet by mouth Daily.   • doxazosin (CARDURA) 2 MG tablet TAKE ONE TABLET BY MOUTH ONCE NIGHTLY   • metoprolol succinate XL (TOPROL-XL) 50 MG 24 hr tablet TAKE ONE TABLET BY MOUTH TWICE A DAY   • multivitamin with minerals (Multivitamin Adults 50+) tablet tablet Take 1 tablet by mouth Daily.   • pantoprazole (PROTONIX) 40 MG EC tablet TAKE ONE TABLET BY MOUTH DAILY   • rosuvastatin (CRESTOR) 10 MG tablet TAKE ONE TABLET BY MOUTH ONCE NIGHTLY   • Selenium 200 MCG capsule Take 200 mcg by mouth Daily.   • fluticasone (FLONASE) 50 MCG/ACT nasal spray 2 sprays into the nostril(s) as directed by provider Daily.   • [DISCONTINUED] promethazine-dextromethorphan (PROMETHAZINE-DM) 6.25-15 MG/5ML syrup Take 5 mL by mouth 4 (Four) Times a Day As Needed for Cough.     No current facility-administered medications on file prior to visit.          HISTORY OF PRESENT ILLNESS     The patient was treated for pneumonia by DOROTA English on 01/19/2023 with his chest x-ray revealing subtle infiltrate in the right lower lobe. He admits his breathing has improved with completed course of Amoxicillin and Azithromycin. He denies having a follow-up chest x-ray. His blood pressure is 142/80 mmHg today. He admits he does not check his blood pressure often at home. He is managed on metoprolol XL 50 mg twice daily, doxazosin 2 mg nightly, benazepril 4 mg daily and amlodipine 10 mg daily. The patient's most recent lab work shows his LDL decreased from 122 mg/dL to 84 mg/dL with rosuvastatin 10 mg daily. His triglycerides are elevated. He admits he drinks 2 alcoholic beverages 3 to 4 days per week. His A1c is 6.0 percent. The patient admits he is trying to lose weight. He admits he is eating more fish and vegetables and is walking  some. He has decreased his carbohydrate and starch consumption but does eat sugary foods. He admits he has 1 meal per day in the evenings. He admits he has a family history of diabetes in his brother who is now .      Patient Care Team:  Eusebio Seaman MD as PCP - General (Internal Medicine)  Teodoro Ling MD as Consulting Physician (Orthopedic Surgery)    REVIEW OF SYSTEMS     Review of Systems   No fevers or chills, increased cough or shortness of breath, chest pain    PHYSICAL EXAMINATION     Physical Exam  General: No acute distress  Psych: Normal thought and judgment   Cardiovascular Rate: normal. Rhythm: regular. Heart sounds: normal   Pulm/Chest: Effort normal, breath sounds normal.  Central obesity noted      REVIEWED DATA     Labs:     Lab Results   Component Value Date     02/15/2023    K 4.7 02/15/2023    CALCIUM 9.4 02/15/2023    AST 39 02/15/2023    ALT 38 02/15/2023    BUN 20 02/15/2023    CREATININE 1.10 02/15/2023    CREATININE 1.22 2022    CREATININE 1.25 2022    EGFRIFNONA 38 (L) 2022    EGFRIFAFRI 44 (L) 2022       Lab Results   Component Value Date    HGBA1C 6.0 (H) 02/15/2023    HGBA1C 5.9 (H) 2022    HGBA1C 5.60 09/10/2020       Lab Results   Component Value Date    LDL 84 02/15/2023     (H) 2022     (H) 2022    HDL 44 02/15/2023    HDL 34 (L) 2022    TRIG 230 (H) 02/15/2023    TRIG 254 (H) 2022       Lab Results   Component Value Date    TSH 3.780 09/10/2020    TSH 3.230 2019    TSH 4.270 (H) 2018    FREET4 1.41 09/10/2020    FREET4 1.17 2019    FREET4 1.18 2018       Lab Results   Component Value Date    WBC 8.62 2020    HGB 15.9 2020     2020       No results found for: MALBCRERATIO       Imaging:     XR Chest PA & Lateral (2023 15:44)  CONCLUSION: Linear areas of discoid atelectasis or scarring at the left  base, subtle infiltrate suggested in the  right lower lobe.      Medical Tests:           Summary of old records / correspondence / consultant report:           Request outside records:             *Examiner was wearing KN95 mask during the entire duration of the visit. Patient was masked the entire time. Minimum social distance of 6 ft maintained entire visit except if physical contact was necessary as documented.       Template created by Panda Seaman MD     Transcribed from ambient dictation for Eusebio Seaman MD by Sarah Cho.  02/22/23   15:53 EST    Patient or patient representative verbalized consent to the visit recording.  I have personally performed the services described in this document as transcribed by the above individual, and it is both accurate and complete.  Eusebio Seaman MD  2/22/2023  17:05 EST

## 2023-02-22 NOTE — ASSESSMENT & PLAN NOTE
He has new diagnosis of type 2 diabetes and will start metformin  mg daily.  I advised him to minimize alcohol and follow a low carbohydrate diet as well as increase physical activity.  Weight loss was strongly advised.  Later on we could consider GLP-1 agonist therapy or SGLT2 if needed.    Wt Readings from Last 3 Encounters:   02/22/23 92.5 kg (204 lb)   01/19/23 92.2 kg (203 lb 3.2 oz)   09/20/22 93.9 kg (207 lb)     Body mass index is 35 kg/m².

## 2023-02-22 NOTE — PROGRESS NOTES
MyChart:    Here are the result(s) of your test(s):     Chest x-ray does not reveal any worrisome findings.      Please do not hesitate to contact me if you have questions.

## 2023-02-22 NOTE — ASSESSMENT & PLAN NOTE
This is a new diagnosis.  This is based on fasting glucose greater than 125 under multiple occasions.  His current A1c is 6.0 which is higher than before.    Recommended starting metformin  mg daily with dinner.  Maintain low carbohydrate diet.  Make dinner the lightest meal of the day if possible.  I advised him to avoid alcohol as much as possible.  Work on weight loss and increasing physical activity.    Lab Results   Component Value Date    GLUCOSE 154 (H) 02/15/2023    GLUCOSE 114 (H) 07/26/2022    GLUCOSE 141 (H) 03/02/2022     Lab Results   Component Value Date    HGBA1C 6.0 (H) 02/15/2023    HGBA1C 5.9 (H) 07/26/2022    HGBA1C 5.60 09/10/2020

## 2023-03-16 DIAGNOSIS — E78.2 MIXED HYPERLIPIDEMIA: Chronic | ICD-10-CM

## 2023-03-16 DIAGNOSIS — I10 ESSENTIAL HYPERTENSION: Chronic | ICD-10-CM

## 2023-03-16 RX ORDER — ROSUVASTATIN CALCIUM 10 MG/1
TABLET, COATED ORAL
Qty: 30 TABLET | Refills: 0 | Status: SHIPPED | OUTPATIENT
Start: 2023-03-16

## 2023-03-16 RX ORDER — AMLODIPINE BESYLATE 10 MG/1
TABLET ORAL
Qty: 30 TABLET | Refills: 0 | Status: SHIPPED | OUTPATIENT
Start: 2023-03-16

## 2023-04-17 DIAGNOSIS — I10 ESSENTIAL HYPERTENSION: Chronic | ICD-10-CM

## 2023-04-17 DIAGNOSIS — E78.2 MIXED HYPERLIPIDEMIA: Chronic | ICD-10-CM

## 2023-04-17 RX ORDER — ROSUVASTATIN CALCIUM 10 MG/1
10 TABLET, COATED ORAL NIGHTLY
Qty: 90 TABLET | Refills: 3 | Status: SHIPPED | OUTPATIENT
Start: 2023-04-17

## 2023-04-17 RX ORDER — AMLODIPINE BESYLATE 10 MG/1
10 TABLET ORAL DAILY
Qty: 90 TABLET | Refills: 3 | Status: SHIPPED | OUTPATIENT
Start: 2023-04-17

## 2023-06-01 ENCOUNTER — TELEPHONE (OUTPATIENT)
Dept: INTERNAL MEDICINE | Age: 78
End: 2023-06-01

## 2023-06-01 NOTE — TELEPHONE ENCOUNTER
"     Caller: Nicolás Black \"Jose Elias\"    Relationship: Self    Best call back number: [phone]     What medication are you requesting: ANTIBIOTIC     What are your current symptoms:     HEAD CONGESTION, BODY ACHES, FEVER, FATIGUE, NOT ABLE TO SLEEP, CHEST HURTS FROM CONTINUOUS COUGHING, AND DRAINAGE    COUGHING UP CLEAR MUCUS    How long have you been experiencing symptoms:     Have you had these symptoms before:    [x] Yes  [] No    Have you been treated for these symptoms before:   [x] Yes  [] No    If a prescription is needed, what is your preferred pharmacy and phone number: [facility] [id number] - [place], [place] - [address] - [phone]  - [phone] FX     Additional notes:    PATIENT STATES THAT HE GETS THIS CRUD EVERY THREE MONTHS OR SO.      "

## 2023-06-14 DIAGNOSIS — I10 ESSENTIAL HYPERTENSION: ICD-10-CM

## 2023-06-14 RX ORDER — METOPROLOL SUCCINATE 50 MG/1
TABLET, EXTENDED RELEASE ORAL
Qty: 180 TABLET | Refills: 0 | Status: SHIPPED | OUTPATIENT
Start: 2023-06-14

## 2023-09-15 DIAGNOSIS — I10 ESSENTIAL HYPERTENSION: ICD-10-CM

## 2023-09-15 RX ORDER — METOPROLOL SUCCINATE 50 MG/1
50 TABLET, EXTENDED RELEASE ORAL 2 TIMES DAILY
Qty: 180 TABLET | Refills: 1 | Status: CANCELLED | OUTPATIENT
Start: 2023-09-15

## 2023-09-18 DIAGNOSIS — I10 ESSENTIAL HYPERTENSION: ICD-10-CM

## 2023-09-18 RX ORDER — METOPROLOL SUCCINATE 50 MG/1
50 TABLET, EXTENDED RELEASE ORAL 2 TIMES DAILY
Qty: 180 TABLET | Refills: 1 | Status: CANCELLED | OUTPATIENT
Start: 2023-09-18

## 2023-09-25 RX ORDER — PANTOPRAZOLE SODIUM 40 MG/1
40 TABLET, DELAYED RELEASE ORAL DAILY
Qty: 90 TABLET | Refills: 0 | Status: SHIPPED | OUTPATIENT
Start: 2023-09-25

## 2023-10-05 DIAGNOSIS — I10 ESSENTIAL HYPERTENSION: Chronic | ICD-10-CM

## 2023-10-05 RX ORDER — BENAZEPRIL HYDROCHLORIDE 40 MG/1
TABLET, FILM COATED ORAL
Qty: 90 TABLET | Refills: 0 | Status: SHIPPED | OUTPATIENT
Start: 2023-10-05

## 2023-11-03 ENCOUNTER — OFFICE VISIT (OUTPATIENT)
Dept: INTERNAL MEDICINE | Age: 78
End: 2023-11-03
Payer: MEDICARE

## 2023-11-03 VITALS
WEIGHT: 210 LBS | DIASTOLIC BLOOD PRESSURE: 80 MMHG | SYSTOLIC BLOOD PRESSURE: 142 MMHG | TEMPERATURE: 97.5 F | OXYGEN SATURATION: 98 % | HEIGHT: 64 IN | HEART RATE: 68 BPM | BODY MASS INDEX: 35.85 KG/M2

## 2023-11-03 DIAGNOSIS — I10 PRIMARY HYPERTENSION: Chronic | ICD-10-CM

## 2023-11-03 DIAGNOSIS — E11.9 TYPE 2 DIABETES MELLITUS WITHOUT COMPLICATION, WITHOUT LONG-TERM CURRENT USE OF INSULIN: Chronic | ICD-10-CM

## 2023-11-03 DIAGNOSIS — Z00.00 MEDICARE ANNUAL WELLNESS VISIT, SUBSEQUENT: Primary | ICD-10-CM

## 2023-11-03 DIAGNOSIS — E78.2 MIXED HYPERLIPIDEMIA: Chronic | ICD-10-CM

## 2023-11-03 DIAGNOSIS — E66.9 OBESITY (BMI 30-39.9): Chronic | ICD-10-CM

## 2023-11-03 PROBLEM — J18.9 COMMUNITY ACQUIRED PNEUMONIA: Status: RESOLVED | Noted: 2023-01-20 | Resolved: 2023-11-03

## 2023-11-03 NOTE — ASSESSMENT & PLAN NOTE
Lab Results   Component Value Date    HGBA1C 6.0 (H) 02/15/2023    HGBA1C 5.9 (H) 07/26/2022    HGBA1C 5.60 09/10/2020    CREATININE 1.10 02/15/2023    LDL 84 02/15/2023      Previously diagnosed with new onset type 2 diabetes and started metformin  mg daily.     Check labs today.     Weight loss advised.   Maintain a low sugar/starch/carbohydrate diet.   Increase physical activity.

## 2023-11-03 NOTE — LETTER
[facility]  Vaccine Consent Form    Patient Name:  Nicolás Black  Patient :  1945     Vaccine(s) Ordered    Fluzone High-Dose 65+yrs ([id number])        Screening Checklist  The following questions should be completed prior to vaccination. If you answer “yes” to any question, it does not necessarily mean you should not be vaccinated. It just means we may need to clarify or ask more questions. If a question is unclear, please ask your healthcare provider to explain it.    Yes No   Any fever or moderate to severe illness today (mild illness and/or antibiotic treatment are not contraindications)?     Do you have a history of a serious reaction to any previous vaccinations, such as anaphylaxis, encephalopathy within 7 days, Guillain-[place] syndrome within 6 weeks, seizure?     Have you received any live vaccine(s) in the past month (MMR, TAPAN)?     Do you have an anaphylactic allergy to latex (DTaP, DTaP-IPV, Hep A, Hep B, MenB, RV, Td, Tdap), baker’s yeast (Hep B, HPV), or gelatin (TAPAN, MMR)?     Do you have an anaphylactic allergy to neomycin (Rabies, TAPAN, MMR, IPV, Hep A), polymyxin B (IPV), or streptomycin (IPV)?      Any cancer, leukemia, AIDS, or other immune system disorder? (TAPAN, MMR, RV)     Do you have a parent, brother, or sister with an immune system problem (if immune competence of vaccine recipient clinically verified, can proceed)? (MMR, TAPAN)     Any recent steroid treatments for >2 weeks, chemotherapy, or radiation treatment? (TAPAN, MMR)     Have you received antibody-containing blood transfusions or IVIG in the past 11 months (recommended interval is dependent on product)? (MMR, TAPAN)     Have you taken antiviral drugs (acyclovir, famciclovir, valacyclovir) in the last 24 or 48 hours, respectively (TAPAN)?      Are you pregnant or planning to become pregnant within 1 month? (TAPAN, MMR, HPV, IPV, MenB; For hep B- refer to Engerix-B)     For infants, have you ever been told your child has had  intussusception or a medical emergency involving obstruction of the intestine (RV)? If not for an infant, can skip this question.         *Ordering Physician/APC should be consulted if “yes” is checked by the patient or guardian above.      I have received, read, and understand the Vaccine Information Statement (VIS) for each vaccine ordered above.  I have considered my health status as well as the health status of my close contacts.  I have taken the opportunity to discuss my vaccine questions with my health care provider.   I have requested that the ordered vaccine(s) be given to me.  I understand the benefits and risks of the vaccines.  I understand that I should remain in the clinic for 15 minutes after receiving the vaccine(s).  _________________________________________________________  Signature of Patient or Parent/Legal Guardian ____________________  Date

## 2023-11-03 NOTE — PROGRESS NOTES
I N T E R N A L  M E D I C I N E    J U N O H  K I M,  M D      ENCOUNTER DATE:  11/03/2023    Nicolás DONALDSON Wayne / 78 y.o. / male    MEDICARE ANNUAL WELLNESS VISIT       Chief Complaint:    Chief Complaint   Patient presents with    Medicare Wellness-subsequent     7/26/22    CHRONIC MEDICAL PROBLEMS       Patient's general assessment of his health since a year ago:     - Compared to one year ago, he feels his physical health is:   [x] About the same  [] Better  [] Little worse  [] Significantly worse  []     - Compared to one year ago, he feels his mental health is   [x] About the same  [] Better  [] Little worse  [] Significantly worse  []     HPI for other active medical problems:     Previously diagnosed with with new onset DM 2 and started metformin. Denies significant GI side effects.     Has not been checking blood pressure at home but has a machine.   Blood pressure has been running on the higher side here last two visits.     Compliant with cholesterol and blood pressure medications otherwise.     Weight gain is noted. Not that active. Could do significantly better with diet.     Since his spouse's death he has been staying socially engaged.     Defers further PSA monitoring.       HISTORY       Recent Hospitalizations:    Recent hospitalization?:     If YES, location, date, and diagnoses:     Location:   Date:   Principle Discharge Dx:   Secondary Dx:       Patient Care Team:    Patient Care Team:  Eusebio Seaman MD as PCP - General (Internal Medicine)  Teodoro Ling MD as Consulting Physician (Orthopedic Surgery)      Allergies:  Latex    Medications:  Current Outpatient Medications on File Prior to Visit   Medication Sig Dispense Refill    acetaminophen (TYLENOL) 650 MG 8 hr tablet Take 1 tablet by mouth Every 8 (Eight) Hours As Needed.      amLODIPine (NORVASC) 10 MG tablet Take 1 tablet by mouth Daily. 90 tablet 3    benazepril (LOTENSIN) 40 MG tablet TAKE ONE TABLET BY MOUTH DAILY 90 tablet 0     doxazosin (CARDURA) 2 MG tablet TAKE ONE TABLET BY MOUTH ONCE NIGHTLY 90 tablet 3    metFORMIN ER (GLUCOPHAGE-XR) 500 MG 24 hr tablet TAKE ONE TABLET BY MOUTH ONCE DAILY WITH DINNER 90 tablet 3    metoprolol succinate XL (TOPROL-XL) 50 MG 24 hr tablet TAKE ONE TABLET BY MOUTH TWICE A  tablet 0    multivitamin with minerals (Multivitamin Adults 50+) tablet tablet Take 1 tablet by mouth Daily.      pantoprazole (PROTONIX) 40 MG EC tablet Take 1 tablet by mouth Daily. 90 tablet 0    rosuvastatin (CRESTOR) 10 MG tablet Take 1 tablet by mouth Every Night. 90 tablet 3    Selenium 200 MCG capsule Take 200 mcg by mouth Daily.      [DISCONTINUED] fluticasone (FLONASE) 50 MCG/ACT nasal spray 2 sprays into the nostril(s) as directed by provider Daily. (Patient not taking: Reported on 11/3/2023) 11.1 mL 0     No current facility-administered medications on file prior to visit.        PFSH:     The following portions of the patient's history were reviewed and updated as appropriate: Allergies / Current Medications / Past Medical History / Surgical History / Social History / Family History    Problem List:  Patient Active Problem List   Diagnosis    Hypertension    Mixed hyperlipidemia    Obesity (BMI 30-39.9)    DM (diabetes mellitus), type 2 (HCC)    SARAH (obstructive sleep apnea)    Increased prostate specific antigen (PSA) velocity    Severe eczema       Past Medical History:  Past Medical History:   Diagnosis Date    Arthritis     Asthma     remote history    Broken legs     Community acquired pneumonia     Deep vein thrombosis     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 of 18    Depression     Diverticulitis     Diverticulosis     Elevated PSA     GERD (gastroesophageal reflux disease)     Hearing loss     Hyperlipidemia     Hypertension     Inguinal hernia     Peptic ulcer     Reactive depression 09/08/2020    Wife Surya passed away 2019    Rheumatoid arthritis     Skin cancer     Sleep apnea        Past Surgical History:  Past  Surgical History:   Procedure Laterality Date    COLONOSCOPY N/A 2017    Procedure: COLONOSCOPY into cecum and TI;  Surgeon: Braydon DHALIWAL MD;  Location: [facility] ENDOSCOPY;  Service:     CRANIOTOMY  1963    age of 18y ; blood clot     ENDOSCOPY N/A 2017    Procedure: ESOPHAGOGASTRODUODENOSCOPY WITH BOPSIES;  Surgeon: Hunter Clayton MD;  Location[facility] END[facility];  Service:     ENDOSCOPY N/A 2017    Procedure: ESOPHAGOGASTRODUODENOSCOPY with biopsies;  Surgeon: Hunter Clayton MD;  Location: [facility] ENDOSCOPY;  Service:     HIP ARTHROPLASTY Right 2018    INGUINAL HERNIA REPAIR Left 2020    Procedure: left INGUINAL HERNIA REPAIR LAPAROSCOPIC WITH DAVINCI ROBOT;  Surgeon: Donavon Monae MD;  Location: [facility] MAIN OR;  Service: DaVinci;  Laterality: Left;    KNEE ACL RECONSTRUCTION Right     MOLE REMOVAL  2020    PROSTATE BIOPSY      ~    TOTAL HIP ARTHROPLASTY Left 2018    VASECTOMY         Social History:  Social History     Socioeconomic History    Marital status:      Spouse name: Surya* ()    Number of children: 2   Tobacco Use    Smoking status: Former     Packs/day: 0.25     Years: 30.00     Additional pack years: 0.00     Total pack years: 7.50     Types: Cigarettes     Quit date:      Years since quittin.8    Smokeless tobacco: Never    Tobacco comments:     quit 20 years ago   Vaping Use    Vaping Use: Never used   Substance and Sexual Activity    Alcohol use: Yes     Comment: 1 day (2 drinks)    Drug use: Not Currently     Types: Marijuana    Sexual activity: Not Currently       Family History:  Family History   Problem Relation Age of Onset    Lung cancer Mother         smoker;  age 64    Anxiety disorder Mother     Depression Mother     Alcohol abuse Mother     Heart attack Father 64    Alcohol abuse Father     Hypertension Sister     Hyperlipidemia Sister     Esophageal cancer Brother     Depression Brother     Hypertension  "Brother     Hyperlipidemia Brother     Diabetes Brother     Kidney disease Brother     Skin cancer Brother     No Known Problems Brother     Urolithiasis Other         all siblings    No Known Problems Son     No Known Problems Son     Colon cancer Neg Hx     Prostate cancer Neg Hx     Dementia Neg Hx     Malig Hyperthermia Neg Hx          PATIENT ASSESSMENT     Vitals:  Vitals:    11/03/23 1035   BP: 142/80   Pulse: 68   Temp: 97.5 °F (36.4 °C)   SpO2: 98%   Weight: 95.3 kg (210 lb)   Height: 162.6 cm (64.02\")       BP Readings from Last 3 Encounters:   11/03/23 142/80   02/22/23 142/80   01/19/23 118/72     Wt Readings from Last 3 Encounters:   11/03/23 95.3 kg (210 lb)   02/22/23 92.5 kg (204 lb)   01/19/23 92.2 kg (203 lb 3.2 oz)      Body mass index is 36.02 kg/m².    Blood pressure readings recorded on patient flowsheet:       No data to display                    Review of Systems:    Review of Systems  Constitutional neg except per HPI   Resp neg  CV neg   GI negative   negative   Psych negative for depression   Neuro negative for change   Skin loss of skin pigmentation on arms ; history of eczema     Physical Exam:    Physical Exam  General: No acute distress; obese   Psych: Normal thought and judgment ; Normal affect and mood.   Cardiovascular Rate: normal. Rhythm: regular. Heart sounds: normal   Pulm/Chest: Effort normal, breath sounds normal.     Reviewed Data:    Labs:   Lab Results   Component Value Date     02/15/2023    K 4.7 02/15/2023    CALCIUM 9.4 02/15/2023    AST 39 02/15/2023    ALT 38 02/15/2023    BUN 20 02/15/2023    CREATININE 1.10 02/15/2023    CREATININE 1.22 07/26/2022    CREATININE 1.25 03/02/2022    EGFRIFNONA 38 (L) 02/08/2022    EGFRIFAFRI 44 (L) 02/08/2022       Lab Results   Component Value Date     (H) 07/24/2018     (H) 07/02/2018     (H) 02/28/2018    HGBA1C 6.0 (H) 02/15/2023    HGBA1C 5.9 (H) 07/26/2022    HGBA1C 5.60 09/10/2020       Lab Results "   Component Value Date    LDL 84 02/15/2023     (H) 07/26/2022     (H) 02/08/2022    HDL 44 02/15/2023    TRIG 230 (H) 02/15/2023    CHOLHDLRATIO 3.8 02/15/2023       Lab Results   Component Value Date    TSH 3.780 09/10/2020    FREET4 1.41 09/10/2020          Lab Results   Component Value Date    WBC 8.62 06/09/2020    HGB 15.9 06/09/2020    HGB 17.4 11/17/2019    HGB 17.7 09/12/2019     06/09/2020                   Lab Results   Component Value Date    PSA 2.7 07/26/2022    PSA 2.9 04/02/2021    PSA 5.5 (H) 11/25/2020     Lab Results   Component Value Date    CREATININE 1.10 02/15/2023    CREATININE 1.22 07/26/2022    CREATININE 1.25 03/02/2022    BUN 20 02/15/2023    EGFRIFNONA 38 (L) 02/08/2022    EGFRIFAFRI 44 (L) 02/08/2022         Imaging:          Medical Tests:          Screening for Glaucoma:  Previous screening for glaucoma?:   [x] YES  [] NO  []     Hearing Loss Screen:  Finger Rub Hearing Test (right ear):   [] PASSED  [] FAILED  [x] BORDERLINE  [] HAS HEARING AID  [] USING HEARING AID  [] NOT USING HEARING AID  []     Finger Rub Hearing Test (left ear):   [] PASSED  [] FAILED  [x] BORDERLINE  [] HAS HEARING AID  [] USING HEARING AID  [] NOT USING HEARING AID  []     Urinary Incontinence Screen:  Episodes of urinary incontinence? :  [] YES  [x] NO  [] N/A  [] WILL NEED FOLLOWUP  []       Depression Screen:      11/3/2023    10:34 AM   PHQ-2/PHQ-9 Depression Screening   Little Interest or Pleasure in Doing Things 0-->not at all   Feeling Down, Depressed or Hopeless 0-->not at all   PHQ-9: Brief Depression Severity Measure Score 0        PHQ-2:   [x] 0 -      NOT DEPRESSED  [] 1 -      NOT DEPRESSED  [] 2 -      NOT DEPRESSED  [] 3-6 -  DEPRESSED (PROCEED TO PHQ-9)  [] N/A - HAS DEPRESSION AND IS ON TREATMENT  [] N/A - HAS DEPRESSION AND IS NOT ON TREATMENT    PHQ-9:   [x] 0         NEGATIVE SCREENING FOR DEPRESSION  [] 1-4      MINIMAL DEPRESSION  [] 5-9      MILD DEPRESSIONNOT  DEPRESSED  [] 10-14  MODERATE DEPRESSION  [] 15-19  MODERATELY SEVERE DEPRESSION  [] 20-27  SEVERE DEPRESSION    FUNCTIONAL, FALL RISK, & COGNITIVE SCREENING (Components below):    DATA:        11/3/2023    10:34 AM   Functional & Cognitive Status   Do you have difficulty preparing food and eating? No   Do you have difficulty bathing yourself, getting dressed or grooming yourself? No   Do you have difficulty using the toilet? No   Do you have difficulty moving around from place to place? No   Do you have trouble with steps or getting out of a bed or a chair? No   Current Diet Well Balanced Diet   Dental Exam Up to date   Eye Exam Up to date   Exercise (times per week) 3 times per week   Current Exercises Include No Regular Exercise   Do you need help using the phone?  No   Are you deaf or do you have serious difficulty hearing?  Yes   Do you need help to go to places out of walking distance? No   Do you need help shopping? No   Do you need help preparing meals?  No   Do you need help with housework?  No   Do you need help with laundry? No   Do you need help taking your medications? No   Do you need help managing money? No   Do you ever drive or ride in a car without wearing a seat belt? No   Who do you live with? Alone   Do you have difficulty concentrating, remembering or making decisions? No         A) Assessment of Functional Ability:  (Assessment of ability to perform ADL's (showering/bathing, using toilet, dressing, feeding self, moving self around) and IADL's (use telephone, shop, prepare food, housekeep, do laundry, transport independently, take medications independently, and handle finances)    Degree of functional impairment: (based on assessment noted above)  [] NONE  [x] MILD  [] MODERATE  [] SEVERE  [] VERY SEVERE  []     B) Assessment of Fall Risk:  [] LOW  [x] MODERATE  [] HIGH  [] VERY HIGH     Need for further evaluation of gait, strength, and balance? :  [] YES  [x] NO    Timed Up and Go (TUG):    (>= 12 seconds indicates high risk for falling)    Observable abnormalities included:  [x] NORMAL GAIT   [] SLOW CAUTIOUS PACE  [] IMPAIRED BALANCE  [] SHORT STRIDES  [] MINIMAL ARM SWING  [] UNSTEADY (MILD)  [] UNSTEADY (MODERATE)  [] UNSTEADY (SEVERE)  [] SHUFFLING GAIT  [] USES ASSISTIVE DEVICE (CANE) PROPERLY  [] USES ASSISTIVE DEVICE (WALKER) PROPERLY  [] USES ASSISTIVE DEVICE (CANE) PROPERLY  [] DOES NOT USE ASSISTIVE DEVICE PROPERLY  [] IN WHEELCHAIR   [] NOT ABLE TO BE TESTED DUE TO SAFETY CONCERNS  []     C. Assessment of Cognitive Function:    Mini-Cog Test:     1) Registration (3 objects):     [x] YES  [] NO   [] N/A HAS DOCUMENTED DEMENTIA     2) Number of objects recalled:   [x] 3  [] 2  [] 1  [] 0     3) Clock Draw: Passed? :   [] YES  [] NO   [x] N/A  [] N/A HAS DOCUMENTED DEMENTIA     Further evaluation required? :   [] YES  [] NO   [x] CLOSE OBSERVATION NEEDED   [] SCHEDULE APPOINTMENT TO EVALUATE FURTHER   [] CONTINUE REGULAR FOLLOWUP AND MANAGEMENT   []     COUNSELING       A. Identification of Health Risk Factors:    Risk factors include: cardiovascular risk factors, history of tobacco use, obesity, and poor hearing      B. Age-Appropriate Screening Schedule:  (Refer to the list below for future screening recommendations based on patient's age, sex and/or medical conditions. Orders for these recommended tests are listed in the plan section. The patient has been provided with a written plan)    Health Maintenance Topics  Health Maintenance   Topic Date Due    URINE MICROALBUMIN  Never done    ZOSTER VACCINE (2 of 2) 05/13/2015    DIABETIC EYE EXAM  02/09/2022    HEMOGLOBIN A1C  08/15/2023    COVID-19 Vaccine (6 - 2023-24 season) 09/01/2023    LIPID PANEL  02/15/2024    ANNUAL WELLNESS VISIT  11/03/2024    BMI FOLLOWUP  11/03/2024    TDAP/TD VACCINES (3 - Td or Tdap) 10/01/2026    COLORECTAL CANCER SCREENING  09/27/2027    HEPATITIS C SCREENING  Completed    INFLUENZA VACCINE  Completed     Pneumococcal Vaccine 65+  Completed       Health Maintenance Topics Due or Over-Due  Health Maintenance Due   Topic Date Due    URINE MICROALBUMIN  Never done    ZOSTER VACCINE (2 of 2) 05/13/2015    DIABETIC EYE EXAM  02/09/2022    HEMOGLOBIN A1C  08/15/2023    COVID-19 Vaccine (6 - 2023-24 season) 09/01/2023         C. Advanced Care Planning:    Advance Care Planning   ACP discussion was held with the patient during this visit. Patient has an advance directive in EMR which is still valid.        D. Patient Self-Management and Personalized Health Advice:    He has been provided with personalized counseling/information (including brochures/handouts) about:     -- optimizing diet/nutrition plans, improving exercise / conditioning, weight management, recommended hearing testing, reducing risk for cardiovascular disease (heart, stroke, vascular), and management of sleep disorders      He has been recommended for the following preventative services which has been performed today, will be ordered today or ordered/performed on upcoming follow-up visit:     -- NUTRITION counseling provided, EXERCISE counseling provided, EYE exam for glaucoma screening recommended, CARDIOVASCULAR disease risk reduction counseling performed, FALL RISK assessment / plan of care completed, URINARY incontinence assessment done, screening for prostate cancer discussed (discussed benefits / risks and mutually decided against screening), vaccination for INFLUENZA administered/recommended/discussed , COVID-19 vaccination (and/or booster) administered/recommended/discussed, RSV vaccination administered/recommended/discussed  997    E. Miscellaneous Items: [id number]    -Aspirin use counseling: Does not need ASA (and currently is not on it)    -Discussed BMI with him. The BMI is above average; BMI management plan is completed (discussed plans for weight loss)    -Reviewed use of high risk medication in the elderly: YES    -Reviewed for potential of  harmful drug interactions in the elderly: YES        WRAP UP       Assessment & Plan:    1) MEDICARE ANNUAL WELLNESS VISIT    2) OTHER MEDICAL CONDITIONS ADDRESSED TODAY:            Problem List Items Addressed This Visit          High    DM (diabetes mellitus), type 2 (HCC) (Chronic)    Current Assessment & Plan     Lab Results   Component Value Date    HGBA1C 6.0 (H) 02/15/2023    HGBA1C 5.9 (H) 07/26/2022    HGBA1C 5.60 09/10/2020    CREATININE 1.10 02/15/2023    LDL 84 02/15/2023      Previously diagnosed with new onset type 2 diabetes and started metformin  mg daily.     Check labs today.     Weight loss advised.   Maintain a low sugar/starch/carbohydrate diet.   Increase physical activity.          Relevant Medications    metFORMIN ER (GLUCOPHAGE-XR) 500 MG 24 hr tablet    Other Relevant Orders    Microalbumin / Creatinine Urine Ratio - Urine, Clean Catch    Comprehensive Metabolic Panel    Hemoglobin A1c    Urinalysis With Microscopic If Indicated (No Culture) - Urine, Clean Catch       Medium    Hypertension (Chronic)    Overview     Continue   metoprolol XL 50 mg twice daily  doxazosin 2 mg nightly  benazepril 4 mg daily  amlodipine 10 mg daily         Current Assessment & Plan     BP Readings from Last 3 Encounters:   11/03/23 142/80   02/22/23 142/80   01/19/23 118/72      Blood pressure remains mildly elevated.   Send BP readings in 1 month.   Weight loss advised.   Increase physical activity.   Maintain low sodium diet of less than 3 grams daily.   Continue current medications for now.          Relevant Medications    doxazosin (CARDURA) 2 MG tablet    amLODIPine (NORVASC) 10 MG tablet    metoprolol succinate XL (TOPROL-XL) 50 MG 24 hr tablet    benazepril (LOTENSIN) 40 MG tablet    Mixed hyperlipidemia (Chronic)    Overview     Continue rosuvastatin 10 mg qd.          Relevant Medications    rosuvastatin (CRESTOR) 10 MG tablet    Other Relevant Orders    Lipid Panel With / Chol / HDL Ratio        Low    Obesity (BMI 30-39.9) (Chronic)    Current Assessment & Plan     Wt Readings from Last 3 Encounters:   11/03/23 95.3 kg (210 lb)   02/22/23 92.5 kg (204 lb)   01/19/23 92.2 kg (203 lb 3.2 oz)   Body mass index is 36.02 kg/m².     Maintain a low sugar/starch/carbohydrate diet.   Increase physical activity.           Other Visit Diagnoses       Medicare annual wellness visit, subsequent    -  Primary                      Orders Placed This Encounter   Procedures    Fluzone High-Dose 65+yrs ([id number])    Microalbumin / Creatinine Urine Ratio - Urine, Clean Catch    Comprehensive Metabolic Panel    Lipid Panel With / Chol / HDL Ratio    Hemoglobin A1c    Urinalysis With Microscopic If Indicated (No Culture) - Urine, Clean Catch       Discussion / Summary:        Medications as of TODAY:              Current Outpatient Medications   Medication Sig Dispense Refill    acetaminophen (TYLENOL) 650 MG 8 hr tablet Take 1 tablet by mouth Every 8 (Eight) Hours As Needed.      amLODIPine (NORVASC) 10 MG tablet Take 1 tablet by mouth Daily. 90 tablet 3    benazepril (LOTENSIN) 40 MG tablet TAKE ONE TABLET BY MOUTH DAILY 90 tablet 0    doxazosin (CARDURA) 2 MG tablet TAKE ONE TABLET BY MOUTH ONCE NIGHTLY 90 tablet 3    metFORMIN ER (GLUCOPHAGE-XR) 500 MG 24 hr tablet TAKE ONE TABLET BY MOUTH ONCE DAILY WITH DINNER 90 tablet 3    metoprolol succinate XL (TOPROL-XL) 50 MG 24 hr tablet TAKE ONE TABLET BY MOUTH TWICE A  tablet 0    multivitamin with minerals (Multivitamin Adults 50+) tablet tablet Take 1 tablet by mouth Daily.      pantoprazole (PROTONIX) 40 MG EC tablet Take 1 tablet by mouth Daily. 90 tablet 0    rosuvastatin (CRESTOR) 10 MG tablet Take 1 tablet by mouth Every Night. 90 tablet 3    Selenium 200 MCG capsule Take 200 mcg by mouth Daily.       No current facility-administered medications for this visit.         FOLLOW-UP:            Return in about 5 months (around 4/3/2024) for Reassess chronic medical  problems, **SCHEDULE COMB AWV/MED FU FOR NEXT YR (30 MIN)**.              Future Appointments   Date Time Provider Department Center   4/3/2024  2:30 PM Eusebio Seaman MD [facility]  [facility]           After Visit Summary (AVS) including the Personalized Prevention  Plan Services (PPPS) was either printed and given to the patient at check-out today and/or sent to [facility] for review.

## 2023-11-03 NOTE — ASSESSMENT & PLAN NOTE
BP Readings from Last 3 Encounters:   11/03/23 142/80   02/22/23 142/80   01/19/23 118/72      Blood pressure remains mildly elevated.   Send BP readings in 1 month.   Weight loss advised.   Increase physical activity.   Maintain low sodium diet of less than 3 grams daily.   Continue current medications for now.

## 2023-11-04 LAB
ALBUMIN SERPL-MCNC: 5 G/DL (ref 3.5–5.2)
ALBUMIN/CREAT UR: 66 MG/G CREAT (ref 0–29)
ALBUMIN/GLOB SERPL: 2.3 G/DL
ALP SERPL-CCNC: 64 U/L (ref 39–117)
ALT SERPL-CCNC: 83 U/L (ref 1–41)
APPEARANCE UR: CLEAR
AST SERPL-CCNC: 74 U/L (ref 1–40)
BACTERIA #/AREA URNS HPF: ABNORMAL /HPF
BILIRUB SERPL-MCNC: 0.6 MG/DL (ref 0–1.2)
BILIRUB UR QL STRIP: NEGATIVE
BUN SERPL-MCNC: 22 MG/DL (ref 8–23)
BUN/CREAT SERPL: 19.5 (ref 7–25)
CALCIUM SERPL-MCNC: 10.3 MG/DL (ref 8.6–10.5)
CASTS URNS MICRO: ABNORMAL
CHLORIDE SERPL-SCNC: 102 MMOL/L (ref 98–107)
CHOLEST SERPL-MCNC: 229 MG/DL (ref 0–200)
CHOLEST/HDLC SERPL: 5.73 {RATIO}
CO2 SERPL-SCNC: 30.4 MMOL/L (ref 22–29)
COLOR UR: ABNORMAL
CREAT SERPL-MCNC: 1.13 MG/DL (ref 0.76–1.27)
CREAT UR-MCNC: 219 MG/DL
EGFRCR SERPLBLD CKD-EPI 2021: 67.4 ML/MIN/1.73
EPI CELLS #/AREA URNS HPF: ABNORMAL /HPF
GLOBULIN SER CALC-MCNC: 2.2 GM/DL
GLUCOSE SERPL-MCNC: 158 MG/DL (ref 65–99)
GLUCOSE UR QL STRIP: NEGATIVE
HBA1C MFR BLD: 6.4 % (ref 4.8–5.6)
HDLC SERPL-MCNC: 40 MG/DL (ref 40–60)
HGB UR QL STRIP: NEGATIVE
KETONES UR QL STRIP: ABNORMAL
LDLC SERPL CALC-MCNC: 119 MG/DL (ref 0–100)
LEUKOCYTE ESTERASE UR QL STRIP: NEGATIVE
MICROALBUMIN UR-MCNC: 144 UG/ML
NITRITE UR QL STRIP: NEGATIVE
PH UR STRIP: 5.5 [PH] (ref 5–8)
POTASSIUM SERPL-SCNC: 4.1 MMOL/L (ref 3.5–5.2)
PROT SERPL-MCNC: 7.2 G/DL (ref 6–8.5)
PROT UR QL STRIP: ABNORMAL
RBC #/AREA URNS HPF: ABNORMAL /HPF
SODIUM SERPL-SCNC: 144 MMOL/L (ref 136–145)
SP GR UR STRIP: 1.03 (ref 1–1.03)
TRIGL SERPL-MCNC: 397 MG/DL (ref 0–150)
UROBILINOGEN UR STRIP-MCNC: ABNORMAL MG/DL
VLDLC SERPL CALC-MCNC: 70 MG/DL (ref 5–40)
WBC #/AREA URNS HPF: ABNORMAL /HPF

## 2023-11-06 DIAGNOSIS — E11.65 TYPE 2 DIABETES MELLITUS WITH HYPERGLYCEMIA, WITHOUT LONG-TERM CURRENT USE OF INSULIN: Primary | Chronic | ICD-10-CM

## 2023-11-06 DIAGNOSIS — E78.2 MIXED HYPERLIPIDEMIA: Chronic | ICD-10-CM

## 2023-11-06 NOTE — PROGRESS NOTES
CALL PATIENT WITH TEST RESULTS:  - Be sure to communicate DIRECTLY with the patient/surrogate EVEN IF the result(s) has been released or viewed by the patient on [employer]) .  - Also, mail the results IF [employer] is NOT active.      A1c for average glucose level is higher. Increase metformin  mg to TWO tablets daily with supper.     LDL cholesterol and triglycerides are higher. Verify compliance with medication. Maintain low saturated fat/cholesterol diet.    AST/ALT liver enzymes are elevated therefore I would not increase dose of statin at this time.   Needs to avoid all alcohol. Work on weight loss. May be due to fatty liver with alcohol as a factor.     Urine has protein due to diabetes and hypertension. Need to tighten control. Keep blood pressure < 130/80 and A1c closer to 6.0 if possible.       PLAN:     See me in 3 months for diabetes and hyperlipidemia with fasting labs 1 week prior to follow-up.

## 2023-11-08 ENCOUNTER — TELEPHONE (OUTPATIENT)
Dept: INTERNAL MEDICINE | Age: 78
End: 2023-11-08
Payer: MEDICARE

## 2023-11-08 NOTE — TELEPHONE ENCOUNTER
----- Message from Eusebio Seaman MD sent at 11/6/2023  7:42 AM EST -----  CALL PATIENT WITH TEST RESULTS:  - Be sure to communicate DIRECTLY with the patient/surrogate EVEN IF the result(s) has been released or viewed by the patient on [employer]) .  - Also, mail the results IF [employer] is NOT active.      A1c for average glucose level is higher. Increase metformin  mg to TWO tablets daily with supper.     LDL cholesterol and triglycerides are higher. Verify compliance with medication. Maintain low saturated fat/cholesterol diet.    AST/ALT liver enzymes are elevated therefore I would not increase dose of statin at this time.   Needs to avoid all alcohol. Work on weight loss. May be due to fatty liver with alcohol as a factor.     Urine has protein due to diabetes and hypertension. Need to tighten control. Keep blood pressure < 130/80 and A1c closer to 6.0 if possible.       PLAN:     See me in 3 months for diabetes and hyperlipidemia with fasting labs 1 week prior to follow-up.

## 2023-11-20 ENCOUNTER — TELEPHONE (OUTPATIENT)
Dept: INTERNAL MEDICINE | Age: 78
End: 2023-11-20

## 2023-11-20 NOTE — TELEPHONE ENCOUNTER
Pt returning call for shruthi. He said he needed an appt but the hub said he was needing to schedule labs. I went ahead and scheduled his labs for next Tuesday since I saw the orders in. If pt needed an office visit, he said to call him back [phone]

## 2023-12-26 RX ORDER — PANTOPRAZOLE SODIUM 40 MG/1
40 TABLET, DELAYED RELEASE ORAL DAILY
Qty: 90 TABLET | Refills: 1 | Status: SHIPPED | OUTPATIENT
Start: 2023-12-26

## 2024-01-10 DIAGNOSIS — I10 ESSENTIAL HYPERTENSION: Chronic | ICD-10-CM

## 2024-01-10 RX ORDER — BENAZEPRIL HYDROCHLORIDE 40 MG/1
40 TABLET ORAL DAILY
Qty: 90 TABLET | Refills: 1 | Status: SHIPPED | OUTPATIENT
Start: 2024-01-10

## 2024-02-01 DIAGNOSIS — I10 ESSENTIAL HYPERTENSION: Chronic | ICD-10-CM

## 2024-02-01 RX ORDER — DOXAZOSIN 2 MG/1
2 TABLET ORAL NIGHTLY
Qty: 90 TABLET | Refills: 1 | Status: SHIPPED | OUTPATIENT
Start: 2024-02-01

## 2024-04-26 DIAGNOSIS — E78.2 MIXED HYPERLIPIDEMIA: Chronic | ICD-10-CM

## 2024-04-26 DIAGNOSIS — I10 ESSENTIAL HYPERTENSION: Chronic | ICD-10-CM

## 2024-04-26 RX ORDER — ROSUVASTATIN CALCIUM 10 MG/1
10 TABLET, COATED ORAL NIGHTLY
Qty: 90 TABLET | Refills: 1 | Status: SHIPPED | OUTPATIENT
Start: 2024-04-26

## 2024-04-26 RX ORDER — AMLODIPINE BESYLATE 10 MG/1
10 TABLET ORAL DAILY
Qty: 90 TABLET | Refills: 1 | Status: SHIPPED | OUTPATIENT
Start: 2024-04-26

## 2024-06-24 RX ORDER — PANTOPRAZOLE SODIUM 40 MG/1
40 TABLET, DELAYED RELEASE ORAL DAILY
Qty: 90 TABLET | Refills: 1 | OUTPATIENT
Start: 2024-06-24

## 2024-06-26 ENCOUNTER — TELEPHONE (OUTPATIENT)
Dept: INTERNAL MEDICINE | Age: 79
End: 2024-06-26
Payer: MEDICARE

## 2024-06-26 NOTE — TELEPHONE ENCOUNTER
HUB OK TO RELAY AND SCHEDULE    Lvm to call and schedule yari with dr Seaman or DOROTA to discuss result

## 2024-07-01 ENCOUNTER — OFFICE VISIT (OUTPATIENT)
Dept: INTERNAL MEDICINE | Age: 79
End: 2024-07-01
Payer: MEDICARE

## 2024-07-01 VITALS
DIASTOLIC BLOOD PRESSURE: 100 MMHG | HEART RATE: 77 BPM | HEIGHT: 64 IN | SYSTOLIC BLOOD PRESSURE: 160 MMHG | OXYGEN SATURATION: 97 % | BODY MASS INDEX: 36.88 KG/M2 | WEIGHT: 216 LBS | TEMPERATURE: 97.3 F

## 2024-07-01 DIAGNOSIS — R80.9 TYPE 2 DIABETES MELLITUS WITH DIABETIC MICROALBUMINURIA, WITHOUT LONG-TERM CURRENT USE OF INSULIN: Primary | Chronic | ICD-10-CM

## 2024-07-01 DIAGNOSIS — E11.29 TYPE 2 DIABETES MELLITUS WITH DIABETIC MICROALBUMINURIA, WITHOUT LONG-TERM CURRENT USE OF INSULIN: Primary | Chronic | ICD-10-CM

## 2024-07-01 DIAGNOSIS — E78.2 MIXED HYPERCHOLESTEROLEMIA AND HYPERTRIGLYCERIDEMIA: Chronic | ICD-10-CM

## 2024-07-01 DIAGNOSIS — I10 PRIMARY HYPERTENSION: Chronic | ICD-10-CM

## 2024-07-01 RX ORDER — ROSUVASTATIN CALCIUM 20 MG/1
20 TABLET, COATED ORAL NIGHTLY
Qty: 90 TABLET | Refills: 0 | Status: SHIPPED | OUTPATIENT
Start: 2024-07-01

## 2024-07-01 RX ORDER — ICOSAPENT ETHYL 1 G/1
2 CAPSULE ORAL 2 TIMES DAILY WITH MEALS
Qty: 120 CAPSULE | Refills: 2 | Status: SHIPPED | OUTPATIENT
Start: 2024-07-01

## 2024-07-01 RX ORDER — DAPAGLIFLOZIN 5 MG/1
5 TABLET, FILM COATED ORAL
Qty: 30 TABLET | Refills: 2 | Status: SHIPPED | OUTPATIENT
Start: 2024-07-01

## 2024-07-01 NOTE — ASSESSMENT & PLAN NOTE
Lab Results   Component Value Date     (H) 06/19/2024     (H) 11/03/2023    LDL 84 02/15/2023    TRIG 500 (H) 06/19/2024    CHOLHDLRATIO 8.47 06/19/2024       Maintain low saturated fat diet.  Minimize alcohol and weight loss.  Increase rosuvastatin to 20 mg and start Vascepa 4 capsules daily.  Schedule fasting lipid panel and CMP 1 week prior to follow-up in 2 months.

## 2024-07-01 NOTE — ASSESSMENT & PLAN NOTE
BP Readings from Last 3 Encounters:   07/01/24 160/100   11/03/23 142/80   02/22/23 142/80      Blood pressure is high.   Add Farxiga 5 mg qAM (will increase to 10 mg later).   Continue all other blood pressure medications   Send blood pressure in 2 weeks.   Maintain low sodium diet of less than 3 grams daily.   Weight loss, avoid alcohol.   Uncontrolled SARAH.

## 2024-07-01 NOTE — ASSESSMENT & PLAN NOTE
Lab Results   Component Value Date    HGBA1C 6.30 (H) 06/19/2024    HGBA1C 6.40 (H) 11/03/2023    HGBA1C 6.0 (H) 02/15/2023    CREATININE 1.11 06/19/2024     (H) 06/19/2024    MALBCRERATIO 66 (H) 11/03/2023      Start Farxiga 5 mg qAM

## 2024-07-01 NOTE — PROGRESS NOTES
I N T E R N A L  M E D I C I N E    J U N O H  K I M,  M D      ENCOUNTER DATE:  07/01/2024    Nicolás DONALDSON Wayne / 78 y.o. / male    CHIEF COMPLAINT / REASON FOR OFFICE VISIT     Diabetes, Hyperlipidemia, and Hypertension      ASSESSMENT & PLAN     Problem List Items Addressed This Visit          High    Type 2 diabetes mellitus with diabetic microalbuminuria, without long-term current use of insulin - Primary (Chronic)    Current Assessment & Plan     Lab Results   Component Value Date    HGBA1C 6.30 (H) 06/19/2024    HGBA1C 6.40 (H) 11/03/2023    HGBA1C 6.0 (H) 02/15/2023    CREATININE 1.11 06/19/2024     (H) 06/19/2024    MALBCRERATIO 66 (H) 11/03/2023      Start Farxiga 5 mg qAM          Relevant Medications    metFORMIN ER (GLUCOPHAGE-XR) 500 MG 24 hr tablet    Farxiga 5 MG tablet tablet    Other Relevant Orders    Comprehensive Metabolic Panel       Medium    Hypertension (Chronic)    Overview     Continue   metoprolol XL 50 mg twice daily  doxazosin 2 mg nightly  benazepril 4 mg daily  amlodipine 10 mg daily         Current Assessment & Plan     BP Readings from Last 3 Encounters:   07/01/24 160/100   11/03/23 142/80   02/22/23 142/80      Blood pressure is high.   Add Farxiga 5 mg qAM (will increase to 10 mg later).   Continue all other blood pressure medications   Send blood pressure in 2 weeks.   Maintain low sodium diet of less than 3 grams daily.   Weight loss, avoid alcohol.   Uncontrolled SARAH.          Relevant Medications    metoprolol succinate XL (TOPROL-XL) 50 MG 24 hr tablet    benazepril (LOTENSIN) 40 MG tablet    doxazosin (CARDURA) 2 MG tablet    amLODIPine (NORVASC) 10 MG tablet    Mixed hypercholesterolemia and hypertriglyceridemia (Chronic)    Current Assessment & Plan     Lab Results   Component Value Date     (H) 06/19/2024     (H) 11/03/2023    LDL 84 02/15/2023    TRIG 500 (H) 06/19/2024    CHOLHDLRATIO 8.47 06/19/2024       Maintain low saturated fat diet.   "Minimize alcohol and weight loss.  Increase rosuvastatin to 20 mg and start Vascepa 4 capsules daily.  Schedule fasting lipid panel and CMP 1 week prior to follow-up in 2 months.         Relevant Medications    rosuvastatin (CRESTOR) 20 MG tablet    icosapent ethyl (Vascepa) 1 g capsule capsule    Other Relevant Orders    Lipid Panel With / Chol / HDL Ratio    Comprehensive Metabolic Panel     Orders Placed This Encounter   Procedures    Lipid Panel With / Chol / HDL Ratio    Comprehensive Metabolic Panel     New Medications Ordered This Visit   Medications    rosuvastatin (CRESTOR) 20 MG tablet     Sig: Take 1 tablet by mouth Every Night.     Dispense:  90 tablet     Refill:  0    icosapent ethyl (Vascepa) 1 g capsule capsule     Sig: Take 2 g by mouth 2 (Two) Times a Day With Meals.     Dispense:  120 capsule     Refill:  2    Farxiga 5 MG tablet tablet     Sig: Take 1 tablet by mouth Daily With Breakfast.     Dispense:  30 tablet     Refill:  2       SUMMARY/DISCUSSION      Next Appointment with me: Visit date not found    Return in about 2 months (around 9/1/2024) for Diabetes, Hypertension, Hyperlipidemia, FASTING LABS 1 WEEK PRIOR TO FOLLOWUP.      VITAL SIGNS     Vitals:    07/01/24 1405   BP: 160/100   Pulse: 77   Temp: 97.3 °F (36.3 °C)   SpO2: 97%   Weight: 98 kg (216 lb)   Height: 162.6 cm (64.02\")       BP Readings from Last 3 Encounters:   07/01/24 160/100   11/03/23 142/80   02/22/23 142/80     Wt Readings from Last 3 Encounters:   07/01/24 98 kg (216 lb)   11/03/23 95.3 kg (210 lb)   02/22/23 92.5 kg (204 lb)     Body mass index is 37.05 kg/m².    Blood pressure readings recorded on patient flowsheet:       No data to display                  MEDICATIONS AT THE TIME OF OFFICE VISIT     Current Outpatient Medications on File Prior to Visit   Medication Sig    acetaminophen (TYLENOL) 650 MG 8 hr tablet Take 1 tablet by mouth Every 8 (Eight) Hours As Needed.    amLODIPine (NORVASC) 10 MG tablet TAKE ONE " TABLET BY MOUTH DAILY    benazepril (LOTENSIN) 40 MG tablet Take 1 tablet by mouth Daily.    doxazosin (CARDURA) 2 MG tablet Take 1 tablet by mouth Every Night.    metFORMIN ER (GLUCOPHAGE-XR) 500 MG 24 hr tablet TAKE ONE TABLET BY MOUTH ONCE DAILY WITH DINNER    metoprolol succinate XL (TOPROL-XL) 50 MG 24 hr tablet TAKE ONE TABLET BY MOUTH TWICE A DAY    multivitamin with minerals (Multivitamin Adults 50+) tablet tablet Take 1 tablet by mouth Daily.    pantoprazole (PROTONIX) 40 MG EC tablet TAKE 1 TABLET BY MOUTH DAILY    Selenium 200 MCG capsule Take 200 mcg by mouth Daily.    [DISCONTINUED] rosuvastatin (CRESTOR) 10 MG tablet TAKE ONE TABLET BY MOUTH ONCE NIGHTLY     No current facility-administered medications on file prior to visit.          HISTORY OF PRESENT ILLNESS     Patient denies any acute changes or problems.  He has gained weight since last visit.  He does not monitor his blood sugar or blood pressure at home.  Fortunately his A1c is lower at 6.3 on metformin  mg 1 tablet daily.  His blood pressure unfortunately is significantly higher and he is on a multidrug regimen.  He does report compliance with his medications.  He has history of uncontrolled sleep apnea.  He reports to drinking several days a week and does not necessarily watch his sodium intake.  He denies unusual headaches vision change or chest pain.  His triglycerides are in excess of 500 and LDL is notably higher on rosuvastatin 10 mg.  Denies significant side effects from rosuvastatin.    Patient Care Team:  Eusebio Seaman MD as PCP - General (Internal Medicine)  Teodoro Ling MD as Consulting Physician (Orthopedic Surgery)    REVIEW OF SYSTEMS     Review of Systems   Weight gain  No chest pain   No headaches   Cannot tolerate CPAP for SARAH     PHYSICAL EXAMINATION     Physical Exam  Alert with normal thought and judgment.   Obese ; weight gain noted  Cardiovascular: Normal rate, regular rhythm.   Pulm/Chest: Effort normal,  breath sounds normal.  1+ bilateral lower extremities edema   Abdomen is protuberant       REVIEWED DATA     Labs:     Lab Results   Component Value Date     06/19/2024    K 4.4 06/19/2024    CALCIUM 9.8 06/19/2024    AST 40 06/19/2024    ALT 69 (H) 06/19/2024    BUN 20 06/19/2024    CREATININE 1.11 06/19/2024    CREATININE 1.13 11/03/2023    CREATININE 1.10 02/15/2023    EGFRRESULT 68.8 06/19/2024       Lab Results   Component Value Date    HGBA1C 6.30 (H) 06/19/2024    HGBA1C 6.40 (H) 11/03/2023    HGBA1C 6.0 (H) 02/15/2023       Lab Results   Component Value Date     (H) 06/19/2024     (H) 11/03/2023    LDL 84 02/15/2023    HDL 32 (L) 06/19/2024    HDL 40 11/03/2023    TRIG 500 (H) 06/19/2024    TRIG 397 (H) 11/03/2023       Lab Results   Component Value Date    TSH 3.780 09/10/2020    TSH 3.230 04/17/2019    TSH 4.270 (H) 02/08/2018    FREET4 1.41 09/10/2020    FREET4 1.17 04/17/2019    FREET4 1.18 02/08/2018       Lab Results   Component Value Date    WBC 8.62 06/09/2020    HGB 15.9 06/09/2020     06/09/2020       Lab Results   Component Value Date    MALBCRERATIO 66 (H) 11/03/2023             Imaging:     XR Chest 2 View (02/22/2023 15:46)   FINDINGS: PA and lateral chest radiographs were obtained. Comparison is  made to a prior examination dated 1/19/2023. The lungs are normal in  volume. The right lung is clear. There is stable linear opacification at  the base of the left lung. Moderate atheromatous calcification appears  stable within the aortic arch. The cardiomediastinal silhouette is  normal in appearance. No bony abnormality of the thorax is appreciated.        Medical Tests:               Summary of old records / correspondence / consultant report:             Request outside records:

## 2024-07-25 ENCOUNTER — PRIOR AUTHORIZATION (OUTPATIENT)
Dept: INTERNAL MEDICINE | Age: 79
End: 2024-07-25
Payer: MEDICARE

## 2024-08-02 DIAGNOSIS — I10 ESSENTIAL HYPERTENSION: Chronic | ICD-10-CM

## 2024-08-02 RX ORDER — DOXAZOSIN 2 MG/1
2 TABLET ORAL NIGHTLY
Qty: 90 TABLET | Refills: 1 | Status: SHIPPED | OUTPATIENT
Start: 2024-08-02

## 2024-08-26 DIAGNOSIS — R80.9 TYPE 2 DIABETES MELLITUS WITH DIABETIC MICROALBUMINURIA, WITHOUT LONG-TERM CURRENT USE OF INSULIN: Chronic | ICD-10-CM

## 2024-08-26 DIAGNOSIS — E11.29 TYPE 2 DIABETES MELLITUS WITH DIABETIC MICROALBUMINURIA, WITHOUT LONG-TERM CURRENT USE OF INSULIN: Chronic | ICD-10-CM

## 2024-08-26 DIAGNOSIS — E78.2 MIXED HYPERCHOLESTEROLEMIA AND HYPERTRIGLYCERIDEMIA: Chronic | ICD-10-CM

## 2024-08-27 LAB
ALBUMIN SERPL-MCNC: 4.6 G/DL (ref 3.5–5.2)
ALBUMIN/GLOB SERPL: 1.8 G/DL
ALP SERPL-CCNC: 70 U/L (ref 39–117)
ALT SERPL-CCNC: 61 U/L (ref 1–41)
AST SERPL-CCNC: 41 U/L (ref 1–40)
BILIRUB SERPL-MCNC: 0.6 MG/DL (ref 0–1.2)
BUN SERPL-MCNC: 23 MG/DL (ref 8–23)
BUN/CREAT SERPL: 17.4 (ref 7–25)
CALCIUM SERPL-MCNC: 9.8 MG/DL (ref 8.6–10.5)
CHLORIDE SERPL-SCNC: 103 MMOL/L (ref 98–107)
CHOLEST SERPL-MCNC: 176 MG/DL (ref 0–200)
CHOLEST/HDLC SERPL: 5.5 {RATIO}
CO2 SERPL-SCNC: 27.9 MMOL/L (ref 22–29)
CREAT SERPL-MCNC: 1.32 MG/DL (ref 0.76–1.27)
EGFRCR SERPLBLD CKD-EPI 2021: 55.9 ML/MIN/1.73
GLOBULIN SER CALC-MCNC: 2.5 GM/DL
GLUCOSE SERPL-MCNC: 137 MG/DL (ref 65–99)
HDLC SERPL-MCNC: 32 MG/DL (ref 40–60)
LDLC SERPL CALC-MCNC: 91 MG/DL (ref 0–100)
POTASSIUM SERPL-SCNC: 4.3 MMOL/L (ref 3.5–5.2)
PROT SERPL-MCNC: 7.1 G/DL (ref 6–8.5)
SODIUM SERPL-SCNC: 144 MMOL/L (ref 136–145)
TRIGL SERPL-MCNC: 320 MG/DL (ref 0–150)
VLDLC SERPL CALC-MCNC: 53 MG/DL (ref 5–40)

## 2024-09-03 ENCOUNTER — OFFICE VISIT (OUTPATIENT)
Dept: INTERNAL MEDICINE | Age: 79
End: 2024-09-03
Payer: MEDICARE

## 2024-09-03 VITALS
HEIGHT: 64 IN | OXYGEN SATURATION: 95 % | WEIGHT: 210.4 LBS | DIASTOLIC BLOOD PRESSURE: 68 MMHG | BODY MASS INDEX: 35.92 KG/M2 | TEMPERATURE: 97.9 F | SYSTOLIC BLOOD PRESSURE: 126 MMHG | HEART RATE: 81 BPM

## 2024-09-03 DIAGNOSIS — E66.01 CLASS 2 SEVERE OBESITY DUE TO EXCESS CALORIES WITH SERIOUS COMORBIDITY AND BODY MASS INDEX (BMI) OF 36.0 TO 36.9 IN ADULT: Chronic | ICD-10-CM

## 2024-09-03 DIAGNOSIS — I10 PRIMARY HYPERTENSION: Primary | Chronic | ICD-10-CM

## 2024-09-03 DIAGNOSIS — R80.9 TYPE 2 DIABETES MELLITUS WITH DIABETIC MICROALBUMINURIA, WITHOUT LONG-TERM CURRENT USE OF INSULIN: Chronic | ICD-10-CM

## 2024-09-03 DIAGNOSIS — E11.29 TYPE 2 DIABETES MELLITUS WITH DIABETIC MICROALBUMINURIA, WITHOUT LONG-TERM CURRENT USE OF INSULIN: Chronic | ICD-10-CM

## 2024-09-03 DIAGNOSIS — E78.2 MIXED HYPERCHOLESTEROLEMIA AND HYPERTRIGLYCERIDEMIA: Chronic | ICD-10-CM

## 2024-09-03 PROBLEM — E66.812 CLASS 2 OBESITY DUE TO EXCESS CALORIES WITH BODY MASS INDEX (BMI) OF 36.0 TO 36.9 IN ADULT: Chronic | Status: ACTIVE | Noted: 2017-04-17

## 2024-09-03 PROBLEM — E66.09 CLASS 2 OBESITY DUE TO EXCESS CALORIES WITH BODY MASS INDEX (BMI) OF 36.0 TO 36.9 IN ADULT: Chronic | Status: ACTIVE | Noted: 2017-04-17

## 2024-09-03 PROCEDURE — 1126F AMNT PAIN NOTED NONE PRSNT: CPT | Performed by: INTERNAL MEDICINE

## 2024-09-03 PROCEDURE — 99214 OFFICE O/P EST MOD 30 MIN: CPT | Performed by: INTERNAL MEDICINE

## 2024-09-03 PROCEDURE — G2211 COMPLEX E/M VISIT ADD ON: HCPCS | Performed by: INTERNAL MEDICINE

## 2024-09-03 PROCEDURE — 3078F DIAST BP <80 MM HG: CPT | Performed by: INTERNAL MEDICINE

## 2024-09-03 PROCEDURE — 3074F SYST BP LT 130 MM HG: CPT | Performed by: INTERNAL MEDICINE

## 2024-09-03 RX ORDER — DAPAGLIFLOZIN 10 MG/1
1 TABLET, FILM COATED ORAL EVERY MORNING
Qty: 90 TABLET | Refills: 1 | Status: SHIPPED | OUTPATIENT
Start: 2024-09-03

## 2024-09-03 NOTE — ASSESSMENT & PLAN NOTE
Good weight loss on Farxiga 5 mg.   Increase Farxiga to 10 mg daily.   Complains of diarrhea around 3 AM (taking Imodium helps). Advised to try taking metformin  mg with breakfast. Let me know if the problem continues (hold Imodium during this trial).    RECHECK BMP AND A1C on AWV in November with APRN / PA

## 2024-09-03 NOTE — ASSESSMENT & PLAN NOTE
Wt Readings from Last 3 Encounters:   09/03/24 95.4 kg (210 lb 6.4 oz)   07/01/24 98 kg (216 lb)   11/03/23 95.3 kg (210 lb)     Body mass index is 36.09 kg/m².     Weight loss with Farxiga 5 mg. Increase to 10 mg today.

## 2024-09-03 NOTE — ASSESSMENT & PLAN NOTE
BP Readings from Last 3 Encounters:   09/03/24 126/68   07/01/24 160/100   11/03/23 142/80      Blood pressure is significantly improved with addition of Farxiga 5 mg.   Continue all current medications.   Increasing Farxiga to 10 mg today.   Recheck BMP during AWV in November with APRN / PA

## 2024-09-03 NOTE — ASSESSMENT & PLAN NOTE
Lab Results   Component Value Date    LDL 91 08/27/2024     (H) 06/19/2024     (H) 11/03/2023    TRIG 320 (H) 08/27/2024    CHOLHDLRATIO 5.50 08/27/2024      LDL cholesterol is significantly lower and triglycerides are lower with increase in rosuvastatin 20 mg and start of Vascepa.

## 2024-09-03 NOTE — PROGRESS NOTES
I N T E R N A L  M E D I C I N E    J U N O H  K I M,  M D      ENCOUNTER DATE:  09/03/2024    Nicolás DONALDSON Wayne / 78 y.o. / male    CHIEF COMPLAINT / REASON FOR OFFICE VISIT     Hypertension, Diabetes, and Hyperlipidemia      ASSESSMENT & PLAN     Problem List Items Addressed This Visit          High    Type 2 diabetes mellitus with diabetic microalbuminuria, without long-term current use of insulin (Chronic)    Current Assessment & Plan     Good weight loss on Farxiga 5 mg.   Increase Farxiga to 10 mg daily.   Complains of diarrhea around 3 AM (taking Imodium helps). Advised to try taking metformin  mg with breakfast. Let me know if the problem continues (hold Imodium during this trial).    RECHECK BMP AND A1C on AWV in November with APRN / PA          Relevant Medications    metFORMIN ER (GLUCOPHAGE-XR) 500 MG 24 hr tablet    Farxiga 10 MG tablet       Medium    Hypertension - Primary (Chronic)    Overview     Continue   metoprolol XL 50 mg twice daily  doxazosin 2 mg nightly  benazepril 4 mg daily  amlodipine 10 mg daily         Current Assessment & Plan     BP Readings from Last 3 Encounters:   09/03/24 126/68   07/01/24 160/100   11/03/23 142/80      Blood pressure is significantly improved with addition of Farxiga 5 mg.   Continue all current medications.   Increasing Farxiga to 10 mg today.   Recheck BMP during AWV in November with APRN / PA          Relevant Medications    metoprolol succinate XL (TOPROL-XL) 50 MG 24 hr tablet    benazepril (LOTENSIN) 40 MG tablet    amLODIPine (NORVASC) 10 MG tablet    doxazosin (CARDURA) 2 MG tablet    Mixed hypercholesterolemia and hypertriglyceridemia (Chronic)    Current Assessment & Plan     Lab Results   Component Value Date    LDL 91 08/27/2024     (H) 06/19/2024     (H) 11/03/2023    TRIG 320 (H) 08/27/2024    CHOLHDLRATIO 5.50 08/27/2024      LDL cholesterol is significantly lower and triglycerides are lower with increase in rosuvastatin 20  "mg and start of Vascepa.          Relevant Medications    rosuvastatin (CRESTOR) 20 MG tablet    icosapent ethyl (Vascepa) 1 g capsule capsule       Low    Class 2 obesity due to excess calories with body mass index (BMI) of 36.0 to 36.9 in adult (Chronic)    Current Assessment & Plan     Wt Readings from Last 3 Encounters:   09/03/24 95.4 kg (210 lb 6.4 oz)   07/01/24 98 kg (216 lb)   11/03/23 95.3 kg (210 lb)     Body mass index is 36.09 kg/m².     Weight loss with Farxiga 5 mg. Increase to 10 mg today.           No orders of the defined types were placed in this encounter.    New Medications Ordered This Visit   Medications    Farxiga 10 MG tablet     Sig: Take 10 mg by mouth Every Morning.     Dispense:  90 tablet     Refill:  1       SUMMARY/DISCUSSION        Next Appointment with me: Visit date not found    Return in about 5 months (around 2/3/2025) for Reassess chronic medical problems, Schedule AWV FOR NOVEMBER, (WITH APRN/PA).        VITAL SIGNS     Vitals:    09/03/24 1432   BP: 126/68   BP Location: Left arm   Patient Position: Sitting   Cuff Size: Adult   Pulse: 81   Temp: 97.9 °F (36.6 °C)   TempSrc: Temporal   SpO2: 95%   Weight: 95.4 kg (210 lb 6.4 oz)   Height: 162.6 cm (64.02\")       BP Readings from Last 3 Encounters:   09/03/24 126/68   07/01/24 160/100   11/03/23 142/80     Wt Readings from Last 3 Encounters:   09/03/24 95.4 kg (210 lb 6.4 oz)   07/01/24 98 kg (216 lb)   11/03/23 95.3 kg (210 lb)     Body mass index is 36.09 kg/m².    Blood pressure readings recorded on patient flowsheet:       No data to display                MEDICATIONS AT THE TIME OF OFFICE VISIT     Current Outpatient Medications on File Prior to Visit   Medication Sig    acetaminophen (TYLENOL) 650 MG 8 hr tablet Take 1 tablet by mouth Every 8 (Eight) Hours As Needed.    amLODIPine (NORVASC) 10 MG tablet TAKE ONE TABLET BY MOUTH DAILY    benazepril (LOTENSIN) 40 MG tablet Take 1 tablet by mouth Daily.    doxazosin (CARDURA) " 2 MG tablet TAKE ONE TABLET BY MOUTH ONCE NIGHTLY    icosapent ethyl (Vascepa) 1 g capsule capsule Take 2 g by mouth 2 (Two) Times a Day With Meals.    metFORMIN ER (GLUCOPHAGE-XR) 500 MG 24 hr tablet TAKE ONE TABLET BY MOUTH ONCE DAILY WITH DINNER    metoprolol succinate XL (TOPROL-XL) 50 MG 24 hr tablet TAKE ONE TABLET BY MOUTH TWICE A DAY    multivitamin with minerals (Multivitamin Adults 50+) tablet tablet Take 1 tablet by mouth Daily.    pantoprazole (PROTONIX) 40 MG EC tablet TAKE 1 TABLET BY MOUTH DAILY    rosuvastatin (CRESTOR) 20 MG tablet Take 1 tablet by mouth Every Night.    Selenium 200 MCG capsule Take 200 mcg by mouth Daily.    [DISCONTINUED] Farxiga 5 MG tablet tablet Take 1 tablet by mouth Daily With Breakfast.     No current facility-administered medications on file prior to visit.         HISTORY OF PRESENT ILLNESS     Previously started Farxiga 5 mg daily for type 2 diabetes.  Weight loss of close to 10 pounds noted with significant improvement in blood pressure.  Previously increased rosuvastatin to 20 mg daily and added Vascepa 4 g daily and LDL along with triglycerides are significantly improved.  ALT liver enzyme is slightly lower. Complains of diarrhea around 3 AM. Attributes this to his nighttime medication. Taking Imodium helps.     Lab Results   Component Value Date    HGBA1C 6.30 (H) 06/19/2024    HGBA1C 6.40 (H) 11/03/2023    HGBA1C 6.0 (H) 02/15/2023    CREATININE 1.32 (H) 08/27/2024    LDL 91 08/27/2024    MALBCRERATIO 66 (H) 11/03/2023     Blood sugar readings recorded on patient's flowsheet:       No data to display               95.4 kg (210 lb 6.4 oz)    Patient Care Team:  Eusebio Seaman MD as PCP - General (Internal Medicine)  Teodoro Ling MD as Consulting Physician (Orthopedic Surgery)    REVIEW OF SYSTEMS     Constitutional neg except per HPI   Resp neg  CV neg   GI diarrhea at 3 AM    negative       PHYSICAL EXAMINATION     Physical Exam  Alert with normal thought  and judgment.   Weight loss noted   Trace BLE edema       REVIEWED DATA     Labs:       Lab Results   Component Value Date     08/27/2024    K 4.3 08/27/2024    CALCIUM 9.8 08/27/2024    AST 41 (H) 08/27/2024    ALT 61 (H) 08/27/2024    BUN 23 08/27/2024    CREATININE 1.32 (H) 08/27/2024    CREATININE 1.11 06/19/2024    CREATININE 1.13 11/03/2023    EGFRRESULT 55.9 (L) 08/27/2024       Lab Results   Component Value Date    HGBA1C 6.30 (H) 06/19/2024    HGBA1C 6.40 (H) 11/03/2023    HGBA1C 6.0 (H) 02/15/2023       Lab Results   Component Value Date    LDL 91 08/27/2024     (H) 06/19/2024     (H) 11/03/2023    HDL 32 (L) 08/27/2024    HDL 32 (L) 06/19/2024    TRIG 320 (H) 08/27/2024    TRIG 500 (H) 06/19/2024       Lab Results   Component Value Date    TSH 3.780 09/10/2020    TSH 3.230 04/17/2019    TSH 4.270 (H) 02/08/2018    FREET4 1.41 09/10/2020    FREET4 1.17 04/17/2019    FREET4 1.18 02/08/2018       Lab Results   Component Value Date    WBC 8.62 06/09/2020    HGB 15.9 06/09/2020     06/09/2020       Lab Results   Component Value Date    MALBCRERATIO 66 (H) 11/03/2023           Imaging:           Medical Tests:           Summary of old records / correspondence / consultant report:           Request outside records:

## 2024-09-30 DIAGNOSIS — E78.2 MIXED HYPERCHOLESTEROLEMIA AND HYPERTRIGLYCERIDEMIA: Chronic | ICD-10-CM

## 2024-09-30 RX ORDER — ROSUVASTATIN CALCIUM 20 MG/1
20 TABLET, COATED ORAL NIGHTLY
Qty: 90 TABLET | Refills: 1 | Status: SHIPPED | OUTPATIENT
Start: 2024-09-30

## 2024-10-24 DIAGNOSIS — I10 ESSENTIAL HYPERTENSION: Chronic | ICD-10-CM

## 2024-10-29 RX ORDER — AMLODIPINE BESYLATE 10 MG/1
10 TABLET ORAL DAILY
Qty: 90 TABLET | Refills: 0 | Status: SHIPPED | OUTPATIENT
Start: 2024-10-29

## 2024-11-02 DIAGNOSIS — I10 ESSENTIAL HYPERTENSION: Chronic | ICD-10-CM

## 2024-11-04 RX ORDER — BENAZEPRIL HYDROCHLORIDE 40 MG/1
40 TABLET ORAL DAILY
Qty: 90 TABLET | Refills: 0 | Status: SHIPPED | OUTPATIENT
Start: 2024-11-04

## 2024-11-18 NOTE — PROGRESS NOTES
I N T E R N A L  M E D I C I N E    DOROTA Dumas      ENCOUNTER DATE:  11/20/2024    Nicolás DONALDSON Wayne / 79 y.o. / male      MEDICARE ANNUAL WELLNESS VISIT       Chief Complaint:    Chief Complaint   Patient presents with    Medicare Wellness-subsequent         Patient's general assessment of his health since a year ago:     - Compared to one year ago, he feels his physical health is:   STABLE/SAME    - Compared to one year ago, he feels his mental health is:  STABLE/SAME    Recent Hospitalization (within past 365 days) (NO unless indicated)  No      Patient Care Team:    Patient Care Team:  Eusebio Seaman MD as PCP - General (Internal Medicine)  Teodoro Ling MD as Consulting Physician (Orthopedic Surgery)    Allergies:  Latex    Medications:  Current Outpatient Medications on File Prior to Visit   Medication Sig Dispense Refill    acetaminophen (TYLENOL) 650 MG 8 hr tablet Take 1 tablet by mouth Every 8 (Eight) Hours As Needed.      amLODIPine (NORVASC) 10 MG tablet TAKE ONE TABLET BY MOUTH DAILY 90 tablet 0    benazepril (LOTENSIN) 40 MG tablet TAKE 1 TABLET BY MOUTH DAILY 90 tablet 0    doxazosin (CARDURA) 2 MG tablet TAKE ONE TABLET BY MOUTH ONCE NIGHTLY 90 tablet 1    Farxiga 10 MG tablet Take 10 mg by mouth Every Morning. 90 tablet 1    icosapent ethyl (Vascepa) 1 g capsule capsule Take 2 g by mouth 2 (Two) Times a Day With Meals. 120 capsule 2    metFORMIN ER (GLUCOPHAGE-XR) 500 MG 24 hr tablet TAKE ONE TABLET BY MOUTH ONCE DAILY WITH DINNER 90 tablet 3    metoprolol succinate XL (TOPROL-XL) 50 MG 24 hr tablet TAKE ONE TABLET BY MOUTH TWICE A  tablet 0    multivitamin with minerals (Multivitamin Adults 50+) tablet tablet Take 1 tablet by mouth Daily.      pantoprazole (PROTONIX) 40 MG EC tablet TAKE 1 TABLET BY MOUTH DAILY 90 tablet 1    rosuvastatin (CRESTOR) 20 MG tablet TAKE ONE TABLET BY MOUTH ONCE NIGHTLY 90 tablet 1    Selenium 200 MCG capsule Take 200 mcg by mouth Daily.       No  "current facility-administered medications on file prior to visit.          No opioid medication identified on active medication list. I have reviewed chart for other potential  high risk medication/s and harmful drug interactions in the elderly.       No opioid listed on medication list       HPI for other active medical problems:     HTN with CKD, Stage 3a:  August 2024 CMP with creatinine 1.32, GFR 55.9.  Well controlled at today's visit on Cardura 2 mg nightly, metoprolol succinate XL 50 mg daily, amlodipine 10 mg daily, benazepril 40 mg daily.  Does not monitor BP at home.      Type 2 Diabetes: June 2024 A1C 6.3.  Metformin 500 mg daily with breakfast, Farxiga 10 mg daily.  Does have SE of diarrhea with metformin, for which he premedicates with Immodium.  Not monitoring fasting glucoses.  Denies any episodes of hypoglycemia.  Up to date with diabetic eye exam (he will provide us with records) - has known bilateral cataracts.   Weight is down 12 pounds since July 2024 and reports improved dietary habits.      HLD: Vascepa 2 mg BID, rosuvastatin 20 mg nightly.  August 2024 lipid panel with elevated triglycerides 320; normal LDL 91.  August 2024 CMP with mildly elevated liver enzymes AST and ALT, has known diffuse fatty infiltration of liver.        GERD: Symptoms well controlled on pantoprazole 40 mg daily.    July 2022 PSA 2.7.  Declines further screening.      Couple months ago noticed a new \"knot\" on top of his head; he is concerned about possibility of skin cancer.  Formerly saw general surgery, Dr. Donavon Khalil for prior \"skin cancer\" diagnosis of right chest/ knee about 2-3 years ago, pet patient.  He denies having a prior dermatologist.      HISTORY     PFSH:     The following portions of the patient's history were reviewed and updated as appropriate: Allergies / Current Medications / Past Medical History / Surgical History / Social History / Family History    Problem List:  Patient Active Problem List "   Diagnosis    Hypertension    Mixed hypercholesterolemia and hypertriglyceridemia    Class 2 obesity due to excess calories with body mass index (BMI) of 36.0 to 36.9 in adult    Type 2 diabetes mellitus with diabetic microalbuminuria, without long-term current use of insulin    SARAH (obstructive sleep apnea)    Increased prostate specific antigen (PSA) velocity    Severe eczema       Past Medical History:  Past Medical History:   Diagnosis Date    Arthritis     Asthma     remote history    Broken legs     Community acquired pneumonia     Deep vein thrombosis     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 of 18    Depression     Diverticulitis     Diverticulosis     Elevated PSA     GERD (gastroesophageal reflux disease)     Hearing loss     Hyperlipidemia     Hypertension     Inguinal hernia     Peptic ulcer     Reactive depression 09/08/2020    Wife Surya passed away 2019    Rheumatoid arthritis     Skin cancer     Sleep apnea        Past Surgical History:  Past Surgical History:   Procedure Laterality Date    COLONOSCOPY N/A 9/27/2017    Procedure: COLONOSCOPY into cecum and TI;  Surgeon: Braydon DHALIWAL MD;  Location: [facility] ENDOSCOPY;  Service:     CRANIOTOMY  1963    age of 18y ; blood clot     ENDOSCOPY N/A 9/26/2017    Procedure: ESOPHAGOGASTRODUODENOSCOPY WITH BOPSIES;  Surgeon: Hunter Clayton MD;  Location: [facility] ENDOSCOPY;  Service:     ENDOSCOPY N/A 12/21/2017    Procedure: ESOPHAGOGASTRODUODENOSCOPY with biopsies;  Surgeon: Hunter Clayton MD;  Location: [facility] ENDOSCOPY;  Service:     HIP ARTHROPLASTY Right 02/27/2018    INGUINAL HERNIA REPAIR Left 6/16/2020    Procedure: left INGUINAL HERNIA REPAIR LAPAROSCOPIC WITH DAVINCI ROBOT;  Surgeon: Donavon Monae MD;  Location: [facility] MAIN OR;  Service: DaVinci;  Laterality: Left;    KNEE ACL RECONSTRUCTION Right     MOLE REMOVAL  06/2020    PROSTATE BIOPSY      ~2005    TOTAL HIP ARTHROPLASTY Left 07/23/2018    VASECTOMY         Social History:  Social History  "    Socioeconomic History    Marital status:      Spouse name: Surya* ()    Number of children: 2   Tobacco Use    Smoking status: Former     Current packs/day: 0.00     Average packs/day: 0.3 packs/day for 30.0 years (7.5 ttl pk-yrs)     Types: Cigarettes     Start date:      Quit date:      Years since quittin.9    Smokeless tobacco: Never    Tobacco comments:     quit 20 years ago   Vaping Use    Vaping status: Never Used   Substance and Sexual Activity    Alcohol use: Yes     Comment: 1 day (2 drinks)    Drug use: Not Currently     Types: Marijuana    Sexual activity: Not Currently       Family History:  Family History   Problem Relation Age of Onset    Lung cancer Mother         smoker;  age 64    Anxiety disorder Mother     Depression Mother     Alcohol abuse Mother     Heart attack Father 64    Alcohol abuse Father     Hypertension Sister     Hyperlipidemia Sister     Esophageal cancer Brother     Depression Brother     Hypertension Brother     Hyperlipidemia Brother     Diabetes Brother     Kidney disease Brother     Skin cancer Brother     No Known Problems Brother     Urolithiasis Other         all siblings    No Known Problems Son     No Known Problems Son     Colon cancer Neg Hx     Prostate cancer Neg Hx     Dementia Neg Hx     Malig Hyperthermia Neg Hx          PATIENT ASSESSMENT     Vitals:  Vitals:    24 1425   BP: 130/82   Pulse: 80   Resp: 16   Temp: 97.8 °F (36.6 °C)   SpO2: 98%   Weight: 92.5 kg (204 lb)   Height: 162.6 cm (64.02\")       BP Readings from Last 3 Encounters:   24 130/82   24 126/68   24 160/100     Wt Readings from Last 3 Encounters:   24 92.5 kg (204 lb)   24 95.4 kg (210 lb 6.4 oz)   24 98 kg (216 lb)      Body mass index is 35 kg/m².    [unfilled]        Review of Systems:    Review of Systems   Constitutional:  Negative for chills, fever and unexpected weight change.   Respiratory:  Negative for cough, chest " tightness and shortness of breath.    Cardiovascular:  Negative for chest pain, palpitations and leg swelling.   Skin:         +Nevi on head   Neurological:  Negative for dizziness, weakness, light-headedness and headaches.   Psychiatric/Behavioral:  The patient is not nervous/anxious.          Physical Exam:    Physical Exam  Vitals reviewed.   Constitutional:       General: He is not in acute distress.     Appearance: Normal appearance. He is not ill-appearing, toxic-appearing or diaphoretic.   HENT:      Head: Normocephalic and atraumatic.      Right Ear: Tympanic membrane, ear canal and external ear normal. There is no impacted cerumen.      Left Ear: Tympanic membrane, ear canal and external ear normal. There is no impacted cerumen.      Nose: Nose normal. No congestion or rhinorrhea.      Mouth/Throat:      Mouth: Mucous membranes are moist.      Pharynx: Oropharynx is clear. No oropharyngeal exudate or posterior oropharyngeal erythema.   Eyes:      Extraocular Movements: Extraocular movements intact.      Conjunctiva/sclera: Conjunctivae normal.      Pupils: Pupils are equal, round, and reactive to light.   Cardiovascular:      Rate and Rhythm: Normal rate and regular rhythm.      Heart sounds: Normal heart sounds.   Pulmonary:      Effort: Pulmonary effort is normal. No respiratory distress.      Breath sounds: Normal breath sounds.   Abdominal:      General: Bowel sounds are normal.      Palpations: Abdomen is soft.      Tenderness: There is no abdominal tenderness.   Musculoskeletal:         General: Normal range of motion.      Cervical back: Normal range of motion and neck supple.      Right lower leg: No edema.      Left lower leg: No edema.   Feet:      Right foot:      Protective Sensation: 10 sites tested.  10 sites sensed.      Skin integrity: Dry skin present.      Left foot:      Protective Sensation: 10 sites tested.  10 sites sensed.      Skin integrity: Dry skin present.      Comments: Shiny,  pink papule on left parietal scalp, suspicious for squamous cell carcinoma  Lymphadenopathy:      Cervical: No cervical adenopathy.   Skin:     General: Skin is warm and dry.   Neurological:      General: No focal deficit present.      Mental Status: He is alert and oriented to person, place, and time. Mental status is at baseline.   Psychiatric:         Mood and Affect: Mood normal.         Behavior: Behavior normal.         Thought Content: Thought content normal.         Judgment: Judgment normal.           Reviewed Data:    Labs:   Lab Results   Component Value Date     08/27/2024    K 4.3 08/27/2024    CALCIUM 9.8 08/27/2024    AST 41 (H) 08/27/2024    ALT 61 (H) 08/27/2024    BUN 23 08/27/2024    CREATININE 1.32 (H) 08/27/2024    CREATININE 1.11 06/19/2024    CREATININE 1.13 11/03/2023    EGFRIFNONA 38 (L) 02/08/2022    EGFRIFAFRI 44 (L) 02/08/2022       Lab Results   Component Value Date     (H) 07/24/2018     (H) 07/02/2018     (H) 02/28/2018    HGBA1C 6.30 (H) 06/19/2024    HGBA1C 6.40 (H) 11/03/2023    HGBA1C 6.0 (H) 02/15/2023    MICROALBUR 144.0 11/03/2023       Lab Results   Component Value Date    LDL 91 08/27/2024     (H) 06/19/2024     (H) 11/03/2023    HDL 32 (L) 08/27/2024    TRIG 320 (H) 08/27/2024    CHOLHDLRATIO 5.50 08/27/2024       Lab Results   Component Value Date    TSH 3.780 09/10/2020    FREET4 1.41 09/10/2020          Lab Results   Component Value Date    WBC 8.62 06/09/2020    HGB 15.9 06/09/2020    HGB 17.4 11/17/2019    HGB 17.7 09/12/2019     06/09/2020                   Lab Results   Component Value Date    PSA 2.7 07/26/2022    PSA 2.9 04/02/2021    PSA 5.5 (H) 11/25/2020       Imaging:                Medical Tests:              Summary of old records / correspondence / consultant report:             Request outside records:           SCREENING ASSESSMENT      Screening for Glaucoma:  Previous screening for glaucoma?: Yes    Hearing  Loss Screen:  Finger Rub Hearing Test (right ear): Borderline  Finger Rub Hearing Test (left ear): Failed    Urinary Incontinence Screen:  Episodes of urinary incontinence? : No    Depression Screen:      11/20/2024     2:30 PM   PHQ-2/PHQ-9 Depression Screening   Little interest or pleasure in doing things Not at all   Feeling down, depressed, or hopeless Not at all        PHQ-2: 0 (Not depressed)    PHQ-9: 0 (Negative screening for depression)         FUNCTIONAL, FALL RISK, & COGNITIVE SCREENING (components below):     A) Functional and cognitive status based on patient responses:        11/20/2024     2:31 PM   Functional & Cognitive Status   Do you have difficulty preparing food and eating? No   Do you have difficulty bathing yourself, getting dressed or grooming yourself? No   Do you have difficulty using the toilet? No   Do you have difficulty moving around from place to place? No   Do you have trouble with steps or getting out of a bed or a chair? No   Current Diet Well Balanced Diet   Dental Exam Up to date   Eye Exam Up to date   Exercise (times per week) 2 times per week   Current Exercises Include Walking   Do you need help using the phone?  No   Are you deaf or do you have serious difficulty hearing?  Yes   Do you need help to go to places out of walking distance? No   Do you need help shopping? No   Do you need help preparing meals?  No   Do you need help with housework?  No   Do you need help with laundry? No   Do you need help taking your medications? No   Do you need help managing money? No   Do you ever drive or ride in a car without wearing a seat belt? No   Have you felt unusual stress, anger or loneliness in the last month? No   Who do you live with? Alone   If you need help, do you have trouble finding someone available to you? No   Have you been bothered in the last four weeks by sexual problems? No   Do you have difficulty concentrating, remembering or making decisions? No       B) Assessment  of Fall Risk:    Fall Risk Assessment was completed, and patient is at low risk for falls.    Need for further evaluation of gait, strength, and balance? : NO    Timed Up and Go (TUG): 10 seconds   (>= 12 seconds indicates high risk for falling)    Observable abnormalities included:   Normal balance and gait pattern    C. Assessment of Cognitive Function:    Mini-Cog Test:     1) Registration (3 objects): YES   2) Clock Draw: Passed? : Yes   3) Number of objects recalled: 3 (MA)     Further evaluation required? : No    **OVERALL ASSESSMENT OF FUNCTIONAL ABILITY**  (Assessment of ability to perform ADL's (showering/bathing, using toilet, dressing, feeding self, moving self around) and IADL's (use telephone, shop, prepare food, housekeep, do laundry, transport independently, take medications independently, and handle finances)    DEGREE OF FUNCTIONAL IMPAIRMENT:   NONE (based on assessment noted above)    ABILITY TO LIVE INDEPENDENTLY:    Capable of living independently       COUNSELING       A. Identification of Health Risk Factors:    Risk factors include: cardiovascular risk factors      B. Age-Appropriate Screening Schedule:  (Refer to the list below for future screening recommendations based on patient's age, sex and/or medical conditions. Orders for these recommended tests are listed in the plan section. The patient has been provided with a written plan)    Health Maintenance Topics  Health Maintenance   Topic Date Due    DIABETIC EYE EXAM  02/09/2022    BMI FOLLOWUP  11/03/2024    HEMOGLOBIN A1C  12/19/2024    ZOSTER VACCINE (2 of 2) 11/20/2024 (Originally 5/13/2015)    COVID-19 Vaccine (6 - 2024-25 season) 11/22/2024 (Originally 9/1/2024)    RSV Vaccine - Adults (1 - 1-dose 75+ series) 11/20/2025 (Originally 9/14/2022)    LIPID PANEL  08/27/2025    ANNUAL WELLNESS VISIT  11/20/2025    DIABETIC FOOT EXAM  11/20/2025    TDAP/TD VACCINES (3 - Td or Tdap) 10/01/2026    COLORECTAL CANCER SCREENING  09/27/2027     HEPATITIS C SCREENING  Completed    INFLUENZA VACCINE  Completed    Pneumococcal Vaccine 65+  Completed    URINE MICROALBUMIN  Discontinued       Health Maintenance Topics Due or Over-Due  Health Maintenance Due   Topic Date Due    DIABETIC EYE EXAM  02/09/2022    BMI FOLLOWUP  11/03/2024    HEMOGLOBIN A1C  12/19/2024         C. Advanced Care Planning:    Advance Care Planning   ACP discussion was held with the patient during this visit. Patient has an advance directive (not in EMR), copy requested.       D. Patient Self-Management and Personalized Health Advice:    He has been provided with PERSONALIZED COUNSELING/INFORMATION (AVS educational information) about:     -- optimizing diet/nutrition plans  improving exercise / conditioning  weight management      He has been recommended for the following PREVENTATIVE SERVICES which has been performed today, will be ordered today or ordered/performed on upcoming follow-up visit:     LIFESTYLE PREVENTATIVE MEASURES   EYE exam for DIABETES recommended annually     CARDIOVASCULAR SCREENING   N/A / Current    CANCER SCREENING   COLORECTAL cancer: screening for colorectal cancer recommended but DEFERRED by patient (risks of not screening explained to patient)  SKIN CANCER: has history of skin cancer and is followed by dermatologist   PROSTATE CANCER: Discussed and recommended but DEFERRED by patient    MISC SCREENING  DIABETES screening performed (current/reviewed labs/lab ordered)    VACCINATION/IMMUNIZATION   INFLUENZA vaccine administered/recommended/discussed   RSV vaccination discussed/recommended (60+)  COVID-19 vaccination discussed/recommended      E. Miscellaneous Items: [id number]    Aspirin use counseling: Does not need ASA (and currently is not on it)    Discussed BMI with him. The BMI is above average; BMI management plan is completed (discussed plans for weight loss)    Reviewed use of high risk medication in the elderly: YES    Reviewed for potential of harmful  drug interactions in the elderly: YES        WRAP UP       Assessment & Plan:    1) MEDICARE ANNUAL WELLNESS VISIT    2) OTHER MEDICAL CONDITIONS ADDRESSED TODAY:            Problem List Items Addressed This Visit       Hypertension (Chronic)    Overview     Continue   metoprolol XL 50 mg twice daily  doxazosin 2 mg nightly  benazepril 4 mg daily  amlodipine 10 mg daily         Relevant Medications    metoprolol succinate XL (TOPROL-XL) 50 MG 24 hr tablet    doxazosin (CARDURA) 2 MG tablet    amLODIPine (NORVASC) 10 MG tablet    benazepril (LOTENSIN) 40 MG tablet    Other Relevant Orders    Comprehensive Metabolic Panel    Mixed hypercholesterolemia and hypertriglyceridemia (Chronic)    Relevant Medications    icosapent ethyl (Vascepa) 1 g capsule capsule    rosuvastatin (CRESTOR) 20 MG tablet    Other Relevant Orders    Comprehensive Metabolic Panel    Type 2 diabetes mellitus with diabetic microalbuminuria, without long-term current use of insulin (Chronic)    Relevant Medications    metFORMIN ER (GLUCOPHAGE-XR) 500 MG 24 hr tablet    Farxiga 10 MG tablet    Other Relevant Orders    Comprehensive Metabolic Panel    Hemoglobin A1c     Other Visit Diagnoses       Encounter for annual wellness visit (AWV) in Medicare patient    -  Primary    Vitamin B12 deficiency        Relevant Orders    Vitamin B12    Atypical nevi        Relevant Orders    Ambulatory Referral to Dermatology (Completed)    Encounter for immunization        Relevant Orders    Fluzone High-Dose 65+yrs ([id number]) (Completed)                      Orders Placed This Encounter   Procedures    Fluzone High-Dose 65+yrs ([id number])    Comprehensive Metabolic Panel    Hemoglobin A1c    Vitamin B12    Ambulatory Referral to Dermatology       Discussion / Summary:    Encouraged to monitor BP at home to ensure it is averaging < 130/80.    Will update A1C and if remains < 6.5, will stop Metformin due to SE of diarrhea.  Encouraged low carb diet, regular  exercise.  He will provide our office with up to date diabetic eye exam records.      Discussed with patient that abnormal skin lesion on scalp is suspicious for possible squamous cell carcinoma, and recommend evaluation with dermatology.  Referral placed.  He understands importance of scheduling close follow up.    Medications as of TODAY:              Current Outpatient Medications   Medication Sig Dispense Refill    acetaminophen (TYLENOL) 650 MG 8 hr tablet Take 1 tablet by mouth Every 8 (Eight) Hours As Needed.      amLODIPine (NORVASC) 10 MG tablet TAKE ONE TABLET BY MOUTH DAILY 90 tablet 0    benazepril (LOTENSIN) 40 MG tablet TAKE 1 TABLET BY MOUTH DAILY 90 tablet 0    doxazosin (CARDURA) 2 MG tablet TAKE ONE TABLET BY MOUTH ONCE NIGHTLY 90 tablet 1    Farxiga 10 MG tablet Take 10 mg by mouth Every Morning. 90 tablet 1    icosapent ethyl (Vascepa) 1 g capsule capsule Take 2 g by mouth 2 (Two) Times a Day With Meals. 120 capsule 2    metFORMIN ER (GLUCOPHAGE-XR) 500 MG 24 hr tablet TAKE ONE TABLET BY MOUTH ONCE DAILY WITH DINNER 90 tablet 3    metoprolol succinate XL (TOPROL-XL) 50 MG 24 hr tablet TAKE ONE TABLET BY MOUTH TWICE A  tablet 0    multivitamin with minerals (Multivitamin Adults 50+) tablet tablet Take 1 tablet by mouth Daily.      pantoprazole (PROTONIX) 40 MG EC tablet TAKE 1 TABLET BY MOUTH DAILY 90 tablet 1    rosuvastatin (CRESTOR) 20 MG tablet TAKE ONE TABLET BY MOUTH ONCE NIGHTLY 90 tablet 1    Selenium 200 MCG capsule Take 200 mcg by mouth Daily.       No current facility-administered medications for this visit.         FOLLOW-UP:            Return for Next scheduled follow up.              Future Appointments   Date Time Provider Department Center   2/12/2025  2:15 PM Eusebio Seaman MD [facility]  [facility]           After Visit Summary (AVS) including the Personalized Prevention  Plan Services (PPPS) was either printed and given to the patient at check-out today and/or sent to [facility] for  review.

## 2024-11-20 ENCOUNTER — OFFICE VISIT (OUTPATIENT)
Dept: INTERNAL MEDICINE | Age: 79
End: 2024-11-20
Payer: MEDICARE

## 2024-11-20 VITALS
WEIGHT: 204 LBS | TEMPERATURE: 97.8 F | BODY MASS INDEX: 34.83 KG/M2 | DIASTOLIC BLOOD PRESSURE: 82 MMHG | SYSTOLIC BLOOD PRESSURE: 130 MMHG | HEIGHT: 64 IN | RESPIRATION RATE: 16 BRPM | HEART RATE: 80 BPM | OXYGEN SATURATION: 98 %

## 2024-11-20 DIAGNOSIS — E53.8 VITAMIN B12 DEFICIENCY: ICD-10-CM

## 2024-11-20 DIAGNOSIS — D22.9 ATYPICAL NEVI: ICD-10-CM

## 2024-11-20 DIAGNOSIS — R80.9 TYPE 2 DIABETES MELLITUS WITH DIABETIC MICROALBUMINURIA, WITHOUT LONG-TERM CURRENT USE OF INSULIN: Chronic | ICD-10-CM

## 2024-11-20 DIAGNOSIS — I10 PRIMARY HYPERTENSION: Chronic | ICD-10-CM

## 2024-11-20 DIAGNOSIS — E78.2 MIXED HYPERCHOLESTEROLEMIA AND HYPERTRIGLYCERIDEMIA: Chronic | ICD-10-CM

## 2024-11-20 DIAGNOSIS — Z00.00 ENCOUNTER FOR ANNUAL WELLNESS VISIT (AWV) IN MEDICARE PATIENT: Primary | ICD-10-CM

## 2024-11-20 DIAGNOSIS — Z23 ENCOUNTER FOR IMMUNIZATION: ICD-10-CM

## 2024-11-20 DIAGNOSIS — E11.29 TYPE 2 DIABETES MELLITUS WITH DIABETIC MICROALBUMINURIA, WITHOUT LONG-TERM CURRENT USE OF INSULIN: Chronic | ICD-10-CM

## 2024-11-20 PROCEDURE — 99214 OFFICE O/P EST MOD 30 MIN: CPT

## 2024-11-20 PROCEDURE — 1159F MED LIST DOCD IN RCRD: CPT

## 2024-11-20 PROCEDURE — 3079F DIAST BP 80-89 MM HG: CPT

## 2024-11-20 PROCEDURE — G0008 ADMIN INFLUENZA VIRUS VAC: HCPCS

## 2024-11-20 PROCEDURE — 1126F AMNT PAIN NOTED NONE PRSNT: CPT

## 2024-11-20 PROCEDURE — 1160F RVW MEDS BY RX/DR IN RCRD: CPT

## 2024-11-20 PROCEDURE — 3075F SYST BP GE 130 - 139MM HG: CPT

## 2024-11-20 PROCEDURE — 90662 IIV NO PRSV INCREASED AG IM: CPT

## 2024-11-20 PROCEDURE — G0439 PPPS, SUBSEQ VISIT: HCPCS

## 2024-11-20 PROCEDURE — 1170F FXNL STATUS ASSESSED: CPT

## 2024-11-20 NOTE — LETTER
[facility]  Vaccine Consent Form    Patient Name:  Nicolás Black  Patient :  1945     Vaccine(s) Ordered    Fluzone High-Dose 65+yrs ([id number])        Screening Checklist  The following questions should be completed prior to vaccination. If you answer “yes” to any question, it does not necessarily mean you should not be vaccinated. It just means we may need to clarify or ask more questions. If a question is unclear, please ask your healthcare provider to explain it.    Yes No   Any fever or moderate to severe illness today (mild illness and/or antibiotic treatment are not contraindications)?     Do you have a history of a serious reaction to any previous vaccinations, such as anaphylaxis, encephalopathy within 7 days, Guillain-Hebo syndrome within 6 weeks, seizure?     Have you received any live vaccine(s) (e.g MMR, TAPAN) or any other vaccines in the last month (to ensure duplicate doses aren't given)?     Do you have an anaphylactic allergy to latex (DTaP, DTaP-IPV, Hep A, Hep B, MenB, RV, Td, Tdap), baker’s yeast (Hep B, HPV), polysorbates (RSV, nirsevimab, PCV 20, Rotavirrus, Tdap, Shingrix), or gelatin (TAPAN, MMR)?     Do you have an anaphylactic allergy to neomycin (Rabies, TAPAN, MMR, IPV, Hep A), polymyxin B (IPV), or streptomycin (IPV)?      Any cancer, leukemia, AIDS, or other immune system disorder? (TAPAN, MMR, RV)     Do you have a parent, brother, or sister with an immune system problem (if immune competence of vaccine recipient clinically verified, can proceed)? (MMR, TAPAN)     Any recent steroid treatments for >2 weeks, chemotherapy, or radiation treatment? (TAPAN, MMR)     Have you received antibody-containing blood transfusions or IVIG in the past 11 months (recommended interval is dependent on product)? (MMR, TAPAN)     Have you taken antiviral drugs (acyclovir, famciclovir, valacyclovir for TAPAN) in the last 24 or 48 hours, respectively?      Are you pregnant or planning to become pregnant  "within 1 month? (TAPAN, MMR, HPV, IPV, MenB, Abrexvy; For Hep B- refer to Engerix-B; For RSV - Abrysvo is indicated for 32-36 weeks of pregnancy from September to January)     For infants, have you ever been told your child has had intussusception or a medical emergency involving obstruction of the intestine (Rotavirus)? If not for an infant, can skip this question.         *Ordering Physicians/APC should be consulted if \"yes\" is checked by the patient or guardian above.  I have received, read, and understand the Vaccine Information Statement (VIS) for each vaccine ordered.  I have considered my or my child's health status as well as the health status of my close contacts.  I have taken the opportunity to discuss my vaccine questions with my or my child's health care provider.   I have requested that the ordered vaccine(s) be given to me or my child.  I understand the benefits and risks of the vaccines.  I understand that I should remain in the clinic for 15 minutes after receiving the vaccine(s).  _________________________________________________________  Signature of Patient or Parent/Legal Guardian ____________________  Date     "

## 2024-11-21 DIAGNOSIS — N18.31 STAGE 3A CHRONIC KIDNEY DISEASE: Primary | ICD-10-CM

## 2024-11-21 LAB
ALBUMIN SERPL-MCNC: 4.7 G/DL (ref 3.5–5.2)
ALBUMIN/GLOB SERPL: 1.7 G/DL
ALP SERPL-CCNC: 75 U/L (ref 39–117)
ALT SERPL-CCNC: 58 U/L (ref 1–41)
AST SERPL-CCNC: 39 U/L (ref 1–40)
BILIRUB SERPL-MCNC: 0.6 MG/DL (ref 0–1.2)
BUN SERPL-MCNC: 19 MG/DL (ref 8–23)
BUN/CREAT SERPL: 12.8 (ref 7–25)
CALCIUM SERPL-MCNC: 10.3 MG/DL (ref 8.6–10.5)
CHLORIDE SERPL-SCNC: 102 MMOL/L (ref 98–107)
CO2 SERPL-SCNC: 29.6 MMOL/L (ref 22–29)
CREAT SERPL-MCNC: 1.48 MG/DL (ref 0.76–1.27)
EGFRCR SERPLBLD CKD-EPI 2021: 48.4 ML/MIN/1.73
GLOBULIN SER CALC-MCNC: 2.8 GM/DL
GLUCOSE SERPL-MCNC: 153 MG/DL (ref 65–99)
HBA1C MFR BLD: 6.2 % (ref 4.8–5.6)
POTASSIUM SERPL-SCNC: 4.7 MMOL/L (ref 3.5–5.2)
PROT SERPL-MCNC: 7.5 G/DL (ref 6–8.5)
SODIUM SERPL-SCNC: 143 MMOL/L (ref 136–145)
VIT B12 SERPL-MCNC: 698 PG/ML (ref 211–946)

## 2024-11-25 ENCOUNTER — TELEPHONE (OUTPATIENT)
Dept: INTERNAL MEDICINE | Age: 79
End: 2024-11-25
Payer: MEDICARE

## 2024-12-06 ENCOUNTER — OFFICE VISIT (OUTPATIENT)
Dept: INTERNAL MEDICINE | Age: 79
End: 2024-12-06
Payer: MEDICARE

## 2024-12-06 ENCOUNTER — HOSPITAL ENCOUNTER (OUTPATIENT)
Facility: HOSPITAL | Age: 79
Discharge: HOME | End: 2024-12-06
Payer: MEDICARE

## 2024-12-06 VITALS
TEMPERATURE: 96.5 F | WEIGHT: 206 LBS | OXYGEN SATURATION: 96 % | SYSTOLIC BLOOD PRESSURE: 128 MMHG | HEART RATE: 78 BPM | HEIGHT: 64 IN | BODY MASS INDEX: 35.17 KG/M2 | RESPIRATION RATE: 18 BRPM | DIASTOLIC BLOOD PRESSURE: 82 MMHG

## 2024-12-06 DIAGNOSIS — Z51.81 ENCOUNTER FOR THERAPEUTIC DRUG LEVEL MONITORING: ICD-10-CM

## 2024-12-06 DIAGNOSIS — R07.81 RIB PAIN ON RIGHT SIDE: Primary | ICD-10-CM

## 2024-12-06 PROCEDURE — 71101 X-RAY EXAM UNILAT RIBS/CHEST: CPT

## 2024-12-06 RX ORDER — KETOCONAZOLE 20 MG/ML
SHAMPOO, SUSPENSION TOPICAL
COMMUNITY
Start: 2024-11-22

## 2024-12-06 RX ORDER — TRAMADOL HYDROCHLORIDE 50 MG/1
50 TABLET ORAL EVERY 8 HOURS PRN
Qty: 21 TABLET | Refills: 0 | Status: SHIPPED | OUTPATIENT
Start: 2024-12-06

## 2024-12-06 RX ORDER — CYCLOBENZAPRINE HCL 10 MG
10 TABLET ORAL 3 TIMES DAILY PRN
Qty: 30 TABLET | Refills: 0 | Status: SHIPPED | OUTPATIENT
Start: 2024-12-06

## 2024-12-06 RX ORDER — PANTOPRAZOLE SODIUM 40 MG/1
40 TABLET, DELAYED RELEASE ORAL DAILY
Qty: 90 TABLET | Refills: 1 | Status: SHIPPED | OUTPATIENT
Start: 2024-12-06

## 2024-12-06 NOTE — PROGRESS NOTES
GRICELDA TORRES PA-C                  I  N  T  E  R  N  A  L    M  E  D  I  C  I  N  E         ENCOUNTER DATE:  12/06/2024    Nicolás DONALDSON Wayne / 79 y.o. / male    OFFICE VISIT ENCOUNTER       CHIEF COMPLAINT / REASON FOR OFFICE VISIT     rib cage (Pt felled 12/4, he thinks he broken his rib cage on the right side. Difficulty sleeping,)      ASSESSMENT & PLAN     Problem List Items Addressed This Visit    None  Visit Diagnoses       Rib pain on right side    -  Primary    Relevant Medications    traMADol (ULTRAM) 50 MG tablet    Other Relevant Orders    XR Ribs Right With PA Chest (Completed)    Compliance Drug Analysis, Ur - Urine, Clean Catch    Encounter for therapeutic drug level monitoring        Relevant Orders    Compliance Drug Analysis, Ur - Urine, Clean Catch          Orders Placed This Encounter   Procedures    XR Ribs Right With PA Chest     Order Specific Question:   Reason for Exam:     Answer:   RT Lateral Rib Injury s/p Fall on 12/02/24     Order Specific Question:   Does this patient have a diabetic monitoring/medication delivering device on?     Answer:   No     Order Specific Question:   Release to patient     Answer:   Routine Release [[id number]]    Compliance Drug Analysis, Ur - Urine, Clean Catch     Order Specific Question:   Release to patient     Answer:   Routine Release [[id number]]     New Medications Ordered This Visit   Medications    cyclobenzaprine (FLEXERIL) 10 MG tablet     Sig: Take 1 tablet by mouth 3 (Three) Times a Day As Needed for Muscle Spasms.     Dispense:  30 tablet     Refill:  0    traMADol (ULTRAM) 50 MG tablet     Sig: Take 1 tablet by mouth Every 8 (Eight) Hours As Needed for Moderate Pain.     Dispense:  21 tablet     Refill:  0       SUMMARY/DISCUSSION  Chest X-Ray ordered to screen for rib fractures.   Start Tramadol 50 mg q8 PRN, and Cyclobenzaprine 10 mg TID.   Written consent for treatment with controlled substance form detailing  "reasons for Tramadol 50 mg q8 PRN prescription including adverse effects, cautions of physical dependence and addiction, and alternatives is completed today and signed by both the provider and patient/guardian.  Printed patient education provided to the patient.  Urine drug compliance screen obtained today.  [place] All Scheduled Prescription Electronic Reporting (NOLVIA) obtained for patient's controlled prescription history on today's visit. See 'Media' tab for copy of NOLVIA.       Next Appointment:   Return in about 2 weeks (around 12/20/2024) for Recheck.      VITAL SIGNS     Vitals:    12/06/24 1354   BP: 128/82   BP Location: Left arm   Patient Position: Sitting   Cuff Size: Adult   Pulse: 78   Resp: 18   Temp: 96.5 °F (35.8 °C)   TempSrc: Temporal   SpO2: 96%   Weight: 93.4 kg (206 lb)   Height: 162.6 cm (64.02\")       BP Readings from Last 3 Encounters:   12/06/24 128/82   11/20/24 130/82   09/03/24 126/68     Wt Readings from Last 3 Encounters:   12/06/24 93.4 kg (206 lb)   11/20/24 92.5 kg (204 lb)   09/03/24 95.4 kg (210 lb 6.4 oz)     Body mass index is 35.34 kg/m².         No data to display                   MEDICATIONS AT THE TIME OF OFFICE VISIT     Current Outpatient Medications on File Prior to Visit   Medication Sig    acetaminophen (TYLENOL) 650 MG 8 hr tablet Take 1 tablet by mouth Every 8 (Eight) Hours As Needed.    amLODIPine (NORVASC) 10 MG tablet TAKE ONE TABLET BY MOUTH DAILY    benazepril (LOTENSIN) 40 MG tablet TAKE 1 TABLET BY MOUTH DAILY    doxazosin (CARDURA) 2 MG tablet TAKE ONE TABLET BY MOUTH ONCE NIGHTLY    Farxiga 10 MG tablet Take 10 mg by mouth Every Morning.    icosapent ethyl (Vascepa) 1 g capsule capsule Take 2 g by mouth 2 (Two) Times a Day With Meals.    ketoconazole (NIZORAL) 2 % shampoo     metFORMIN ER (GLUCOPHAGE-XR) 500 MG 24 hr tablet TAKE ONE TABLET BY MOUTH ONCE DAILY WITH DINNER    metoprolol succinate XL (TOPROL-XL) 50 MG 24 hr tablet TAKE ONE TABLET BY MOUTH " TWICE A DAY    multivitamin with minerals (Multivitamin Adults 50+) tablet tablet Take 1 tablet by mouth Daily.    pantoprazole (PROTONIX) 40 MG EC tablet TAKE 1 TABLET BY MOUTH DAILY    rosuvastatin (CRESTOR) 20 MG tablet TAKE ONE TABLET BY MOUTH ONCE NIGHTLY    Selenium 200 MCG capsule Take 200 mcg by mouth Daily.     No current facility-administered medications on file prior to visit.          HISTORY OF PRESENT ILLNESS     PT is a 80y/o wm with hypertension, hyperlipidemia, and diabetes type 2 who presents with right sided rib pain following fall.    RT Rib Pain: Patient reports a slip while stepping on a magazine, that took place on 12/2/2024 as he was making an abrupt turn to his left while walking inside his home. He fell onto his right ribs causing a sharp pain to his right lateral ribs. He describes the pain as a constant, dull, ache, rated 5/10. Pain is worsened with deep breaths, coughing, sneezing, or lying onto his right side. Denies hemoptysis, shortness of breath, or chest pain. He has attempted hot showers as well as Tylenol every four hours with little success. He has avoided NSAIDs due to reduced eGFR.     Reduced eGFR:  Level of 48.4 in November 2024, a decrease from 55.9 in August 2024.    Patient Care Team:  Eusebio Seaman MD as PCP - General (Internal Medicine)  Teodoro Ling MD as Consulting Physician (Orthopedic Surgery)    REVIEW OF SYSTEMS     Review of Systems   As Per HPI.  All other systems negative.     PHYSICAL EXAMINATION     Physical Exam  Vitals and nursing note reviewed.   Constitutional:       General: He is not in acute distress.     Appearance: Normal appearance. He is not ill-appearing.   Cardiovascular:      Rate and Rhythm: Normal rate and regular rhythm.      Pulses: Normal pulses.      Heart sounds: Normal heart sounds. No murmur heard.     No friction rub. No gallop.   Pulmonary:      Effort: Pulmonary effort is normal. No respiratory distress.      Breath sounds:  Normal breath sounds. No stridor. No wheezing.   Chest:      Chest wall: Tenderness (Right lateral rib pain) present.       Neurological:      Mental Status: He is alert.   Psychiatric:         Mood and Affect: Mood normal.         Behavior: Behavior normal.             REVIEWED DATA     Labs:     Lab Results   Component Value Date     11/20/2024    K 4.7 11/20/2024    CALCIUM 10.3 11/20/2024    AST 39 11/20/2024    ALT 58 (H) 11/20/2024    BUN 19 11/20/2024    CREATININE 1.48 (H) 11/20/2024    CREATININE 1.32 (H) 08/27/2024    CREATININE 1.11 06/19/2024    EGFRRESULT 48.4 (L) 11/20/2024     Lab Results   Component Value Date    HGBA1C 6.20 (H) 11/20/2024    HGBA1C 6.30 (H) 06/19/2024    HGBA1C 6.40 (H) 11/03/2023     Lab Results   Component Value Date    LDL 91 08/27/2024     (H) 06/19/2024     (H) 11/03/2023    HDL 32 (L) 08/27/2024    HDL 32 (L) 06/19/2024    TRIG 320 (H) 08/27/2024    TRIG 500 (H) 06/19/2024     Lab Results   Component Value Date    TSH 3.780 09/10/2020    TSH 3.230 04/17/2019    TSH 4.270 (H) 02/08/2018    FREET4 1.41 09/10/2020    FREET4 1.17 04/17/2019    FREET4 1.18 02/08/2018     Lab Results   Component Value Date    WBC 8.62 06/09/2020    HGB 15.9 06/09/2020     06/09/2020     Lab Results   Component Value Date    MALBCRERATIO 66 (H) 11/03/2023             Imaging:               Medical Tests:               Summary of old records / correspondence / consultant report:             Request outside records:   NOLVIA REVIEWED: DATE -  12/08/2024  //   REQ# - [id number]

## 2024-12-14 LAB — DRUGS UR: NORMAL

## 2024-12-26 ENCOUNTER — OFFICE VISIT (OUTPATIENT)
Dept: INTERNAL MEDICINE | Age: 79
End: 2024-12-26
Payer: MEDICARE

## 2024-12-26 VITALS
WEIGHT: 203.2 LBS | OXYGEN SATURATION: 96 % | HEART RATE: 72 BPM | SYSTOLIC BLOOD PRESSURE: 142 MMHG | TEMPERATURE: 98.5 F | HEIGHT: 64 IN | DIASTOLIC BLOOD PRESSURE: 76 MMHG | BODY MASS INDEX: 34.69 KG/M2

## 2024-12-26 DIAGNOSIS — S22.41XD CLOSED FRACTURE OF MULTIPLE RIBS OF RIGHT SIDE WITH ROUTINE HEALING, SUBSEQUENT ENCOUNTER: Primary | ICD-10-CM

## 2024-12-26 PROCEDURE — 99213 OFFICE O/P EST LOW 20 MIN: CPT | Performed by: PHYSICIAN ASSISTANT

## 2024-12-26 PROCEDURE — 1126F AMNT PAIN NOTED NONE PRSNT: CPT | Performed by: PHYSICIAN ASSISTANT

## 2024-12-26 PROCEDURE — 3077F SYST BP >= 140 MM HG: CPT | Performed by: PHYSICIAN ASSISTANT

## 2024-12-26 PROCEDURE — G2211 COMPLEX E/M VISIT ADD ON: HCPCS | Performed by: PHYSICIAN ASSISTANT

## 2024-12-26 PROCEDURE — 3078F DIAST BP <80 MM HG: CPT | Performed by: PHYSICIAN ASSISTANT

## 2024-12-26 NOTE — PROGRESS NOTES
"                            GRICELDA TORRES PA-C                  I  N  T  E  R  N  A  L    M  E  D  I  C  I  N  E         ENCOUNTER DATE:  12/26/2024    Nicolás Black / 79 y.o. / male    OFFICE VISIT ENCOUNTER       CHIEF COMPLAINT / REASON FOR OFFICE VISIT     Primary Care Follow-Up and Rib Injury      ASSESSMENT & PLAN     Problem List Items Addressed This Visit    None  Visit Diagnoses       Closed fracture of multiple ribs of right side with routine healing, subsequent encounter    -  Primary          No orders of the defined types were placed in this encounter.    No orders of the defined types were placed in this encounter.      SUMMARY/DISCUSSION  Closed, minimally-displaced fractures to the right anterior sixth and seventh ribs as confirmed with rib x-ray on 12/6/2024.  Previously prescribed tramadol for severe pain, but he is no longer in need of tramadol and has since discontinued it due to the large improvement in rib pain.  He is not interested in further pain control.  The only time his ribs bother him is when he attempts to sleep on his right side.        Next Appointment:   Return for Follow-up with Dr. Seaman as scheduled 2/12/25.      VITAL SIGNS     Vitals:    12/26/24 1530   BP: 142/76   Pulse: 72   Temp: 98.5 °F (36.9 °C)   SpO2: 96%   Weight: 92.2 kg (203 lb 3.2 oz)   Height: 162.6 cm (64.02\")       BP Readings from Last 3 Encounters:   12/26/24 142/76   12/06/24 128/82   11/20/24 130/82     Wt Readings from Last 3 Encounters:   12/26/24 92.2 kg (203 lb 3.2 oz)   12/06/24 93.4 kg (206 lb)   11/20/24 92.5 kg (204 lb)     Body mass index is 34.86 kg/m².         No data to display                   MEDICATIONS AT THE TIME OF OFFICE VISIT     Current Outpatient Medications on File Prior to Visit   Medication Sig    acetaminophen (TYLENOL) 650 MG 8 hr tablet Take 1 tablet by mouth Every 8 (Eight) Hours As Needed.    amLODIPine (NORVASC) 10 MG tablet TAKE ONE TABLET BY MOUTH DAILY    benazepril " (LOTENSIN) 40 MG tablet TAKE 1 TABLET BY MOUTH DAILY    cyclobenzaprine (FLEXERIL) 10 MG tablet Take 1 tablet by mouth 3 (Three) Times a Day As Needed for Muscle Spasms.    doxazosin (CARDURA) 2 MG tablet TAKE ONE TABLET BY MOUTH ONCE NIGHTLY    Farxiga 10 MG tablet Take 10 mg by mouth Every Morning.    icosapent ethyl (Vascepa) 1 g capsule capsule Take 2 g by mouth 2 (Two) Times a Day With Meals.    ketoconazole (NIZORAL) 2 % shampoo     metoprolol succinate XL (TOPROL-XL) 50 MG 24 hr tablet TAKE ONE TABLET BY MOUTH TWICE A DAY    multivitamin with minerals (Multivitamin Adults 50+) tablet tablet Take 1 tablet by mouth Daily.    pantoprazole (PROTONIX) 40 MG EC tablet TAKE 1 TABLET BY MOUTH DAILY    rosuvastatin (CRESTOR) 20 MG tablet TAKE ONE TABLET BY MOUTH ONCE NIGHTLY    Selenium 200 MCG capsule Take 200 mcg by mouth Daily.    traMADol (ULTRAM) 50 MG tablet Take 1 tablet by mouth Every 8 (Eight) Hours As Needed for Moderate Pain.    metFORMIN ER (GLUCOPHAGE-XR) 500 MG 24 hr tablet TAKE ONE TABLET BY MOUTH ONCE DAILY WITH DINNER (Patient not taking: Reported on 12/26/2024)     No current facility-administered medications on file prior to visit.          HISTORY OF PRESENT ILLNESS     PT is a 80y/o wm with hypertension, hyperlipidemia, and diabetes type 2 who presents to follow up for right-sided rib pain following fall.     Fractures of 6th & 7th Ribs on Right Side: Previously reported slipping after stepping on magazine on 12/2/2024 as he was making an abrupt turn to his left while walking inside his home. He fell onto his right ribs causing a sharp pain to his right lateral ribs. Rib x-rays obtained on 12/06/24 demonstrated minimally-displaced fractures of right 6th & 7th Ribs. He describes the pain as being much improved to where he no longer needs Tramadol dosing. Pain is currently only noticeable when lying onto his right side. Denies hemoptysis, shortness of breath, chest pain, or pain on inspiration. He  has attempted hot showers as well as Tylenol every four hours with little success. He has avoided NSAIDs due to reduced eGFR.        Patient Care Team:  Eusebio Seaman MD as PCP - General (Internal Medicine)  Teodoro Ling MD as Consulting Physician (Orthopedic Surgery)    REVIEW OF SYSTEMS     Review of Systems   As Per HPI.  All other systems negative.     PHYSICAL EXAMINATION     Physical Exam  Vitals and nursing note reviewed.   Constitutional:       General: He is not in acute distress.     Appearance: Normal appearance. He is not ill-appearing.   Cardiovascular:      Rate and Rhythm: Normal rate and regular rhythm.      Pulses: Normal pulses.      Heart sounds: Normal heart sounds. No murmur heard.     No friction rub. No gallop.   Pulmonary:      Effort: Pulmonary effort is normal. No respiratory distress.      Breath sounds: Normal breath sounds. No stridor. No wheezing.   Chest:      Chest wall: No tenderness.   Neurological:      Mental Status: He is alert.   Psychiatric:         Mood and Affect: Mood normal.         Behavior: Behavior normal.             REVIEWED DATA     Labs:     Lab Results   Component Value Date     (H) 12/17/2024    K 4.4 12/17/2024    CALCIUM 9.9 12/17/2024    AST 39 11/20/2024    ALT 58 (H) 11/20/2024    BUN 20 12/17/2024    CREATININE 1.23 12/17/2024    CREATININE 1.48 (H) 11/20/2024    CREATININE 1.32 (H) 08/27/2024    EGFRRESULT 60 12/17/2024     Lab Results   Component Value Date    HGBA1C 6.20 (H) 11/20/2024    HGBA1C 6.30 (H) 06/19/2024    HGBA1C 6.40 (H) 11/03/2023     Lab Results   Component Value Date    LDL 91 08/27/2024     (H) 06/19/2024     (H) 11/03/2023    HDL 32 (L) 08/27/2024    HDL 32 (L) 06/19/2024    TRIG 320 (H) 08/27/2024    TRIG 500 (H) 06/19/2024     Lab Results   Component Value Date    TSH 3.780 09/10/2020    TSH 3.230 04/17/2019    TSH 4.270 (H) 02/08/2018    FREET4 1.41 09/10/2020    FREET4 1.17 04/17/2019    FREET4 1.18  02/08/2018     Lab Results   Component Value Date    WBC 8.62 06/09/2020    HGB 15.9 06/09/2020     06/09/2020     Lab Results   Component Value Date    MALBCRERATIO 66 (H) 11/03/2023             Imaging:               Medical Tests:               Summary of old records / correspondence / consultant report:             Request outside records:

## 2025-01-29 DIAGNOSIS — I10 ESSENTIAL HYPERTENSION: Chronic | ICD-10-CM

## 2025-01-30 RX ORDER — AMLODIPINE BESYLATE 10 MG/1
10 TABLET ORAL DAILY
Qty: 90 TABLET | Refills: 0 | Status: SHIPPED | OUTPATIENT
Start: 2025-01-30

## 2025-02-12 ENCOUNTER — OFFICE VISIT (OUTPATIENT)
Dept: INTERNAL MEDICINE | Age: 80
End: 2025-02-12
Payer: MEDICARE

## 2025-02-12 ENCOUNTER — TELEPHONE (OUTPATIENT)
Dept: INTERNAL MEDICINE | Age: 80
End: 2025-02-12

## 2025-02-12 VITALS
HEIGHT: 64 IN | WEIGHT: 200 LBS | BODY MASS INDEX: 34.15 KG/M2 | DIASTOLIC BLOOD PRESSURE: 88 MMHG | TEMPERATURE: 96.9 F | OXYGEN SATURATION: 95 % | HEART RATE: 76 BPM | SYSTOLIC BLOOD PRESSURE: 146 MMHG

## 2025-02-12 DIAGNOSIS — R80.9 TYPE 2 DIABETES MELLITUS WITH DIABETIC MICROALBUMINURIA, WITHOUT LONG-TERM CURRENT USE OF INSULIN: Primary | Chronic | ICD-10-CM

## 2025-02-12 DIAGNOSIS — E11.29 TYPE 2 DIABETES MELLITUS WITH DIABETIC MICROALBUMINURIA, WITHOUT LONG-TERM CURRENT USE OF INSULIN: Primary | Chronic | ICD-10-CM

## 2025-02-12 DIAGNOSIS — E78.2 MIXED HYPERCHOLESTEROLEMIA AND HYPERTRIGLYCERIDEMIA: Chronic | ICD-10-CM

## 2025-02-12 DIAGNOSIS — I10 PRIMARY HYPERTENSION: Chronic | ICD-10-CM

## 2025-02-12 PROBLEM — E66.811 CLASS 1 OBESITY DUE TO EXCESS CALORIES WITH SERIOUS COMORBIDITY AND BODY MASS INDEX (BMI) OF 34.0 TO 34.9 IN ADULT: Chronic | Status: ACTIVE | Noted: 2017-04-17

## 2025-02-12 PROCEDURE — 99214 OFFICE O/P EST MOD 30 MIN: CPT | Performed by: INTERNAL MEDICINE

## 2025-02-12 PROCEDURE — 3079F DIAST BP 80-89 MM HG: CPT | Performed by: INTERNAL MEDICINE

## 2025-02-12 PROCEDURE — G2211 COMPLEX E/M VISIT ADD ON: HCPCS | Performed by: INTERNAL MEDICINE

## 2025-02-12 PROCEDURE — 3077F SYST BP >= 140 MM HG: CPT | Performed by: INTERNAL MEDICINE

## 2025-02-12 PROCEDURE — 1126F AMNT PAIN NOTED NONE PRSNT: CPT | Performed by: INTERNAL MEDICINE

## 2025-02-12 RX ORDER — OLMESARTAN MEDOXOMIL 40 MG/1
40 TABLET ORAL DAILY
Qty: 90 TABLET | Refills: 1 | Status: SHIPPED | OUTPATIENT
Start: 2025-02-12

## 2025-02-12 NOTE — PROGRESS NOTES
J  U  N  O  H    K  I  M ,   M  D      I  N  T  E  R  N  A  L    M  E  D  I  C  I  N  E         ENCOUNTER DATE:  02/12/2025    Nicolás DONALDSON Wayne / 79 y.o. / male    OFFICE VISIT ENCOUNTER       CHIEF COMPLAINT / REASON FOR OFFICE VISIT     Hypertension, Diabetes,  Mixed hypercholesterolemia and hypertriglyceridemia, and Obesity      ASSESSMENT & PLAN     Problem List Items Addressed This Visit          High    Type 2 diabetes mellitus with diabetic microalbuminuria, without long-term current use of insulin - Primary (Chronic)    Current Assessment & Plan     Discontinue metformin ER due to GI side effects. Taking Farxiga (dapagliflozin) 10 mg only.   Check A1c today.     Look into cheaper alternative to Farxiga (dapagliflozin) or Jardiance (empagliflozin).     Recheck A1c prior to follow-up in 3 months.         Relevant Medications    Farxiga 10 MG tablet    olmesartan (BENICAR) 40 MG tablet    Other Relevant Orders    Hemoglobin A1c    Microalbumin / Creatinine Urine Ratio - Urine, Clean Catch    Hemoglobin A1c    Basic Metabolic Panel       Medium    Hypertension (Chronic)    Current Assessment & Plan     BP Readings from Last 3 Encounters:   02/12/25 146/88   12/26/24 142/76   12/06/24 128/82       Blood pressure is high.  Discontinue benazepril 40 mg and start olmesartan 40 mg daily.  Continue amlodipine 10 mg, metoprolol succinate 50 mg twice daily and doxazosin 2 mg nightly.  Monitor blood pressure at home.         Relevant Medications    metoprolol succinate XL (TOPROL-XL) 50 MG 24 hr tablet    doxazosin (CARDURA) 2 MG tablet    amLODIPine (NORVASC) 10 MG tablet    olmesartan (BENICAR) 40 MG tablet    Other Relevant Orders    Basic Metabolic Panel    Mixed hypercholesterolemia and hypertriglyceridemia (Chronic)    Current Assessment & Plan     Lab Results   Component Value Date    LDL 91 08/27/2024     (H) 06/19/2024     (H) 11/03/2023    TRIG 320 (H) 08/27/2024    CHOLHDLRATIO 5.50  "08/27/2024       Patient reports to having discontinued Vascepa (probably due to cost) and is taking rosuvastatin 20 mg.  Will recheck labs prior to follow-up in 3 months.         Relevant Medications    rosuvastatin (CRESTOR) 20 MG tablet    Other Relevant Orders    Lipid Panel With / Chol / HDL Ratio     Orders Placed This Encounter   Procedures    Hemoglobin A1c     Order Specific Question:   Release to patient     Answer:   Routine Release [[id number]]    Microalbumin / Creatinine Urine Ratio - Urine, Clean Catch     Order Specific Question:   Release to patient     Answer:   Routine Release [[id number]]    Hemoglobin A1c     Standing Status:   Future     Standing Expiration Date:   2/12/2026     Order Specific Question:   Release to patient     Answer:   Routine Release [[id number]]    Basic Metabolic Panel     Standing Status:   Future     Standing Expiration Date:   2/12/2026     Order Specific Question:   Release to patient     Answer:   Routine Release [[id number]]    Lipid Panel With / Chol / HDL Ratio     Standing Status:   Future     Standing Expiration Date:   2/12/2026     Order Specific Question:   Release to patient     Answer:   Routine Release [[id number]]     New Medications Ordered This Visit   Medications    olmesartan (BENICAR) 40 MG tablet     Sig: Take 1 tablet by mouth Daily.     Dispense:  90 tablet     Refill:  1       SUMMARY/DISCUSSION        TOTAL TIME OF ENCOUNTER:      Next Appointment with me: Visit date not found    Return in about 3 months (around 5/12/2025) for Hypertension, Diabetes, FASTING LABS 1 WEEK PRIOR TO FOLLOWUP.      VITAL SIGNS     Vitals:    02/12/25 1406   BP: 146/88   Pulse: 76   Temp: 96.9 °F (36.1 °C)   SpO2: 95%   Weight: 90.7 kg (200 lb)   Height: 162.6 cm (64.02\")       BP Readings from Last 3 Encounters:   02/12/25 146/88   12/26/24 142/76   12/06/24 128/82     Wt Readings from Last 3 Encounters:   02/12/25 90.7 kg (200 lb)   12/26/24 92.2 kg (203 lb 3.2 oz) "   12/06/24 93.4 kg (206 lb)     Body mass index is 34.31 kg/m².    Blood pressure readings recorded on patient flowsheet:       No data to display                MEDICATIONS AT THE TIME OF OFFICE VISIT     Current Outpatient Medications on File Prior to Visit   Medication Sig    acetaminophen (TYLENOL) 650 MG 8 hr tablet Take 1 tablet by mouth Every 8 (Eight) Hours As Needed.    amLODIPine (NORVASC) 10 MG tablet Take 1 tablet by mouth Daily.    doxazosin (CARDURA) 2 MG tablet TAKE ONE TABLET BY MOUTH ONCE NIGHTLY    Farxiga 10 MG tablet Take 10 mg by mouth Every Morning.    metoprolol succinate XL (TOPROL-XL) 50 MG 24 hr tablet TAKE ONE TABLET BY MOUTH TWICE A DAY    multivitamin with minerals (Multivitamin Adults 50+) tablet tablet Take 1 tablet by mouth Daily.    pantoprazole (PROTONIX) 40 MG EC tablet TAKE 1 TABLET BY MOUTH DAILY    rosuvastatin (CRESTOR) 20 MG tablet TAKE ONE TABLET BY MOUTH ONCE NIGHTLY    Selenium 200 MCG capsule Take 200 mcg by mouth Daily.    [DISCONTINUED] benazepril (LOTENSIN) 40 MG tablet TAKE 1 TABLET BY MOUTH DAILY    cyclobenzaprine (FLEXERIL) 10 MG tablet Take 1 tablet by mouth 3 (Three) Times a Day As Needed for Muscle Spasms. (Patient not taking: Reported on 2/12/2025)    ketoconazole (NIZORAL) 2 % shampoo  (Patient not taking: Reported on 2/12/2025)    [DISCONTINUED] icosapent ethyl (Vascepa) 1 g capsule capsule Take 2 g by mouth 2 (Two) Times a Day With Meals. (Patient not taking: Reported on 2/12/2025)    [DISCONTINUED] metFORMIN ER (GLUCOPHAGE-XR) 500 MG 24 hr tablet TAKE ONE TABLET BY MOUTH ONCE DAILY WITH DINNER (Patient not taking: Reported on 2/12/2025)    [DISCONTINUED] traMADol (ULTRAM) 50 MG tablet Take 1 tablet by mouth Every 8 (Eight) Hours As Needed for Moderate Pain. (Patient not taking: Reported on 2/12/2025)     No current facility-administered medications on file prior to visit.         HISTORY OF PRESENT ILLNESS     Patient states that metformin was discontinued  due to diarrhea.  He tried metformin ER with breakfast which did not help.  So currently he is just taking Farxiga 10 mg daily.  He complains of the high cost of medication which exceeds $200 a month.  Most recent A1c in November was 6.2.  Denies significant side effects from Farxiga.  He also reports to discontinuing Vascepa probably due to cost of the medication but remains on rosuvastatin 20 mg.  His blood pressure is high today in spite of being on 4 different blood pressure medications.    Lab Results   Component Value Date    HGBA1C 6.20 (H) 11/20/2024    HGBA1C 6.30 (H) 06/19/2024    HGBA1C 6.40 (H) 11/03/2023    CREATININE 1.23 12/17/2024    LDL 91 08/27/2024    MALBCRERATIO 66 (H) 11/03/2023     Blood sugar readings recorded on patient's flowsheet:       No data to display               90.7 kg (200 lb)    Patient Care Team:  Eusebio Seaman MD as PCP - General (Internal Medicine)  Teodoro Ling MD as Consulting Physician (Orthopedic Surgery)    REVIEW OF SYSTEMS     Constitutional neg except per HPI   Resp neg  CV neg       PHYSICAL EXAMINATION     Physical Exam  Alert with normal thought and judgment.   Obesity       REVIEWED DATA     Labs:       Lab Results   Component Value Date     (H) 12/17/2024    K 4.4 12/17/2024    CALCIUM 9.9 12/17/2024    AST 39 11/20/2024    ALT 58 (H) 11/20/2024    BUN 20 12/17/2024    CREATININE 1.23 12/17/2024    CREATININE 1.48 (H) 11/20/2024    CREATININE 1.32 (H) 08/27/2024    EGFRRESULT 60 12/17/2024     Lab Results   Component Value Date    HGBA1C 6.20 (H) 11/20/2024    HGBA1C 6.30 (H) 06/19/2024    HGBA1C 6.40 (H) 11/03/2023     Lab Results   Component Value Date    LDL 91 08/27/2024     (H) 06/19/2024     (H) 11/03/2023    HDL 32 (L) 08/27/2024    HDL 32 (L) 06/19/2024    TRIG 320 (H) 08/27/2024    TRIG 500 (H) 06/19/2024     Lab Results   Component Value Date    TSH 3.780 09/10/2020    TSH 3.230 04/17/2019    TSH 4.270 (H) 02/08/2018    FREET4  1.41 09/10/2020    FREET4 1.17 04/17/2019    FREET4 1.18 02/08/2018     Lab Results   Component Value Date    WBC 8.62 06/09/2020    HGB 15.9 06/09/2020     06/09/2020     Lab Results   Component Value Date    MALBCRERATIO 66 (H) 11/03/2023         Imaging:           Medical Tests:           Summary of old records / correspondence / consultant report:           Request outside records:

## 2025-02-12 NOTE — ASSESSMENT & PLAN NOTE
Discontinue metformin ER due to GI side effects. Taking Farxiga (dapagliflozin) 10 mg only.   Check A1c today.     Look into cheaper alternative to Farxiga (dapagliflozin) or Jardiance (empagliflozin).     Recheck A1c prior to follow-up in 3 months.

## 2025-02-12 NOTE — ASSESSMENT & PLAN NOTE
Lab Results   Component Value Date    LDL 91 08/27/2024     (H) 06/19/2024     (H) 11/03/2023    TRIG 320 (H) 08/27/2024    CHOLHDLRATIO 5.50 08/27/2024       Patient reports to having discontinued Vascepa (probably due to cost) and is taking rosuvastatin 20 mg.  Will recheck labs prior to follow-up in 3 months.

## 2025-02-12 NOTE — ASSESSMENT & PLAN NOTE
BP Readings from Last 3 Encounters:   02/12/25 146/88   12/26/24 142/76   12/06/24 128/82       Blood pressure is high.  Discontinue benazepril 40 mg and start olmesartan 40 mg daily.  Continue amlodipine 10 mg, metoprolol succinate 50 mg twice daily and doxazosin 2 mg nightly.  Monitor blood pressure at home.

## 2025-02-13 ENCOUNTER — TELEPHONE (OUTPATIENT)
Dept: INTERNAL MEDICINE | Age: 80
End: 2025-02-13
Payer: MEDICARE

## 2025-02-13 LAB
ALBUMIN/CREAT UR: 147 MG/G CREAT (ref 0–29)
CREAT UR-MCNC: 109.4 MG/DL
HBA1C MFR BLD: 5.9 % (ref 4.8–5.6)
MICROALBUMIN UR-MCNC: 160.9 UG/ML

## 2025-02-13 NOTE — TELEPHONE ENCOUNTER
"LVM with patient to schedule lab appointment.     Per Dr. Seaman: \"Please contact patient and schedule fasting lab appointment 1 week prior to follow-up\"  "

## 2025-02-13 NOTE — PROGRESS NOTES
A1c level for blood sugar average is lower.     Urine with protein. Maintain tight blood sugar and blood pressure control.

## 2025-03-02 DIAGNOSIS — E11.29 TYPE 2 DIABETES MELLITUS WITH DIABETIC MICROALBUMINURIA, WITHOUT LONG-TERM CURRENT USE OF INSULIN: Chronic | ICD-10-CM

## 2025-03-02 DIAGNOSIS — R80.9 TYPE 2 DIABETES MELLITUS WITH DIABETIC MICROALBUMINURIA, WITHOUT LONG-TERM CURRENT USE OF INSULIN: Chronic | ICD-10-CM

## 2025-03-03 RX ORDER — DAPAGLIFLOZIN 10 MG/1
1 TABLET, FILM COATED ORAL EVERY MORNING
Qty: 30 TABLET | Refills: 5 | Status: SHIPPED | OUTPATIENT
Start: 2025-03-03

## 2025-03-31 DIAGNOSIS — E78.2 MIXED HYPERCHOLESTEROLEMIA AND HYPERTRIGLYCERIDEMIA: Chronic | ICD-10-CM

## 2025-03-31 RX ORDER — ROSUVASTATIN CALCIUM 20 MG/1
20 TABLET, COATED ORAL NIGHTLY
Qty: 90 TABLET | Refills: 1 | Status: SHIPPED | OUTPATIENT
Start: 2025-03-31

## 2025-05-04 DIAGNOSIS — I10 ESSENTIAL HYPERTENSION: Chronic | ICD-10-CM

## 2025-05-05 RX ORDER — AMLODIPINE BESYLATE 10 MG/1
10 TABLET ORAL DAILY
Qty: 90 TABLET | Refills: 1 | Status: SHIPPED | OUTPATIENT
Start: 2025-05-05

## 2025-05-13 ENCOUNTER — OFFICE VISIT (OUTPATIENT)
Dept: INTERNAL MEDICINE | Age: 80
End: 2025-05-13
Payer: MEDICARE

## 2025-05-13 VITALS
WEIGHT: 206 LBS | DIASTOLIC BLOOD PRESSURE: 102 MMHG | OXYGEN SATURATION: 95 % | TEMPERATURE: 96.9 F | SYSTOLIC BLOOD PRESSURE: 168 MMHG | BODY MASS INDEX: 35.17 KG/M2 | HEIGHT: 64 IN | HEART RATE: 75 BPM

## 2025-05-13 DIAGNOSIS — G47.33 OSA (OBSTRUCTIVE SLEEP APNEA): Chronic | ICD-10-CM

## 2025-05-13 DIAGNOSIS — E78.2 MIXED HYPERCHOLESTEROLEMIA AND HYPERTRIGLYCERIDEMIA: Chronic | ICD-10-CM

## 2025-05-13 DIAGNOSIS — E11.29 TYPE 2 DIABETES MELLITUS WITH DIABETIC MICROALBUMINURIA, WITHOUT LONG-TERM CURRENT USE OF INSULIN: Chronic | ICD-10-CM

## 2025-05-13 DIAGNOSIS — R80.9 TYPE 2 DIABETES MELLITUS WITH DIABETIC MICROALBUMINURIA, WITHOUT LONG-TERM CURRENT USE OF INSULIN: Chronic | ICD-10-CM

## 2025-05-13 DIAGNOSIS — I10 ACCELERATED HYPERTENSION: Primary | ICD-10-CM

## 2025-05-13 RX ORDER — METOPROLOL SUCCINATE 50 MG/1
50 TABLET, EXTENDED RELEASE ORAL 2 TIMES DAILY
Qty: 180 TABLET | Refills: 1 | Status: SHIPPED | OUTPATIENT
Start: 2025-05-13

## 2025-05-13 RX ORDER — AMLODIPINE BESYLATE 10 MG/1
10 TABLET ORAL DAILY
Qty: 90 TABLET | Refills: 1 | Status: SHIPPED | OUTPATIENT
Start: 2025-05-13

## 2025-05-13 RX ORDER — DOXAZOSIN 2 MG/1
2 TABLET ORAL NIGHTLY
Qty: 90 TABLET | Refills: 1 | Status: SHIPPED | OUTPATIENT
Start: 2025-05-13

## 2025-05-13 NOTE — ASSESSMENT & PLAN NOTE
Lab Results   Component Value Date     (H) 05/06/2025    LDL 91 08/27/2024     (H) 06/19/2024     Lab Results   Component Value Date    HDL 34 (L) 05/06/2025    HDL 32 (L) 08/27/2024    HDL 32 (L) 06/19/2024     Lab Results   Component Value Date    TRIG 341 (H) 05/06/2025      LDL cholesterol is significantly higher. Doubt he is taking rosuvastatin 20 mg. Instructed to resume medication.

## 2025-05-13 NOTE — ASSESSMENT & PLAN NOTE
BP Readings from Last 3 Encounters:   05/13/25 (!) 168/102   02/12/25 146/88   12/26/24 142/76      Poor compliance with medications / uncontrolled SARAH.   Resume all blood pressure medications today (was not taking amlodipine, metoprolol succinate, ? Doxazosin)  Call with blood pressure readings tomorrow morning.   Follow-up with APRN / PA this Thursday, bring all medications along with home blood pressure machine.   He expressed understanding and agreed to follow above instructions.

## 2025-05-13 NOTE — ASSESSMENT & PLAN NOTE
Lab Results   Component Value Date    HGBA1C 6.20 (H) 05/06/2025    HGBA1C 5.9 (H) 02/12/2025    HGBA1C 6.20 (H) 11/20/2024    CREATININE 1.09 05/06/2025     (H) 05/06/2025    MALBCRERATIO 147 (H) 02/12/2025      A1c is little higher. Okay for now. Continue Farxiga (dapagliflozin) 10 mg.

## 2025-05-13 NOTE — ASSESSMENT & PLAN NOTE
Blood pressure is high. Likely has severe SARAH. Advised to see sleep medicine. Defers for now but will think about it.

## 2025-05-13 NOTE — PROGRESS NOTES
J  U  N  O  H    K  I  M ,   M  D      I  N  T  E  R  N  A  L    M  E  D  I  C  I  N  E         ENCOUNTER DATE:  05/13/2025    Nicolás DONALDSON Wayne / 79 y.o. / male    OFFICE VISIT ENCOUNTER       CHIEF COMPLAINT / REASON FOR OFFICE VISIT     Diabetes and Hypertension      ASSESSMENT & PLAN     Problem List Items Addressed This Visit          High    Accelerated hypertension - Primary (Chronic)    Current Assessment & Plan   BP Readings from Last 3 Encounters:   05/13/25 (!) 168/102   02/12/25 146/88   12/26/24 142/76      Poor compliance with medications / uncontrolled SARAH.   Resume all blood pressure medications today (was not taking amlodipine, metoprolol succinate, ? Doxazosin)  Call with blood pressure readings tomorrow morning.   Follow-up with APRN / PA this Thursday, bring all medications along with home blood pressure machine.   He expressed understanding and agreed to follow above instructions.          Relevant Medications    olmesartan (BENICAR) 40 MG tablet    amLODIPine (NORVASC) 10 MG tablet    metoprolol succinate XL (TOPROL-XL) 50 MG 24 hr tablet    doxazosin (CARDURA) 2 MG tablet    Type 2 diabetes mellitus with diabetic microalbuminuria, without long-term current use of insulin (Chronic)    Current Assessment & Plan   Lab Results   Component Value Date    HGBA1C 6.20 (H) 05/06/2025    HGBA1C 5.9 (H) 02/12/2025    HGBA1C 6.20 (H) 11/20/2024    CREATININE 1.09 05/06/2025     (H) 05/06/2025    MALBCRERATIO 147 (H) 02/12/2025      A1c is little higher. Okay for now. Continue Farxiga (dapagliflozin) 10 mg.          Relevant Medications    olmesartan (BENICAR) 40 MG tablet    Farxiga 10 MG tablet       Medium    Mixed hypercholesterolemia and hypertriglyceridemia (Chronic)    Current Assessment & Plan      Lab Results   Component Value Date     (H) 05/06/2025    LDL 91 08/27/2024     (H) 06/19/2024     Lab Results   Component Value Date    HDL 34 (L) 05/06/2025    HDL 32 (L)  "08/27/2024    HDL 32 (L) 06/19/2024     Lab Results   Component Value Date    TRIG 341 (H) 05/06/2025      LDL cholesterol is significantly higher. Doubt he is taking rosuvastatin 20 mg. Instructed to resume medication.          Relevant Medications    rosuvastatin (CRESTOR) 20 MG tablet    SARAH (obstructive sleep apnea) (Chronic)    Overview   Unable to tolerate CPAP          Current Assessment & Plan   Blood pressure is high. Likely has severe SARAH. Advised to see sleep medicine. Defers for now but will think about it.           No orders of the defined types were placed in this encounter.    New Medications Ordered This Visit   Medications    amLODIPine (NORVASC) 10 MG tablet     Sig: Take 1 tablet by mouth Daily.     Dispense:  90 tablet     Refill:  1    metoprolol succinate XL (TOPROL-XL) 50 MG 24 hr tablet     Sig: Take 1 tablet by mouth 2 (Two) Times a Day.     Dispense:  180 tablet     Refill:  1    doxazosin (CARDURA) 2 MG tablet     Sig: Take 1 tablet by mouth Every Night.     Dispense:  90 tablet     Refill:  1       SUMMARY/DISCUSSION        TOTAL TIME OF ENCOUNTER:      Next Appointment with me: Visit date not found    Return in about 2 days (around 5/15/2025) for Hypertension.      VITAL SIGNS     Vitals:    05/13/25 1431 05/13/25 1438   BP: (!) 171/101 (!) 168/102   Pulse: 75    Temp: 96.9 °F (36.1 °C)    SpO2: 95%    Weight: 93.4 kg (206 lb)    Height: 162.6 cm (64.02\")        BP Readings from Last 3 Encounters:   05/13/25 (!) 168/102   02/12/25 146/88   12/26/24 142/76     Wt Readings from Last 3 Encounters:   05/13/25 93.4 kg (206 lb)   02/12/25 90.7 kg (200 lb)   12/26/24 92.2 kg (203 lb 3.2 oz)     Body mass index is 35.34 kg/m².    Blood pressure readings recorded on patient flowsheet:       No data to display                MEDICATIONS AT THE TIME OF OFFICE VISIT     Current Outpatient Medications on File Prior to Visit   Medication Sig    acetaminophen (TYLENOL) 650 MG 8 hr tablet Take 1 tablet " by mouth Every 8 (Eight) Hours As Needed.    Farxiga 10 MG tablet TAKE 1 TABLET BY MOUTH EVERY MORNING    ketoconazole (NIZORAL) 2 % shampoo     multivitamin with minerals (Multivitamin Adults 50+) tablet tablet Take 1 tablet by mouth Daily.    olmesartan (BENICAR) 40 MG tablet Take 1 tablet by mouth Daily.    pantoprazole (PROTONIX) 40 MG EC tablet TAKE 1 TABLET BY MOUTH DAILY    rosuvastatin (CRESTOR) 20 MG tablet TAKE ONE TABLET BY MOUTH ONCE NIGHTLY    Selenium 200 MCG capsule Take 200 mcg by mouth Daily.    [DISCONTINUED] amLODIPine (NORVASC) 10 MG tablet TAKE 1 TABLET BY MOUTH DAILY (Patient taking differently: Take 1 tablet by mouth Daily. Not taking for 2 weeks, was out of med)    [DISCONTINUED] doxazosin (CARDURA) 2 MG tablet TAKE ONE TABLET BY MOUTH ONCE NIGHTLY    [DISCONTINUED] metoprolol succinate XL (TOPROL-XL) 50 MG 24 hr tablet TAKE ONE TABLET BY MOUTH TWICE A DAY    [DISCONTINUED] cyclobenzaprine (FLEXERIL) 10 MG tablet Take 1 tablet by mouth 3 (Three) Times a Day As Needed for Muscle Spasms. (Patient not taking: Reported on 5/13/2025)     No current facility-administered medications on file prior to visit.         HISTORY OF PRESENT ILLNESS     His blood pressure is noted to be significantly elevated today.  He denies unusual headaches, change in vision or chest pain.  Upon review of his medications he has not been taking amlodipine 10 mg for several weeks and was likely not taking metoprolol succinate 50 mg twice daily.  He is certain of taking olmesartan 40 mg daily and possibly doxazosin 2 mg at night.  He has uncontrolled obstructive sleep apnea.    Reports compliance with Farxiga for diabetes.  A1c is little higher at 6.2.    LDL cholesterol is significantly higher and is doubtful that he has been taking rosuvastatin 20 mg.    Lab Results   Component Value Date    HGBA1C 6.20 (H) 05/06/2025    HGBA1C 5.9 (H) 02/12/2025    HGBA1C 6.20 (H) 11/20/2024    CREATININE 1.09 05/06/2025     (H)  05/06/2025    MALBCRERATIO 147 (H) 02/12/2025     Blood sugar readings recorded on patient's flowsheet:       No data to display               93.4 kg (206 lb)    Patient Care Team:  Eusebio Seaman MD as PCP - General (Internal Medicine)  Teodoro Ling MD as Consulting Physician (Orthopedic Surgery)    REVIEW OF SYSTEMS           PHYSICAL EXAMINATION     Physical Exam  Alert with normal thought and judgment.   Slowed cognition.   Obese   Cardiovascular: Normal rate, regular rhythm.   Pulm/Chest: Effort normal, breath sounds normal.   No lower extremity edema       REVIEWED DATA     Labs:       Lab Results   Component Value Date     05/06/2025    K 4.2 05/06/2025    CALCIUM 9.8 05/06/2025    AST 39 11/20/2024    ALT 58 (H) 11/20/2024    BUN 17 05/06/2025    CREATININE 1.09 05/06/2025    CREATININE 1.23 12/17/2024    CREATININE 1.48 (H) 11/20/2024    EGFR 69.9 05/06/2025     Lab Results   Component Value Date    HGBA1C 6.20 (H) 05/06/2025    HGBA1C 5.9 (H) 02/12/2025    HGBA1C 6.20 (H) 11/20/2024     Lab Results   Component Value Date    MALBCRERATIO 147 (H) 02/12/2025     Lab Results   Component Value Date     (H) 05/06/2025    LDL 91 08/27/2024     (H) 06/19/2024    HDL 34 (L) 05/06/2025    HDL 32 (L) 08/27/2024    TRIG 341 (H) 05/06/2025    TRIG 320 (H) 08/27/2024     Lab Results   Component Value Date    TSH 3.780 09/10/2020    TSH 3.230 04/17/2019    TSH 4.270 (H) 02/08/2018    FREET4 1.41 09/10/2020    FREET4 1.17 04/17/2019    FREET4 1.18 02/08/2018     Lab Results   Component Value Date    WBC 8.62 06/09/2020    HGB 15.9 06/09/2020     06/09/2020         Imaging:           Medical Tests:           Summary of old records / correspondence / consultant report:           Request outside records:

## 2025-05-13 NOTE — PATIENT INSTRUCTIONS
** IMPORTANT MESSAGE FROM DR. CONKLIN **    In our office, your satisfaction is VERY important to us.     You may receive a survey from [facility] by mail or E-mail for you to provide feedback about your visit. This information is invaluable for me to know what we can do to improve our services.     I ask that you please take a few minutes to complete the survey and let us know how we are doing in serving your needs. (You may receive the survey more than once for multiple visits)    Thank You !    Dr. Conklin    _________________________________________________________________________________________________________________________      ** ADDITIONAL INSTRUCTION / REMINDERS FROM DR. CONKLIN **

## 2025-05-14 ENCOUNTER — TELEPHONE (OUTPATIENT)
Dept: INTERNAL MEDICINE | Age: 80
End: 2025-05-14
Payer: MEDICARE

## 2025-05-14 DIAGNOSIS — I10 ACCELERATED HYPERTENSION: Primary | ICD-10-CM

## 2025-05-14 RX ORDER — CLONIDINE HYDROCHLORIDE 0.1 MG/1
0.1 TABLET ORAL 2 TIMES DAILY PRN
Qty: 20 TABLET | Refills: 0 | Status: SHIPPED | OUTPATIENT
Start: 2025-05-14

## 2025-05-14 NOTE — TELEPHONE ENCOUNTER
Spoke to patient and he concerned about his BP, reading last night was 160/95 and elevated this morning to 180/100, patient stated that he started taking his bp medication.

## 2025-05-15 ENCOUNTER — OFFICE VISIT (OUTPATIENT)
Dept: INTERNAL MEDICINE | Age: 80
End: 2025-05-15
Payer: MEDICARE

## 2025-05-15 VITALS
TEMPERATURE: 97.8 F | BODY MASS INDEX: 34.31 KG/M2 | SYSTOLIC BLOOD PRESSURE: 156 MMHG | HEIGHT: 64 IN | RESPIRATION RATE: 16 BRPM | HEART RATE: 67 BPM | WEIGHT: 201 LBS | DIASTOLIC BLOOD PRESSURE: 73 MMHG | OXYGEN SATURATION: 99 %

## 2025-05-15 DIAGNOSIS — I10 ACCELERATED HYPERTENSION: Primary | Chronic | ICD-10-CM

## 2025-05-15 NOTE — PROGRESS NOTES
GRICELDA TORRES PA-C                  I  N  T  E  R  N  A  L    M  E  D  I  C  I  N  E         ENCOUNTER DATE:  05/15/2025    Nicolás DONALDSON Wayne / 79 y.o. / male    OFFICE VISIT ENCOUNTER       CHIEF COMPLAINT / REASON FOR OFFICE VISIT     Hypertension      ASSESSMENT & PLAN     Problem List Items Addressed This Visit       Accelerated hypertension - Primary (Chronic)    Current Assessment & Plan     BP Readings from Last 3 Encounters:   05/15/25 156/73   05/13/25 (!) 168/102   02/12/25 146/88     Medication compliance reviewed / uncontrolled SARAH.   Taking Olmesartan 40 mg daily, Metoprolol ER 50 mg daily, Amlodipine 10 mg daily, and Clonidine 0.1 mg BID for Systolic pressures above 160 mm/hg.   He has only taking a singular dosing of Clonidine so far on this AM.   Blood pressure machine hygiene reviewed in detail. Patient owns a validated Omron ([id number]) blood pressure cuff.   Serial readings with BP cuff (following instruction): 170/93, 168/80, and 154/85 with 3-to-5 minutes between readings.   Return in two weeks for BP review.         Relevant Medications    olmesartan (BENICAR) 40 MG tablet    amLODIPine (NORVASC) 10 MG tablet    metoprolol succinate XL (TOPROL-XL) 50 MG 24 hr tablet    doxazosin (CARDURA) 2 MG tablet    cloNIDine (CATAPRES) 0.1 MG tablet     No orders of the defined types were placed in this encounter.    No orders of the defined types were placed in this encounter.      SUMMARY/DISCUSSION  BP machine & Medication hygienes reviewed.  Corrections made to home BP cuff positioning.   Continue current medications. Return in 2 weeks.         Next Appointment:     Return in about 2 weeks (around 5/29/2025) for Blood Pressure recheck.      VITAL SIGNS     Vitals:    05/15/25 1453 05/15/25 1506   BP: 134/82 156/73   BP Location: Left arm Left arm   Patient Position: Sitting Sitting   Cuff Size: Adult Adult   Pulse: 84 67   Resp: 16    Temp: 97.8 °F (36.6 °C)    SpO2: 99%   "  Weight: 91.2 kg (201 lb)    Height: 162.6 cm (64.02\")        BP Readings from Last 3 Encounters:   05/15/25 156/73   05/13/25 (!) 168/102   02/12/25 146/88     Wt Readings from Last 3 Encounters:   05/15/25 91.2 kg (201 lb)   05/13/25 93.4 kg (206 lb)   02/12/25 90.7 kg (200 lb)     Body mass index is 34.48 kg/m².         No data to display                   MEDICATIONS AT THE TIME OF OFFICE VISIT     Current Outpatient Medications on File Prior to Visit   Medication Sig    acetaminophen (TYLENOL) 650 MG 8 hr tablet Take 1 tablet by mouth Every 8 (Eight) Hours As Needed.    amLODIPine (NORVASC) 10 MG tablet Take 1 tablet by mouth Daily.    cloNIDine (CATAPRES) 0.1 MG tablet Take 1 tablet by mouth 2 (Two) Times a Day As Needed for High Blood Pressure (for systolic BP > 160).    doxazosin (CARDURA) 2 MG tablet Take 1 tablet by mouth Every Night.    Farxiga 10 MG tablet TAKE 1 TABLET BY MOUTH EVERY MORNING    ketoconazole (NIZORAL) 2 % shampoo     metoprolol succinate XL (TOPROL-XL) 50 MG 24 hr tablet Take 1 tablet by mouth 2 (Two) Times a Day.    multivitamin with minerals (Multivitamin Adults 50+) tablet tablet Take 1 tablet by mouth Daily.    olmesartan (BENICAR) 40 MG tablet Take 1 tablet by mouth Daily.    pantoprazole (PROTONIX) 40 MG EC tablet TAKE 1 TABLET BY MOUTH DAILY    rosuvastatin (CRESTOR) 20 MG tablet TAKE ONE TABLET BY MOUTH ONCE NIGHTLY    Selenium 200 MCG capsule Take 200 mcg by mouth Daily.     No current facility-administered medications on file prior to visit.          HISTORY OF PRESENT ILLNESS     Presents for a 48 hours in-office blood pressure follow up and BP machine hygiene appointment. Patient presents with his validated Omron [id number] blood pressure cuff. Position of BP cuff is reviewed and corrected. Using approved home BP cuff with guidance in office patient BP demonstrated 170/93, 168/80, and 154/85 in repeat serial readings. He also presents with prescribed BP medications and reports " taking Olmesartan 40 mg daily, Metoprolol ER 50 mg daily, Amlodipine 10 mg daily, and Clonidine 0.1 mg BID for Systolic pressures above 160 mm/hg. He has just picked up the clonidine on yesterday and taken once so far on today.     Patient Care Team:  Eusebio Seaman MD as PCP - General (Internal Medicine)  Teodoro Ling MD as Consulting Physician (Orthopedic Surgery)    REVIEW OF SYSTEMS     Review of Systems   As Per HPI.  All other systems negative.     PHYSICAL EXAMINATION     Physical Exam  Vitals and nursing note reviewed.   Constitutional:       General: He is not in acute distress.     Appearance: Normal appearance. He is not ill-appearing.   Cardiovascular:      Rate and Rhythm: Normal rate and regular rhythm.      Pulses: Normal pulses.      Heart sounds: Normal heart sounds. No murmur heard.     No friction rub. No gallop.   Pulmonary:      Effort: Pulmonary effort is normal. No respiratory distress.      Breath sounds: Normal breath sounds. No stridor. No wheezing.   Neurological:      Mental Status: He is alert.   Psychiatric:         Mood and Affect: Mood normal.         Behavior: Behavior normal.             REVIEWED DATA     Labs:     Lab Results   Component Value Date     05/06/2025    K 4.2 05/06/2025    CALCIUM 9.8 05/06/2025    AST 39 11/20/2024    ALT 58 (H) 11/20/2024    BUN 17 05/06/2025    CREATININE 1.09 05/06/2025    CREATININE 1.23 12/17/2024    CREATININE 1.48 (H) 11/20/2024     Lab Results   Component Value Date    HGBA1C 6.20 (H) 05/06/2025    HGBA1C 5.9 (H) 02/12/2025    HGBA1C 6.20 (H) 11/20/2024     Lab Results   Component Value Date     (H) 05/06/2025    LDL 91 08/27/2024     (H) 06/19/2024    HDL 34 (L) 05/06/2025    HDL 32 (L) 08/27/2024    TRIG 341 (H) 05/06/2025    TRIG 320 (H) 08/27/2024     Lab Results   Component Value Date    TSH 3.780 09/10/2020    TSH 3.230 04/17/2019    TSH 4.270 (H) 02/08/2018    FREET4 1.41 09/10/2020    FREET4 1.17 04/17/2019     FREET4 1.18 02/08/2018     Lab Results   Component Value Date    WBC 8.62 06/09/2020    HGB 15.9 06/09/2020     06/09/2020     Lab Results   Component Value Date    MALBCRERATIO 147 (H) 02/12/2025             Imaging:               Medical Tests:               Summary of old records / correspondence / consultant report:             Request outside records:

## 2025-05-16 NOTE — ASSESSMENT & PLAN NOTE
BP Readings from Last 3 Encounters:   05/15/25 156/73   05/13/25 (!) 168/102   02/12/25 146/88     Medication compliance reviewed / uncontrolled SARAH.   Taking Olmesartan 40 mg daily, Metoprolol ER 50 mg daily, Amlodipine 10 mg daily, and Clonidine 0.1 mg BID for Systolic pressures above 160 mm/hg.   He has only taking a singular dosing of Clonidine so far on this AM.   Blood pressure machine hygiene reviewed in detail. Patient owns a validated Omron ([id number]) blood pressure cuff.   Serial readings with BP cuff (following instruction): 170/93, 168/80, and 154/85 with 3-to-5 minutes between readings.   Return in two weeks for BP review.

## 2025-05-20 DIAGNOSIS — I10 ACCELERATED HYPERTENSION: ICD-10-CM

## 2025-05-21 RX ORDER — CLONIDINE HYDROCHLORIDE 0.1 MG/1
TABLET ORAL
Qty: 20 TABLET | Refills: 0 | Status: SHIPPED | OUTPATIENT
Start: 2025-05-21

## 2025-05-29 ENCOUNTER — OFFICE VISIT (OUTPATIENT)
Dept: INTERNAL MEDICINE | Age: 80
End: 2025-05-29
Payer: MEDICARE

## 2025-05-29 VITALS
WEIGHT: 197 LBS | HEART RATE: 68 BPM | HEIGHT: 64 IN | DIASTOLIC BLOOD PRESSURE: 82 MMHG | TEMPERATURE: 96.5 F | SYSTOLIC BLOOD PRESSURE: 128 MMHG | OXYGEN SATURATION: 95 % | RESPIRATION RATE: 18 BRPM | BODY MASS INDEX: 33.63 KG/M2

## 2025-05-29 DIAGNOSIS — I10 ACCELERATED HYPERTENSION: Primary | Chronic | ICD-10-CM

## 2025-05-29 NOTE — ASSESSMENT & PLAN NOTE
BP Readings from Last 3 Encounters:   05/29/25 128/82   05/15/25 156/73   05/13/25 (!) 168/102     Medication compliance reviewed / uncontrolled SARAH.   Continue Olmesartan 40 mg daily, Metoprolol ER 50 mg daily, Amlodipine 10 mg daily, and Clonidine 0.1 mg BID for Systolic pressures above 160 mm/hg.   He has not needed any Clonidine dosing for 2 days.

## 2025-05-29 NOTE — PROGRESS NOTES
GRICELDA TORRES PA-C                  I  N  T  E  R  N  A  L    M  E  D  I  C  I  N  E         ENCOUNTER DATE:  05/29/2025    Nicolás DONALDSON Wayne / 79 y.o. / male    OFFICE VISIT ENCOUNTER       CHIEF COMPLAINT / REASON FOR OFFICE VISIT     Hypertension (Starting yesterday his bp medication finally kick in 134/80s, 143/80s,122/80s/Before yesterday it was going up. Pt is checking his bp machine; he took the provider advise about the arm and bp. )      ASSESSMENT & PLAN     Problem List Items Addressed This Visit       Accelerated hypertension - Primary (Chronic)    Current Assessment & Plan     BP Readings from Last 3 Encounters:   05/29/25 128/82   05/15/25 156/73   05/13/25 (!) 168/102     Medication compliance reviewed / uncontrolled SARAH.   Continue Olmesartan 40 mg daily, Metoprolol ER 50 mg daily, Amlodipine 10 mg daily, and Clonidine 0.1 mg BID for Systolic pressures above 160 mm/hg.   He has not needed any Clonidine dosing for 2 days.            Relevant Medications    olmesartan (BENICAR) 40 MG tablet    amLODIPine (NORVASC) 10 MG tablet    metoprolol succinate XL (TOPROL-XL) 50 MG 24 hr tablet    doxazosin (CARDURA) 2 MG tablet    cloNIDine (CATAPRES) 0.1 MG tablet     No orders of the defined types were placed in this encounter.    No orders of the defined types were placed in this encounter.      SUMMARY/DISCUSSION  Blood pressure much better controlled beginning on yesterday, since improving medication compliance on 2 weeks ago.   Patient states systolic BP readings were consistently in 160s until yesterday when systolic readings were 134, 143, and 122. Diastolic readings were mid-80s and less.  Not currently needing to use Clonidine due to improved BP control.         Next Appointment:     Return in about 3 months (around 8/29/2025) for Follow-up with Dr. Seaman for Hypertension and Diabetes. .      VITAL SIGNS     Vitals:    05/29/25 1459   BP: 128/82   BP Location: Left arm  "  Patient Position: Sitting   Cuff Size: Adult   Pulse: 68   Resp: 18   Temp: 96.5 °F (35.8 °C)   TempSrc: Temporal   SpO2: 95%   Weight: 89.4 kg (197 lb)   Height: 162.6 cm (64.02\")       BP Readings from Last 3 Encounters:   05/29/25 128/82   05/15/25 156/73   05/13/25 (!) 168/102     Wt Readings from Last 3 Encounters:   05/29/25 89.4 kg (197 lb)   05/15/25 91.2 kg (201 lb)   05/13/25 93.4 kg (206 lb)     Body mass index is 33.8 kg/m².         No data to display                   MEDICATIONS AT THE TIME OF OFFICE VISIT     Current Outpatient Medications on File Prior to Visit   Medication Sig    acetaminophen (TYLENOL) 650 MG 8 hr tablet Take 1 tablet by mouth Every 8 (Eight) Hours As Needed.    amLODIPine (NORVASC) 10 MG tablet Take 1 tablet by mouth Daily.    cloNIDine (CATAPRES) 0.1 MG tablet TAKE 1 TABLET BY MOUTH 2 TIMES A DAY FOR BLOOD PRESSURE (FOR SYSTOLIC BLOOD PRESSURE > 160)    doxazosin (CARDURA) 2 MG tablet Take 1 tablet by mouth Every Night.    Farxiga 10 MG tablet TAKE 1 TABLET BY MOUTH EVERY MORNING    metoprolol succinate XL (TOPROL-XL) 50 MG 24 hr tablet Take 1 tablet by mouth 2 (Two) Times a Day.    multivitamin with minerals (Multivitamin Adults 50+) tablet tablet Take 1 tablet by mouth Daily.    olmesartan (BENICAR) 40 MG tablet Take 1 tablet by mouth Daily.    pantoprazole (PROTONIX) 40 MG EC tablet TAKE 1 TABLET BY MOUTH DAILY    rosuvastatin (CRESTOR) 20 MG tablet TAKE ONE TABLET BY MOUTH ONCE NIGHTLY    Selenium 200 MCG capsule Take 200 mcg by mouth Daily.    [DISCONTINUED] ketoconazole (NIZORAL) 2 % shampoo  (Patient not taking: Reported on 5/29/2025)     No current facility-administered medications on file prior to visit.          HISTORY OF PRESENT ILLNESS     Hypertension: He is taking Olmesartan 40 mg daily, Metoprolol ER 50 mg daily, Amlodipine 10 mg daily, and Clonidine 0.1 mg BID for Systolic pressures above 160 mm/hg all as prescribed with no adverse effects. He states that he " has not had to utilize his Clonidine since he has become more compliant with BP medications.  Patient states systolic BP readings were consistently in 160s until yesterday when systolic readings were 134, 143, and 122. Diastolic readings were mid-80s and less. BP controlled at 128/82 in office on today.     Patient Care Team:  Eusebio Seaman MD as PCP - General (Internal Medicine)  Teodoro Ling MD as Consulting Physician (Orthopedic Surgery)    REVIEW OF SYSTEMS     Review of Systems   As Per HPI.  All other systems negative.     PHYSICAL EXAMINATION     Physical Exam  Vitals and nursing note reviewed.   Constitutional:       General: He is not in acute distress.     Appearance: Normal appearance. He is obese. He is not ill-appearing.   Cardiovascular:      Rate and Rhythm: Normal rate and regular rhythm.      Pulses: Normal pulses.      Heart sounds: Normal heart sounds. No murmur heard.     No friction rub. No gallop.   Pulmonary:      Effort: Pulmonary effort is normal. No respiratory distress.      Breath sounds: Normal breath sounds. No stridor. No wheezing.   Neurological:      Mental Status: He is alert.   Psychiatric:         Mood and Affect: Mood normal.         Behavior: Behavior normal.             REVIEWED DATA     Labs:     Lab Results   Component Value Date     05/06/2025    K 4.2 05/06/2025    CALCIUM 9.8 05/06/2025    AST 39 11/20/2024    ALT 58 (H) 11/20/2024    BUN 17 05/06/2025    CREATININE 1.09 05/06/2025    CREATININE 1.23 12/17/2024    CREATININE 1.48 (H) 11/20/2024     Lab Results   Component Value Date    HGBA1C 6.20 (H) 05/06/2025    HGBA1C 5.9 (H) 02/12/2025    HGBA1C 6.20 (H) 11/20/2024     Lab Results   Component Value Date     (H) 05/06/2025    LDL 91 08/27/2024     (H) 06/19/2024    HDL 34 (L) 05/06/2025    HDL 32 (L) 08/27/2024    TRIG 341 (H) 05/06/2025    TRIG 320 (H) 08/27/2024     Lab Results   Component Value Date    TSH 3.780 09/10/2020    TSH 3.230  04/17/2019    TSH 4.270 (H) 02/08/2018    FREET4 1.41 09/10/2020    FREET4 1.17 04/17/2019    FREET4 1.18 02/08/2018     Lab Results   Component Value Date    WBC 8.62 06/09/2020    HGB 15.9 06/09/2020     06/09/2020     Lab Results   Component Value Date    MALBCRERATIO 147 (H) 02/12/2025             Imaging:               Medical Tests:               Summary of old records / correspondence / consultant report:             Request outside records:

## 2025-06-02 DIAGNOSIS — I10 ACCELERATED HYPERTENSION: ICD-10-CM

## 2025-06-03 RX ORDER — PANTOPRAZOLE SODIUM 40 MG/1
40 TABLET, DELAYED RELEASE ORAL DAILY
Qty: 90 TABLET | Refills: 1 | Status: SHIPPED | OUTPATIENT
Start: 2025-06-03

## 2025-06-03 RX ORDER — CLONIDINE HYDROCHLORIDE 0.1 MG/1
TABLET ORAL
Qty: 20 TABLET | Refills: 0 | Status: SHIPPED | OUTPATIENT
Start: 2025-06-03

## 2025-06-20 DIAGNOSIS — I10 ACCELERATED HYPERTENSION: ICD-10-CM

## 2025-06-20 RX ORDER — CLONIDINE HYDROCHLORIDE 0.1 MG/1
TABLET ORAL
Qty: 20 TABLET | Refills: 0 | Status: SHIPPED | OUTPATIENT
Start: 2025-06-20

## 2025-07-07 DIAGNOSIS — I10 ACCELERATED HYPERTENSION: ICD-10-CM

## 2025-07-08 RX ORDER — CLONIDINE HYDROCHLORIDE 0.1 MG/1
TABLET ORAL
Qty: 20 TABLET | Refills: 0 | Status: SHIPPED | OUTPATIENT
Start: 2025-07-08

## 2025-07-27 DIAGNOSIS — I10 ACCELERATED HYPERTENSION: ICD-10-CM

## 2025-07-28 RX ORDER — CLONIDINE HYDROCHLORIDE 0.1 MG/1
TABLET ORAL
Qty: 20 TABLET | Refills: 0 | Status: SHIPPED | OUTPATIENT
Start: 2025-07-28

## 2025-08-07 DIAGNOSIS — E11.29 TYPE 2 DIABETES MELLITUS WITH DIABETIC MICROALBUMINURIA, WITHOUT LONG-TERM CURRENT USE OF INSULIN: Chronic | ICD-10-CM

## 2025-08-07 DIAGNOSIS — R80.9 TYPE 2 DIABETES MELLITUS WITH DIABETIC MICROALBUMINURIA, WITHOUT LONG-TERM CURRENT USE OF INSULIN: Chronic | ICD-10-CM

## 2025-08-07 DIAGNOSIS — I10 PRIMARY HYPERTENSION: Chronic | ICD-10-CM

## 2025-08-08 RX ORDER — OLMESARTAN MEDOXOMIL 40 MG/1
40 TABLET ORAL DAILY
Qty: 90 TABLET | Refills: 1 | Status: SHIPPED | OUTPATIENT
Start: 2025-08-08

## 2025-08-12 DIAGNOSIS — I10 ACCELERATED HYPERTENSION: ICD-10-CM

## 2025-08-14 RX ORDER — CLONIDINE HYDROCHLORIDE 0.1 MG/1
TABLET ORAL
Qty: 20 TABLET | Refills: 0 | Status: SHIPPED | OUTPATIENT
Start: 2025-08-14

## 2025-08-21 DIAGNOSIS — I10 ACCELERATED HYPERTENSION: ICD-10-CM

## 2025-08-21 RX ORDER — CLONIDINE HYDROCHLORIDE 0.1 MG/1
TABLET ORAL
Qty: 20 TABLET | Refills: 0 | Status: SHIPPED | OUTPATIENT
Start: 2025-08-21

## 2025-08-27 ENCOUNTER — OFFICE VISIT (OUTPATIENT)
Dept: INTERNAL MEDICINE | Age: 80
End: 2025-08-27
Payer: MEDICARE

## 2025-08-27 VITALS
TEMPERATURE: 97.1 F | HEIGHT: 64 IN | HEART RATE: 73 BPM | OXYGEN SATURATION: 97 % | SYSTOLIC BLOOD PRESSURE: 156 MMHG | WEIGHT: 199 LBS | BODY MASS INDEX: 33.97 KG/M2 | DIASTOLIC BLOOD PRESSURE: 70 MMHG

## 2025-08-27 DIAGNOSIS — G47.33 OSA (OBSTRUCTIVE SLEEP APNEA): Chronic | ICD-10-CM

## 2025-08-27 DIAGNOSIS — E11.29 TYPE 2 DIABETES MELLITUS WITH DIABETIC MICROALBUMINURIA, WITHOUT LONG-TERM CURRENT USE OF INSULIN: Chronic | ICD-10-CM

## 2025-08-27 DIAGNOSIS — R80.9 TYPE 2 DIABETES MELLITUS WITH DIABETIC MICROALBUMINURIA, WITHOUT LONG-TERM CURRENT USE OF INSULIN: Chronic | ICD-10-CM

## 2025-08-27 DIAGNOSIS — I10 ACCELERATED HYPERTENSION: Primary | Chronic | ICD-10-CM

## (undated) DEVICE — FRCP BX RADJAW4 NDL 2.8 240CM LG OG BX40

## (undated) DEVICE — Device: Brand: DEFENDO AIR/WATER/SUCTION AND BIOPSY VALVE

## (undated) DEVICE — ARM DRAPE

## (undated) DEVICE — TUBING, SUCTION, 1/4" X 10', STRAIGHT: Brand: MEDLINE

## (undated) DEVICE — BLADELESS OBTURATOR: Brand: WECK VISTA

## (undated) DEVICE — CANNULA SEAL

## (undated) DEVICE — 3M™ STERI-STRIP™ REINFORCED ADHESIVE SKIN CLOSURES, R1547, 1/2 IN X 4 IN (12 MM X 100 MM), 6 STRIPS/ENVELOPE: Brand: 3M™ STERI-STRIP™

## (undated) DEVICE — REDUCER: Brand: ENDOWRIST

## (undated) DEVICE — SUT MNCRYL PLS ANTIB UD 4/0 PS2 18IN

## (undated) DEVICE — BITEBLOCK OMNI BLOC

## (undated) DEVICE — COLUMN DRAPE

## (undated) DEVICE — 3M™ STERI-STRIP™ COMPOUND BENZOIN TINCTURE 40 BAGS/CARTON 4 CARTONS/CASE C1544: Brand: 3M™ STERI-STRIP™

## (undated) DEVICE — CANN NASL CO2 TRULINK W/O2 A/

## (undated) DEVICE — TBG 02 CRUSH RESIST LF CLR 7FT

## (undated) DEVICE — UNDYED BRAIDED (POLYGLACTIN 910), SYNTHETIC ABSORBABLE SUTURE: Brand: COATED VICRYL

## (undated) DEVICE — SOL ANTISTICK CAUTRY ELECTROLUBE LF

## (undated) DEVICE — VIOLET BRAIDED (POLYGLACTIN 910), SYNTHETIC ABSORBABLE SUTURE: Brand: COATED VICRYL

## (undated) DEVICE — LOU GENERAL ROBOT: Brand: MEDLINE INDUSTRIES, INC.

## (undated) DEVICE — GLV SURG BIOGEL LTX PF 8 1/2

## (undated) DEVICE — THE TORRENT IRRIGATION SCOPE CONNECTOR IS USED WITH THE TORRENT IRRIGATION TUBING TO PROVIDE IRRIGATION FLUIDS SUCH AS STERILE WATER DURING GASTROINTESTINAL ENDOSCOPIC PROCEDURES WHEN USED IN CONJUNCTION WITH AN IRRIGATION PUMP (OR ELECTROSURGICAL UNIT).: Brand: TORRENT

## (undated) DEVICE — SEAL

## (undated) DEVICE — LAPAROSCOPIC SMOKE FILTRATION SYSTEM: Brand: PALL LAPAROSHIELD® PLUS LAPAROSCOPIC SMOKE FILTRATION SYSTEM

## (undated) DEVICE — CANNULA,ADULT,SOFT-TOUCH,7TUBE,SC: Brand: MEDLINE

## (undated) DEVICE — BNDG ADHS PLSTC 1X3IN LF

## (undated) DEVICE — TIP COVER ACCESSORY

## (undated) DEVICE — SOL NACL 0.9PCT 1000ML